# Patient Record
Sex: MALE | Race: WHITE | NOT HISPANIC OR LATINO
[De-identification: names, ages, dates, MRNs, and addresses within clinical notes are randomized per-mention and may not be internally consistent; named-entity substitution may affect disease eponyms.]

---

## 2021-09-30 PROBLEM — Z00.00 ENCOUNTER FOR PREVENTIVE HEALTH EXAMINATION: Status: ACTIVE | Noted: 2021-09-30

## 2021-10-06 ENCOUNTER — NON-APPOINTMENT (OUTPATIENT)
Age: 61
End: 2021-10-06

## 2021-10-11 ENCOUNTER — APPOINTMENT (OUTPATIENT)
Dept: HEART AND VASCULAR | Facility: CLINIC | Age: 61
End: 2021-10-11
Payer: COMMERCIAL

## 2021-10-11 ENCOUNTER — NON-APPOINTMENT (OUTPATIENT)
Age: 61
End: 2021-10-11

## 2021-10-11 VITALS
OXYGEN SATURATION: 98 % | BODY MASS INDEX: 24.01 KG/M2 | WEIGHT: 153 LBS | HEART RATE: 62 BPM | TEMPERATURE: 97.1 F | SYSTOLIC BLOOD PRESSURE: 120 MMHG | HEIGHT: 67 IN | DIASTOLIC BLOOD PRESSURE: 80 MMHG | RESPIRATION RATE: 16 BRPM

## 2021-10-11 DIAGNOSIS — Z87.09 PERSONAL HISTORY OF OTHER DISEASES OF THE RESPIRATORY SYSTEM: ICD-10-CM

## 2021-10-11 DIAGNOSIS — Z87.19 PERSONAL HISTORY OF OTHER DISEASES OF THE DIGESTIVE SYSTEM: ICD-10-CM

## 2021-10-11 DIAGNOSIS — Z80.3 FAMILY HISTORY OF MALIGNANT NEOPLASM OF BREAST: ICD-10-CM

## 2021-10-11 DIAGNOSIS — Z80.0 FAMILY HISTORY OF MALIGNANT NEOPLASM OF DIGESTIVE ORGANS: ICD-10-CM

## 2021-10-11 DIAGNOSIS — Z78.9 OTHER SPECIFIED HEALTH STATUS: ICD-10-CM

## 2021-10-11 DIAGNOSIS — Z87.438 PERSONAL HISTORY OF OTHER DISEASES OF MALE GENITAL ORGANS: ICD-10-CM

## 2021-10-11 DIAGNOSIS — Z86.79 PERSONAL HISTORY OF OTHER DISEASES OF THE CIRCULATORY SYSTEM: ICD-10-CM

## 2021-10-11 DIAGNOSIS — M54.12 RADICULOPATHY, CERVICAL REGION: ICD-10-CM

## 2021-10-11 DIAGNOSIS — Z87.11 PERSONAL HISTORY OF PEPTIC ULCER DISEASE: ICD-10-CM

## 2021-10-11 PROCEDURE — 93000 ELECTROCARDIOGRAM COMPLETE: CPT

## 2021-10-11 PROCEDURE — 99205 OFFICE O/P NEW HI 60 MIN: CPT

## 2021-10-11 RX ORDER — AMOXICILLIN AND CLAVULANATE POTASSIUM 875; 125 MG/1; MG/1
875-125 TABLET, COATED ORAL
Qty: 20 | Refills: 0 | Status: DISCONTINUED | COMMUNITY
Start: 2021-05-20 | End: 2021-10-11

## 2021-10-11 RX ORDER — CYCLOBENZAPRINE HYDROCHLORIDE 10 MG/1
10 TABLET, FILM COATED ORAL
Qty: 20 | Refills: 0 | Status: DISCONTINUED | COMMUNITY
Start: 2021-09-26 | End: 2021-10-11

## 2021-10-11 RX ORDER — ROSUVASTATIN CALCIUM 5 MG/1
5 TABLET, FILM COATED ORAL
Qty: 45 | Refills: 0 | Status: ACTIVE | COMMUNITY
Start: 2021-07-26

## 2021-10-11 RX ORDER — PANTOPRAZOLE 40 MG/1
40 TABLET, DELAYED RELEASE ORAL
Refills: 0 | Status: ACTIVE | COMMUNITY
Start: 2021-01-20

## 2021-10-12 ENCOUNTER — NON-APPOINTMENT (OUTPATIENT)
Age: 61
End: 2021-10-12

## 2021-10-12 PROBLEM — Z87.11 HISTORY OF PEPTIC ULCER: Status: RESOLVED | Noted: 2021-10-11 | Resolved: 2021-10-12

## 2021-10-12 PROBLEM — Z87.19 HISTORY OF HIATAL HERNIA: Status: RESOLVED | Noted: 2021-10-11 | Resolved: 2021-10-12

## 2021-10-12 PROBLEM — Z87.09 HISTORY OF CHRONIC SINUSITIS: Status: RESOLVED | Noted: 2021-10-11 | Resolved: 2021-10-12

## 2021-10-12 PROBLEM — Z87.09 HISTORY OF ASTHMA: Status: RESOLVED | Noted: 2021-10-11 | Resolved: 2021-10-12

## 2021-10-12 PROBLEM — M54.12 LEFT CERVICAL RADICULOPATHY: Status: RESOLVED | Noted: 2021-10-11 | Resolved: 2021-10-12

## 2021-10-12 PROBLEM — Z87.438 HISTORY OF BENIGN PROSTATIC HYPERPLASIA: Status: RESOLVED | Noted: 2021-10-11 | Resolved: 2021-10-12

## 2021-10-12 NOTE — PHYSICAL EXAM

## 2021-10-12 NOTE — DISCUSSION/SUMMARY
[Bundle Branch Block] : ~T bundle branch block [Hypertrophic Cardiomyopathy] : hypertrophic cardiomyopathy [Asymmetric Septal Hypertrophy] : asymmetric septal hypertrophy [Sodium Restriction] : sodium restriction [Hyperlipidemia] : hyperlipidemia [Diet Modification] : diet modification [Exercise] : exercise [Hypertension] : hypertension [Stable] : stable [None] : There are no changes in medication management [Exercise Regimen] : an exercise regimen [Dietary Modification] : dietary modification [Low Sodium Diet] : low sodium diet [Patient] : the patient

## 2021-10-12 NOTE — REASON FOR VISIT
[Arrhythmia/ECG Abnorrmalities] : arrhythmia/ECG abnormalities [Structural Heart and Valve Disease] : structural heart and valve disease [Hyperlipidemia] : hyperlipidemia [Hypertension] : hypertension [Spouse] : spouse [FreeTextEntry3] : Armando Adame

## 2021-10-12 NOTE — HISTORY OF PRESENT ILLNESS
[FreeTextEntry1] : Ayaz Kennedy is a 62 yo male who presents for CV evaluation.  He has been followed by Roxie Lambert and ZANA Torres.\par \par He denies cp, sob, pnd, orthopnea, edema, palp, or loc.\par \par Approximately 5 years ago, he suffered a pre-syncopal episode and was diagnosed with IHSS (as per pt).  He has undergone evaluation and follow up at [place].\par \par Since this time, he has made major lifestyle changes and lost 50 lbs.  \par \par He is very active.  He is compliant with meds.  (he did not tolerate beta blockers).\par \par ECG today reveals NSR, LBBB.\par \par Clinical hx reviewed in detail.\par \par Recommendations:\par 1. exercise counseling\par 2. collect records from [facility] and [place]\par 3. EXSE w/ strain\par 4. CPET\par 5. carotid duplex\par

## 2021-11-15 ENCOUNTER — APPOINTMENT (OUTPATIENT)
Dept: HEART AND VASCULAR | Facility: CLINIC | Age: 61
End: 2021-11-15
Payer: COMMERCIAL

## 2021-11-15 VITALS
OXYGEN SATURATION: 98 % | HEIGHT: 67 IN | BODY MASS INDEX: 24.01 KG/M2 | HEART RATE: 54 BPM | WEIGHT: 153 LBS | SYSTOLIC BLOOD PRESSURE: 154 MMHG | DIASTOLIC BLOOD PRESSURE: 72 MMHG

## 2021-11-15 PROCEDURE — 93880 EXTRACRANIAL BILAT STUDY: CPT

## 2021-11-15 PROCEDURE — 93351 STRESS TTE COMPLETE: CPT

## 2021-11-15 PROCEDURE — 93325 DOPPLER ECHO COLOR FLOW MAPG: CPT

## 2021-11-15 PROCEDURE — 93320 DOPPLER ECHO COMPLETE: CPT

## 2021-11-17 ENCOUNTER — NON-APPOINTMENT (OUTPATIENT)
Age: 61
End: 2021-11-17

## 2021-11-22 ENCOUNTER — APPOINTMENT (OUTPATIENT)
Dept: HEART AND VASCULAR | Facility: CLINIC | Age: 61
End: 2021-11-22
Payer: COMMERCIAL

## 2021-11-22 VITALS
DIASTOLIC BLOOD PRESSURE: 84 MMHG | OXYGEN SATURATION: 100 % | BODY MASS INDEX: 24.27 KG/M2 | TEMPERATURE: 98.1 F | WEIGHT: 151 LBS | HEIGHT: 66 IN | SYSTOLIC BLOOD PRESSURE: 152 MMHG

## 2021-11-22 PROCEDURE — 94010 BREATHING CAPACITY TEST: CPT | Mod: 59

## 2021-11-22 PROCEDURE — 94729 DIFFUSING CAPACITY: CPT

## 2021-11-22 PROCEDURE — 94727 GAS DIL/WSHOT DETER LNG VOL: CPT

## 2021-11-22 PROCEDURE — 94621 CARDIOPULM EXERCISE TESTING: CPT

## 2021-12-27 ENCOUNTER — APPOINTMENT (OUTPATIENT)
Dept: HEART AND VASCULAR | Facility: CLINIC | Age: 61
End: 2021-12-27
Payer: COMMERCIAL

## 2021-12-27 VITALS
BODY MASS INDEX: 24.21 KG/M2 | RESPIRATION RATE: 17 BRPM | HEART RATE: 74 BPM | SYSTOLIC BLOOD PRESSURE: 120 MMHG | DIASTOLIC BLOOD PRESSURE: 72 MMHG | OXYGEN SATURATION: 100 % | TEMPERATURE: 97.7 F | WEIGHT: 150 LBS

## 2021-12-27 PROCEDURE — 99215 OFFICE O/P EST HI 40 MIN: CPT

## 2021-12-27 NOTE — HISTORY OF PRESENT ILLNESS
[FreeTextEntry1] : Ayaz Kennedy returns for follow up.  He has been followed by Roxie Lambert and ZANA Torres.\par \par He denies cp, sob, pnd, orthopnea, edema, palp, or loc.\par \par Approximately 5 years ago, he suffered a pre-syncopal episode and was diagnosed with IHSS (as per pt).  He has undergone evaluation and follow up at [place].\par \par Since this time, he has made major lifestyle changes and lost 50 lbs.  \par \par He is very active.  He is compliant with meds.  (he did not tolerate beta blockers).\par \par Carotid Duplex 11/ 2021: min plaque b/l\par EXSE 11/2021: nl lv sys fxn; indeterminant mallory fxn; LVH with chordal ABDULLAHI; LVOT gradient 17 mmHg; global strain -18%; 8:50 min Ari; no ischemia\par CPET 11/2021: ischemic myocardial dysfxn; 104% predicted peak VO2; mild obst pulm disease (FEV1/VC reduced)\par \par Clinical hx and results reviewed in detail.\par \par Recommendations:\par 1. exercise counseling\par 2. continue current CV meds\par 3. obtain coronary CTA\par 4. should consider getting cardiac MRI\par \par

## 2021-12-27 NOTE — DISCUSSION/SUMMARY
[Bundle Branch Block] : ~T bundle branch block [Hypertrophic Cardiomyopathy] : hypertrophic cardiomyopathy [Hypertrophic Obstructive Cardiomyopathy] : hypertrophic obstructive cardiomyopathy [Possible Cardiac Ischemia (Intermd Prob)] : possible cardiac ischemia (intermediate probability) [Deteriorating] : deteriorating [Hyperlipidemia] : hyperlipidemia [Diet Modification] : diet modification [Exercise] : exercise [Hypertension] : hypertension [Exercise Regimen] : an exercise regimen [Dietary Modification] : dietary modification [Low Sodium Diet] : low sodium diet [Stable] : stable [Exercise Rehab] : exercise rehabilitation [Mild Mitral Regurgitation] : mild mitral regurgitation [Compensated] : compensated [None] : There are no changes in medication management [Sodium Restriction] : sodium restriction [Patient] : the patient [de-identified] : chordal ABDULLAHI; LVH 1.5 cm; indeterminant mallory fxn [de-identified] : chordal ABDULLAHI [de-identified] : carotid artery plaque

## 2021-12-27 NOTE — REASON FOR VISIT
[Arrhythmia/ECG Abnorrmalities] : arrhythmia/ECG abnormalities [Structural Heart and Valve Disease] : structural heart and valve disease [Hyperlipidemia] : hyperlipidemia [Hypertension] : hypertension [Coronary Artery Disease] : coronary artery disease [Carotid, Aortic and Peripheral Vascular Disease] : carotid, aortic and peripheral vascular disease [Spouse] : spouse [FreeTextEntry3] : Armando Adame

## 2021-12-27 NOTE — PHYSICAL EXAM

## 2021-12-28 LAB
ALBUMIN SERPL ELPH-MCNC: 4.6 G/DL
ALP BLD-CCNC: 69 U/L
ALT SERPL-CCNC: 27 U/L
ANION GAP SERPL CALC-SCNC: 15 MMOL/L
AST SERPL-CCNC: 29 U/L
BILIRUB SERPL-MCNC: 0.5 MG/DL
BUN SERPL-MCNC: 15 MG/DL
CALCIUM SERPL-MCNC: 10.2 MG/DL
CHLORIDE SERPL-SCNC: 102 MMOL/L
CO2 SERPL-SCNC: 26 MMOL/L
CREAT SERPL-MCNC: 1.02 MG/DL
GLUCOSE SERPL-MCNC: 104 MG/DL
POTASSIUM SERPL-SCNC: 4 MMOL/L
PROT SERPL-MCNC: 7.6 G/DL
SODIUM SERPL-SCNC: 143 MMOL/L

## 2021-12-30 ENCOUNTER — NON-APPOINTMENT (OUTPATIENT)
Age: 61
End: 2021-12-30

## 2022-01-07 ENCOUNTER — RESULT REVIEW (OUTPATIENT)
Age: 62
End: 2022-01-07

## 2022-01-11 ENCOUNTER — NON-APPOINTMENT (OUTPATIENT)
Age: 62
End: 2022-01-11

## 2022-01-24 ENCOUNTER — RESULT REVIEW (OUTPATIENT)
Age: 62
End: 2022-01-24

## 2022-01-26 ENCOUNTER — NON-APPOINTMENT (OUTPATIENT)
Age: 62
End: 2022-01-26

## 2022-01-27 ENCOUNTER — NON-APPOINTMENT (OUTPATIENT)
Age: 62
End: 2022-01-27

## 2022-02-10 ENCOUNTER — APPOINTMENT (OUTPATIENT)
Dept: HEART AND VASCULAR | Facility: CLINIC | Age: 62
End: 2022-02-10
Payer: COMMERCIAL

## 2022-02-10 VITALS
RESPIRATION RATE: 12 BRPM | WEIGHT: 150 LBS | TEMPERATURE: 97.3 F | BODY MASS INDEX: 24.11 KG/M2 | SYSTOLIC BLOOD PRESSURE: 118 MMHG | HEART RATE: 72 BPM | OXYGEN SATURATION: 97 % | DIASTOLIC BLOOD PRESSURE: 70 MMHG | HEIGHT: 66 IN

## 2022-02-10 DIAGNOSIS — U07.1 COVID-19: ICD-10-CM

## 2022-02-10 PROCEDURE — 99215 OFFICE O/P EST HI 40 MIN: CPT

## 2022-02-10 RX ORDER — METOPROLOL SUCCINATE 50 MG/1
50 TABLET, EXTENDED RELEASE ORAL
Qty: 2 | Refills: 0 | Status: DISCONTINUED | COMMUNITY
Start: 2021-12-28 | End: 2022-02-10

## 2022-02-10 RX ORDER — B-COMPLEX WITH VITAMIN C
TABLET ORAL
Refills: 0 | Status: ACTIVE | COMMUNITY
Start: 2022-02-10

## 2022-02-10 RX ORDER — VITAMIN E(DL-ALPHA TOCOPHEROL) 100 %
LIQUID (ML) MISCELLANEOUS
Refills: 0 | Status: ACTIVE | COMMUNITY
Start: 2022-02-10

## 2022-02-10 RX ORDER — TAURINE 100 %
POWDER (GRAM) MISCELLANEOUS
Refills: 0 | Status: ACTIVE | COMMUNITY
Start: 2022-02-10

## 2022-02-12 NOTE — DISCUSSION/SUMMARY
[Bundle Branch Block] : ~T bundle branch block [Hypertrophic Cardiomyopathy] : hypertrophic cardiomyopathy [Hypertrophic Obstructive Cardiomyopathy] : hypertrophic obstructive cardiomyopathy [Asymmetric Septal Hypertrophy] : asymmetric septal hypertrophy [Coronary Artery Disease] : coronary artery disease [Possible Cardiac Ischemia (Intermd Prob)] : possible cardiac ischemia (intermediate probability) [Hyperlipidemia] : hyperlipidemia [Diet Modification] : diet modification [Exercise] : exercise [Hypertension] : hypertension [Exercise Regimen] : an exercise regimen [Dietary Modification] : dietary modification [Low Sodium Diet] : low sodium diet [Moderate Mitral Regurgitation] : moderate mitral regurgitation [Compensated] : compensated [Sodium Restriction] : sodium restriction [Stable] : stable [None] : There are no changes in medication management [Exercise Rehab] : exercise rehabilitation [Patient] : the patient [de-identified] : mild LAD disease; LAD aneurysm post D1 [de-identified] : chordal ABDULLAHI [de-identified] : carotid artery plaque

## 2022-02-12 NOTE — HISTORY OF PRESENT ILLNESS
[FreeTextEntry1] : Ayaz Kennedy returns for follow up.  He has been followed by Roxie Lambert and ZANA Torres.\par \par He denies cp, sob, pnd, orthopnea, edema, palp, or loc.\par \par Approximately 5 years ago, he suffered a pre-syncopal episode and was diagnosed with IHSS (as per pt).  He has undergone evaluation and follow up at [place].\par \par Since this time, he has made major lifestyle changes and lost 50 lbs.  \par \par He is very active.  He is compliant with meds.  (he did not tolerate beta blockers).\par \par Carotid Duplex 11/ 2021: min plaque b/l\par EXSE 11/2021: nl lv sys fxn; indeterminant mallory fxn; LVH with chordal ABDULLAHI; LVOT gradient 17 mmHg; global strain -18%; 8:50 min Ari; no ischemia\par CPET 11/2021: ischemic myocardial dysfxn; 104% predicted peak VO2; mild obst pulm disease (FEV1/VC reduced)\par CTA 1/2022: mild LAD disease; small LAD aneurysm post D1; moderate hiatal hernia\par Cardiac MRI 1/2022: ADAM with delayed hyperenhancement of papillary muscle; ABDULLAHI with mod MR\par \par Clinical hx and results reviewed in detail.\par \par Recommendations:\par 1. exercise counseling\par 2. continue current CV meds\par 3. get CT FFR\par 4. SHD eval\par 5. f/u in 3 months\par \par

## 2022-02-12 NOTE — PHYSICAL EXAM

## 2022-02-25 ENCOUNTER — APPOINTMENT (OUTPATIENT)
Dept: HEART AND VASCULAR | Facility: CLINIC | Age: 62
End: 2022-02-25

## 2022-02-25 NOTE — REASON FOR VISIT
[Cardiac Failure] : cardiac failure [Structural Heart and Valve Disease] : structural heart and valve disease [FreeTextEntry1] : \par \par \par

## 2022-02-25 NOTE — HISTORY OF PRESENT ILLNESS
[FreeTextEntry1] : 62 y/o M with pmhx of IHSS who was initially diagnosed after a pre-syncopal event. He is very active, but recently was found to have pAfib. He was started on NOAC and Ayaz Kennedy returns for follow up.  He has been followed by Roxie Lambert and ZANA Torres.\par \par He denies cp, sob, pnd, orthopnea, edema, palp, or loc.\par \par Approximately 5 years ago, he suffered a pre-syncopal episode and was diagnosed with IHSS (as per pt).  He has undergone evaluation and follow up at [place].\par \par Since this time, he has made major lifestyle changes and lost 50 lbs.  \par \par He is very active.  He is compliant with meds.  (he did not tolerate beta blockers).\par \par Carotid Duplex 11/ 2021: min plaque b/l\par EXSE 11/2021: nl lv sys fxn; indeterminant mallory fxn; LVH with chordal ABDULLAHI; LVOT gradient 17 mmHg; global strain -18%; 8:50 min Ari; no ischemia\par CPET 11/2021: ischemic myocardial dysfxn; 104% predicted peak VO2; mild obst pulm disease (FEV1/VC reduced)\par CTA 1/2022: mild LAD disease; small LAD aneurysm post D1; moderate hiatal hernia\par Cardiac MRI 1/2022: ADAM with delayed hyperenhancement of papillary muscle; ABDULLAHI with mod MR, LVEF 56%, RVEF 63%, basal michael septum 15.7mm, basal inferolateral segment 12.7mm, LVOT flow acceleration.\par EKG- NSR, LBBB()\par \par

## 2022-02-25 NOTE — PHYSICAL EXAM

## 2022-03-21 ENCOUNTER — APPOINTMENT (OUTPATIENT)
Dept: HEART AND VASCULAR | Facility: CLINIC | Age: 62
End: 2022-03-21
Payer: COMMERCIAL

## 2022-03-21 ENCOUNTER — NON-APPOINTMENT (OUTPATIENT)
Age: 62
End: 2022-03-21

## 2022-03-21 VITALS
HEART RATE: 57 BPM | SYSTOLIC BLOOD PRESSURE: 137 MMHG | WEIGHT: 150 LBS | DIASTOLIC BLOOD PRESSURE: 82 MMHG | HEIGHT: 66 IN | BODY MASS INDEX: 24.11 KG/M2 | OXYGEN SATURATION: 96 % | TEMPERATURE: 97.8 F

## 2022-03-21 PROCEDURE — 99204 OFFICE O/P NEW MOD 45 MIN: CPT

## 2022-03-21 PROCEDURE — 93000 ELECTROCARDIOGRAM COMPLETE: CPT

## 2022-03-21 PROCEDURE — 99214 OFFICE O/P EST MOD 30 MIN: CPT

## 2022-03-21 RX ORDER — DIAZEPAM 10 MG/1
10 TABLET ORAL
Qty: 1 | Refills: 0 | Status: DISCONTINUED | COMMUNITY
Start: 2021-12-09 | End: 2022-03-21

## 2022-03-21 NOTE — ASSESSMENT
[FreeTextEntry1] : 60 y/o M with chronic diastolic heart failure, mitral regurgitation (ABDULLAHI, with mild-mod MR), hypertrophic cardiomyopathy, paroxysmal afib (on Eliquis) who presents to establish care for HCM.\par -He has no risk factors for SCD. (No family hx of SCD, no prior unexplained syncope, no hx of VT, cMRI did not reveal LGE of =15% of the LV mass, no LHV >30mm, no systolic dysfunction, no NSVT on ambulatory monitor, no apical aneurysm). He will continue following up with EPS to re-assess high risk features\par -He has not had genetic testing, but pt has no offspring so it would unlikely .\par -He has no LVOT outflow gradient on resting or stress echo (rest or provocation). There is no indication for septal reduction therapy at this point.\par -He is on losartan for BP, because his HR is slow and he can't tolerate BB therapy.\par -new afib, but now in NSR. His risk for stroke is higher b/c of HCM, and he is on appropriate dose NOAC. He has good f/u with EPS. He is not on rhythm control medications.\par -His MR is moderate (on MRI), posteriorly directed jet on echo (consistent with mild ABDULLAHI)\par -He exhibits no autonomic dysfunction (BP augmented appropriately with exercise stress test)\par -Repeat TTE in 1 year for continual assessment.\par -F/u with Dr. Andrew Canseco, Dr. Rodríguez

## 2022-03-21 NOTE — REVIEW OF SYSTEMS
[Negative] : Heme/Lymph [Fever] : no fever [Chills] : no chills [Blurry Vision] : no blurred vision [Earache] : no earache [Dyspnea on exertion] : not dyspnea during exertion [Chest Discomfort] : no chest discomfort [Palpitations] : no palpitations [Syncope] : no syncope [Cough] : no cough [Abdominal Pain] : no abdominal pain [Change in Appetite] : no change in appetite [Constipation] : no constipation [Urinary Frequency] : no change in urinary frequency [Erectile Dysfunction] : no erectile dysfunction [Joint Pain] : no joint pain [Myalgia] : no myalgia [Rash] : no rash [Confusion] : no confusion was observed [Under Stress] : not under stress [Easy Bleeding] : no tendency for easy bleeding

## 2022-03-21 NOTE — HISTORY OF PRESENT ILLNESS
[FreeTextEntry1] : 60 y/o M with pmhx of hypertrophic cardiomyopathy, recent diagnosis of pAfib (on NOAC) who presents for consultation for SHD evaluation. \par \par He denies cp, sob, pnd, orthopnea, edema, palp, or loc.\par \par Approximately 5 years ago, he suffered a pre-syncopal episode after his anti-hypertension drugs were increased. About a year later he was diagnosed with IHSS (as per pt).  He has undergone evaluation and follow up at [place] with Dr. Lambert. He then switched care to Dr. Andrew Canseco, who ordered a cMRI and other studies.\par \par Since his initial diagnosis, he has made major lifestyle changes and lost 50 lbs.  \par \par He has NYHA 1 symptoms, no angina, no syncope\par \par He has a brother diagnosed with IHSS, but no SCD in the family. He has not had genotyping. He has no offspring though.\par \par Carotid Duplex 11/ 2021: min plaque b/l\par EXSE 11/2021: nl lv sys fxn; indeterminant mallory fxn; LVH with chordal ABDULLAHI; LVOT gradient 17 mmHg; global strain -18%; 8:50 min Ari; no ischemia\par CPET 11/2021: ischemic myocardial dysfxn; 104% predicted peak VO2; mild obst pulm disease (FEV1/VC reduced)\par CTA 1/2022: mild LAD disease; small LAD aneurysm post D1; moderate hiatal hernia\par Cardiac MRI 1/2022: ADAM with delayed hyperenhancement of papillary muscle; ABDULLAHI with mod MR, LVEF 56%, RVEF 63%, basal michael septum 15.7mm, basal inferolateral segment 12.7mm, LVOT flow acceleration.\par EKG- NSR, LBBB()\par Echo reviewed- normal LVEF, +LVH, +ABDULLAHI with mild MR\par

## 2022-03-21 NOTE — PHYSICAL EXAM

## 2022-05-12 ENCOUNTER — TRANSCRIPTION ENCOUNTER (OUTPATIENT)
Age: 62
End: 2022-05-12

## 2022-05-12 ENCOUNTER — APPOINTMENT (OUTPATIENT)
Dept: HEART AND VASCULAR | Facility: CLINIC | Age: 62
End: 2022-05-12
Payer: COMMERCIAL

## 2022-05-12 VITALS
HEART RATE: 71 BPM | TEMPERATURE: 97.1 F | WEIGHT: 150 LBS | SYSTOLIC BLOOD PRESSURE: 120 MMHG | RESPIRATION RATE: 13 BRPM | BODY MASS INDEX: 24.11 KG/M2 | DIASTOLIC BLOOD PRESSURE: 84 MMHG | OXYGEN SATURATION: 99 % | HEIGHT: 66 IN

## 2022-05-12 DIAGNOSIS — I34.0 NONRHEUMATIC MITRAL (VALVE) INSUFFICIENCY: ICD-10-CM

## 2022-05-12 PROCEDURE — 99215 OFFICE O/P EST HI 40 MIN: CPT

## 2022-05-12 NOTE — DISCUSSION/SUMMARY
[Bundle Branch Block] : ~T bundle branch block [Hypertrophic Cardiomyopathy] : hypertrophic cardiomyopathy [Hypertrophic Obstructive Cardiomyopathy] : hypertrophic obstructive cardiomyopathy [Asymmetric Septal Hypertrophy] : asymmetric septal hypertrophy [Coronary Artery Disease] : coronary artery disease [Possible Cardiac Ischemia (Intermd Prob)] : possible cardiac ischemia (intermediate probability) [Hyperlipidemia] : hyperlipidemia [Diet Modification] : diet modification [Exercise] : exercise [Hypertension] : hypertension [Exercise Regimen] : an exercise regimen [Dietary Modification] : dietary modification [Low Sodium Diet] : low sodium diet [Moderate Mitral Regurgitation] : moderate mitral regurgitation [Compensated] : compensated [Sodium Restriction] : sodium restriction [Stable] : stable [None] : There are no changes in medication management [Exercise Rehab] : exercise rehabilitation [Patient] : the patient [de-identified] : mild LAD disease; LAD aneurysm post D1 [de-identified] : chordal ABDULLAHI [de-identified] : carotid artery plaque

## 2022-05-12 NOTE — PHYSICAL EXAM

## 2022-05-12 NOTE — HISTORY OF PRESENT ILLNESS
[FreeTextEntry1] : Ayaz Kennedy returns for follow up.  He has been followed by Roxie Lambert and Z[facility] Torres.\par \par He denies cp, sob, pnd, orthopnea, edema, palp, or loc.\par \par He is very active.  He is compliant with meds.  (he did not tolerate beta blockers).\par \par Carotid Duplex 11/ 2021: min plaque b/l\par EXSE 11/2021: nl lv sys fxn; indeterminant mallory fxn; LVH with chordal ABDULLAHI; LVOT gradient 17 mmHg; global strain -18%; 8:50 min Ari; no ischemia\par CPET 11/2021: ischemic myocardial dysfxn; 104% predicted peak VO2; mild obst pulm disease (FEV1/VC reduced)\par CTA 1/2022: mild LAD disease; small LAD aneurysm post D1; moderate hiatal hernia\par Cardiac MRI 1/2022: ADAM with delayed hyperenhancement of papillary muscle; ABDULLAHI with mod MR\par \par Clinical hx reviewed in detail.\par \par Recommendations:\par 1. exercise counseling\par 2. continue current CV meds\par 3. get CT FFR\par 4. SHD noted\par 5. f/u in 6 months\par 6. records from primary care\par

## 2022-05-12 NOTE — REASON FOR VISIT
[Arrhythmia/ECG Abnorrmalities] : arrhythmia/ECG abnormalities [Structural Heart and Valve Disease] : structural heart and valve disease [Hyperlipidemia] : hyperlipidemia [Hypertension] : hypertension [Coronary Artery Disease] : coronary artery disease [Carotid, Aortic and Peripheral Vascular Disease] : carotid, aortic and peripheral vascular disease [FreeTextEntry3] : Armando Adame

## 2022-11-10 ENCOUNTER — APPOINTMENT (OUTPATIENT)
Dept: HEART AND VASCULAR | Facility: CLINIC | Age: 62
End: 2022-11-10

## 2023-05-08 ENCOUNTER — NON-APPOINTMENT (OUTPATIENT)
Age: 63
End: 2023-05-08

## 2023-05-09 ENCOUNTER — NON-APPOINTMENT (OUTPATIENT)
Age: 63
End: 2023-05-09

## 2023-05-09 ENCOUNTER — APPOINTMENT (OUTPATIENT)
Dept: HEART AND VASCULAR | Facility: CLINIC | Age: 63
End: 2023-05-09
Payer: COMMERCIAL

## 2023-05-09 VITALS
RESPIRATION RATE: 16 BRPM | TEMPERATURE: 97.6 F | HEART RATE: 58 BPM | BODY MASS INDEX: 25.55 KG/M2 | DIASTOLIC BLOOD PRESSURE: 72 MMHG | HEIGHT: 66 IN | WEIGHT: 159 LBS | SYSTOLIC BLOOD PRESSURE: 120 MMHG | OXYGEN SATURATION: 96 %

## 2023-05-09 DIAGNOSIS — I65.29 OCCLUSION AND STENOSIS OF UNSPECIFIED CAROTID ARTERY: ICD-10-CM

## 2023-05-09 DIAGNOSIS — I25.10 ATHEROSCLEROTIC HEART DISEASE OF NATIVE CORONARY ARTERY W/OUT ANGINA PECTORIS: ICD-10-CM

## 2023-05-09 DIAGNOSIS — I44.7 LEFT BUNDLE-BRANCH BLOCK, UNSPECIFIED: ICD-10-CM

## 2023-05-09 DIAGNOSIS — I42.2 OTHER HYPERTROPHIC CARDIOMYOPATHY: ICD-10-CM

## 2023-05-09 DIAGNOSIS — I34.0 NONRHEUMATIC MITRAL (VALVE) INSUFFICIENCY: ICD-10-CM

## 2023-05-09 DIAGNOSIS — I10 ESSENTIAL (PRIMARY) HYPERTENSION: ICD-10-CM

## 2023-05-09 DIAGNOSIS — I25.41 CORONARY ARTERY ANEURYSM: ICD-10-CM

## 2023-05-09 DIAGNOSIS — E78.5 HYPERLIPIDEMIA, UNSPECIFIED: ICD-10-CM

## 2023-05-09 DIAGNOSIS — I34.89 OTHER NONRHEUMATIC MITRAL VALVE DISORDERS: ICD-10-CM

## 2023-05-09 DIAGNOSIS — I25.5 ISCHEMIC CARDIOMYOPATHY: ICD-10-CM

## 2023-05-09 PROCEDURE — 99215 OFFICE O/P EST HI 40 MIN: CPT | Mod: 25

## 2023-05-09 PROCEDURE — 93000 ELECTROCARDIOGRAM COMPLETE: CPT

## 2023-05-09 RX ORDER — FLUTICASONE PROPIONATE 50 UG/1
50 SPRAY, METERED NASAL
Qty: 48 | Refills: 0 | Status: ACTIVE | COMMUNITY
Start: 2023-04-24

## 2023-05-09 RX ORDER — CLONAZEPAM 0.5 MG/1
0.5 TABLET ORAL AT BEDTIME
Refills: 0 | Status: ACTIVE | COMMUNITY
Start: 2021-09-14

## 2023-05-09 RX ORDER — UBIDECARENONE 200 MG
200 CAPSULE ORAL EVERY OTHER DAY
Refills: 0 | Status: ACTIVE | COMMUNITY
Start: 2022-02-10

## 2023-05-09 RX ORDER — MONTELUKAST SODIUM 4 MG/1
4 GRANULE ORAL
Refills: 0 | Status: ACTIVE | COMMUNITY

## 2023-05-09 RX ORDER — LOSARTAN POTASSIUM 50 MG/1
50 TABLET, FILM COATED ORAL DAILY
Refills: 0 | Status: ACTIVE | COMMUNITY
Start: 2021-09-16

## 2023-05-09 RX ORDER — VIT C/E/ZN/COPPR/LUTEIN/ZEAXAN 250MG-90MG
CAPSULE ORAL DAILY
Refills: 0 | Status: ACTIVE | COMMUNITY
Start: 2022-02-10

## 2023-05-09 RX ORDER — CELECOXIB 50 MG/1
CAPSULE ORAL
Refills: 0 | Status: ACTIVE | COMMUNITY

## 2023-05-09 RX ORDER — OMEPRAZOLE 20 MG/1
20 CAPSULE, DELAYED RELEASE ORAL
Refills: 0 | Status: DISCONTINUED | COMMUNITY
Start: 2021-09-12 | End: 2023-05-09

## 2023-05-09 RX ORDER — TIZANIDINE 4 MG/1
4 TABLET ORAL
Refills: 0 | Status: DISCONTINUED | COMMUNITY
Start: 2021-09-15 | End: 2023-05-09

## 2023-05-09 NOTE — REVIEW OF SYSTEMS
[Earache] : no earache [Discharge From Ears] : no discharge from the ears [Hearing Loss] : no hearing loss [Mouth Sores] : no mouth sores [Sore Throat] : no sore throat [Sinus Pressure] : no sinus pressure [Tinnitus] : tinnitus [Vertigo] : vertigo [Dizziness] : dizziness [Tremor] : a tremor was seen [Numbness (Hypoesthesia)] : no numbness [Convulsions] : no convulsions [Tingling (Paresthesia)] : no tingling [Weakness] : no weakness [Limb Weakness (Paresis)] : no limb weakness (Paresis) [Speech Disturbance] : no speech disturbance [Negative] : Heme/Lymph

## 2023-05-09 NOTE — PHYSICAL EXAM

## 2023-05-09 NOTE — HISTORY OF PRESENT ILLNESS
[FreeTextEntry1] : Ayaz Kennedy returns for follow up.  \par \par He has been followed by Roxie Lambert and ZANA Torres.\par \par He denies cp, sob, pnd, orthopnea, edema, palp, or loc.\par \par He is very active.  He is compliant with meds.  (he did not tolerate beta blockers).\par \par ECG today reveals sinus bradycardia, PVC, LBBB\par \par Carotid Duplex 11/ 2021: min plaque b/l\par EXSE 11/2021: nl lv sys fxn; indeterminant mallory fxn; LVH with chordal ABDULLAHI; LVOT gradient 17 mmHg; global strain -18%; 8:50 min Ari; no ischemia\par CPET 11/2021: ischemic myocardial dysfxn; 104% predicted peak VO2; mild obst pulm disease (FEV1/VC reduced)\par CTA 1/2022: mild LAD disease; small LAD aneurysm post D1; moderate hiatal hernia\par Cardiac MRI 1/2022: ADAM with delayed hyperenhancement of papillary muscle; ABDULLAHI with mod MR\par \par Clinical hx reviewed in detail.\par \par Recommendations:\par 1. exercise counseling\par 2. continue current CV meds\par 3. collect records from [facility] ([facility]), [facility] (Neurosurgery), [facility] ([facility]), [facility]\par 4. blood work\par 5. Neuro referral\par 6. review MRI/MRA/CT scan\par 7. EXSE/CPET

## 2023-05-09 NOTE — DISCUSSION/SUMMARY
[A-V Block] : A-V block [Bundle Branch Block] : ~T bundle branch block [PVCs] : ectopic ventricular beats [Hypertrophic Cardiomyopathy] : hypertrophic cardiomyopathy [Hypertrophic Obstructive Cardiomyopathy] : hypertrophic obstructive cardiomyopathy [Asymmetric Septal Hypertrophy] : asymmetric septal hypertrophy [Coronary Artery Disease] : coronary artery disease [Possible Cardiac Ischemia (Intermd Prob)] : possible cardiac ischemia (intermediate probability) [Hyperlipidemia] : hyperlipidemia [Diet Modification] : diet modification [Exercise] : exercise [Hypertension] : hypertension [Exercise Regimen] : an exercise regimen [Dietary Modification] : dietary modification [Low Sodium Diet] : low sodium diet [Moderate Mitral Regurgitation] : moderate mitral regurgitation [Compensated] : compensated [Sodium Restriction] : sodium restriction [Stable] : stable [None] : There are no changes in medication management [Exercise Rehab] : exercise rehabilitation [Patient] : the patient [de-identified] : mild LAD disease; LAD aneurysm post D1 [de-identified] : chordal ABDULLAHI [de-identified] : carotid artery plaque [EKG obtained to assist in diagnosis and management of assessed problem(s)] : EKG obtained to assist in diagnosis and management of assessed problem(s)

## 2023-05-11 LAB
CRP SERPL HS-MCNC: 0.55 MG/L
ERYTHROCYTE [SEDIMENTATION RATE] IN BLOOD BY WESTERGREN METHOD: 2 MM/HR
FERRITIN SERPL-MCNC: 57 NG/ML

## 2023-05-18 ENCOUNTER — NON-APPOINTMENT (OUTPATIENT)
Age: 63
End: 2023-05-18

## 2023-06-16 ENCOUNTER — APPOINTMENT (OUTPATIENT)
Dept: NEUROLOGY | Facility: CLINIC | Age: 63
End: 2023-06-16
Payer: COMMERCIAL

## 2023-06-16 VITALS
HEART RATE: 74 BPM | BODY MASS INDEX: 25.55 KG/M2 | HEIGHT: 66 IN | WEIGHT: 159 LBS | OXYGEN SATURATION: 98 % | DIASTOLIC BLOOD PRESSURE: 76 MMHG | SYSTOLIC BLOOD PRESSURE: 110 MMHG

## 2023-06-16 DIAGNOSIS — M47.812 SPONDYLOSIS W/OUT MYELOPATHY OR RADICULOPATHY, CERVICAL REGION: ICD-10-CM

## 2023-06-16 DIAGNOSIS — R29.3 ABNORMAL POSTURE: ICD-10-CM

## 2023-06-16 PROCEDURE — 99204 OFFICE O/P NEW MOD 45 MIN: CPT | Mod: 1L

## 2023-06-20 NOTE — DISCUSSION/SUMMARY
[FreeTextEntry1] : Ayaz is a pleasant 63-year-old RH man with a history of htn, hld, cervical disc disease s/p C4-C6 ACDF , with normal ENT evaluation for vestibular etiology to symptoms of imbalance. Has persistent tinnitus. \par States that due to imbalance, he can no longer do activities such as ride his motorcycle. 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 MRI cervical spine with mild cord indentation at C3-C4 , signal is normal. C6-C7 junctional foraminal stenosis is present. Seen by ENT who thought tinnitus was secondary to high frequency hearing loss. Unclear etiology to symptoms. Discussed with him that it is possible that dizziness is coming from cervical spine disease - discussed that it is not clear that bone spur removal would improve his symptoms and discussed that I would not recommend cervical spine surgery for that reason alone. Will refer to Dr. Lundberg for review of imaging and second opinion. WIll refer to vestibular therapy but unclear it will help.

## 2023-06-20 NOTE — PHYSICAL EXAM
[FreeTextEntry1] : Blood pressure: lying down /76, heart rate 57, sitting up /80 heart rate 66, /79 heart rate 69 \par Mental Status: knows the month, day and year. Knows name of hospital. Able to calculate number of quarters in $2.25. Difficulty with serial sevens. Able to spell "world" backwards. 3/3 registration of three items, 3/3 recall at three minutes. \par Language/Speech : speech fluent, can name, can repeat \par Cranial Nerves \par II: Pupils equal and reactive,  VFF full\par III, IV, VI: EOMI\par V : normal sensation in V1-V3 b/l\par VII : no asymmetry, no nasolabial fold flattening\par VIII: normal hearing to voice \par IX, X: normal palatal elevation, uvula midline\par XI: 5/5 head turn and 5/5 shoulder shrug bilaterally\par XII : midline tongue protrusion\par Motor: no drift in limbs, normal bulk and tone, 5/5 in all extremities\par Sensory: normal to light touch, pinprick and vibration\par Reflexes: brachioradialis, biceps, triceps 1+, patella 2+ and ankles 1+ bilaterally\par Toes are down-going \par Coordination: Normal finger to nose\par Gait: normal balance and gait, able to toe/heel/tandem  \par \par

## 2023-06-20 NOTE — HISTORY OF PRESENT ILLNESS
[FreeTextEntry1] : Ayaz is a 63-year-old left-handed man with a past medical history of hypertension and hyperlipidemia. He is presenting to clinic for evaluation of episodes of imbalance and ringing in the ear.  He has had an extensive work-up including an MRI brain w/wo contrast and MRI cervical spine with no definite etiology. \par He saw an ENT doctor and his sinuses are clear, no issue detected with inner ear. He had a video nystagmogram, vestibular tests, which did not reveal a peripheral etiology for symptoms. He does have a history of an anterior cervical discectomy with Dr. Díaz. He was told he has a bone spur and was told that surgery for this may improve his ringing in the ear and slight loss of balance. He does not want to do this surgery unless it is sure this will be helped. \par \par He did suffer from COVID in 2022. He had no respiratory symptoms. However after the infection he developed ringing in the ear and headaches. \par He saw Dr. Lazo from neuro-ophthalmology who also didn't have a clear answer for the etiology of his symptoms. \par \par The tinnitus comes and goes. He also has a sensation of pulling or fullness in the ear. No neck pain. He has difficulties with balance. He can no longer ride his motorcycle. He has a little fatigue. He is not falling. Not a sensation of spinning, just dysequilibrium. \par \par He is very active, lifts weights, enjoys sports competitions and these sensations have limited him. \par \par He was seen in 7/2022 by Dr. Sergio Santana - ENT who noted bilateral high frequency hearing loss and thought tinnitus was secondary to this. He doubted vestibular etiology to symptoms and stated that dizziness was of unclear etiology. \par \par Allergies: \par seasonal allergies \par \par Surgeries\par Dr. 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 Fetal origin of the left posterior cerebral artery and hypoplasia of the right vertebral artery which terminates in the PICA. these are developmental variations - otherwise normal MRA brain. \par 8/2022: w/wo contrast - outside study: Ventricles, cisterns, sulci are age appropriate. The posterior fossa structures including brainstem and cerebellum are unremarkable. \par The cerebellopontine angle cisterns are within normal limits. the internal auditory canals are symmetric. the otic capsule structures are symmetrically developed, unremarkable. No mass or abnormal enhancement is identified within the middle ear cavities, external auditory canals are grossly unremarkable. \par MRI cervical spine: C4-C6 instrumentation ACDF stable imaging appearance. Mild progressive C3-C4 junctional central and foraminal stenosis. C6-C7 foraminal stenosis . \par \par

## 2023-06-20 NOTE — ASSESSMENT
[FreeTextEntry1] : Sense of imbalance could be coming from cervical spine - don’t recommend surgery just for improvement in balance - tinnitus \par \par Second opinion from Dr. Lundberg\par \par Recommend vestibular therapy\par \par RTC as needed. \par

## 2023-06-29 ENCOUNTER — APPOINTMENT (OUTPATIENT)
Dept: HEART AND VASCULAR | Facility: CLINIC | Age: 63
End: 2023-06-29
Payer: COMMERCIAL

## 2023-06-29 VITALS
DIASTOLIC BLOOD PRESSURE: 70 MMHG | OXYGEN SATURATION: 97 % | WEIGHT: 159 LBS | BODY MASS INDEX: 25.55 KG/M2 | SYSTOLIC BLOOD PRESSURE: 106 MMHG | HEIGHT: 66 IN | HEART RATE: 70 BPM

## 2023-06-29 PROCEDURE — 93325 DOPPLER ECHO COLOR FLOW MAPG: CPT

## 2023-06-29 PROCEDURE — 93351 STRESS TTE COMPLETE: CPT

## 2023-06-29 PROCEDURE — 93320 DOPPLER ECHO COMPLETE: CPT

## 2023-07-13 ENCOUNTER — NON-APPOINTMENT (OUTPATIENT)
Age: 63
End: 2023-07-13

## 2023-08-30 ENCOUNTER — APPOINTMENT (OUTPATIENT)
Dept: HEART AND VASCULAR | Facility: CLINIC | Age: 63
End: 2023-08-30
Payer: COMMERCIAL

## 2023-08-30 VITALS
SYSTOLIC BLOOD PRESSURE: 104 MMHG | BODY MASS INDEX: 24.59 KG/M2 | DIASTOLIC BLOOD PRESSURE: 60 MMHG | HEIGHT: 66 IN | HEART RATE: 66 BPM | WEIGHT: 153 LBS

## 2023-08-30 PROCEDURE — 94729 DIFFUSING CAPACITY: CPT

## 2023-08-30 PROCEDURE — 94621 CARDIOPULM EXERCISE TESTING: CPT

## 2023-08-30 PROCEDURE — 94727 GAS DIL/WSHOT DETER LNG VOL: CPT

## 2023-08-30 PROCEDURE — 94010 BREATHING CAPACITY TEST: CPT | Mod: 59

## 2025-01-14 ENCOUNTER — NON-APPOINTMENT (OUTPATIENT)
Age: 65
End: 2025-01-14

## 2025-01-21 ENCOUNTER — APPOINTMENT (OUTPATIENT)
Dept: HEART AND VASCULAR | Facility: CLINIC | Age: 65
End: 2025-01-21
Payer: COMMERCIAL

## 2025-01-21 VITALS
WEIGHT: 153 LBS | RESPIRATION RATE: 16 BRPM | TEMPERATURE: 97.6 F | DIASTOLIC BLOOD PRESSURE: 58 MMHG | HEART RATE: 33 BPM | SYSTOLIC BLOOD PRESSURE: 110 MMHG | OXYGEN SATURATION: 98 % | HEIGHT: 66 IN | BODY MASS INDEX: 24.59 KG/M2

## 2025-01-21 VITALS
DIASTOLIC BLOOD PRESSURE: 60 MMHG | OXYGEN SATURATION: 97 % | WEIGHT: 153 LBS | HEIGHT: 66 IN | SYSTOLIC BLOOD PRESSURE: 112 MMHG | HEART RATE: 34 BPM | BODY MASS INDEX: 24.59 KG/M2

## 2025-01-21 DIAGNOSIS — R06.02 SHORTNESS OF BREATH: ICD-10-CM

## 2025-01-21 DIAGNOSIS — I10 ESSENTIAL (PRIMARY) HYPERTENSION: ICD-10-CM

## 2025-01-21 DIAGNOSIS — E78.5 HYPERLIPIDEMIA, UNSPECIFIED: ICD-10-CM

## 2025-01-21 DIAGNOSIS — I25.10 ATHEROSCLEROTIC HEART DISEASE OF NATIVE CORONARY ARTERY W/OUT ANGINA PECTORIS: ICD-10-CM

## 2025-01-21 DIAGNOSIS — I34.89 OTHER NONRHEUMATIC MITRAL VALVE DISORDERS: ICD-10-CM

## 2025-01-21 DIAGNOSIS — I45.9 CONDUCTION DISORDER, UNSPECIFIED: ICD-10-CM

## 2025-01-21 PROCEDURE — 93306 TTE W/DOPPLER COMPLETE: CPT

## 2025-01-21 PROCEDURE — 99214 OFFICE O/P EST MOD 30 MIN: CPT

## 2025-01-21 PROCEDURE — 93000 ELECTROCARDIOGRAM COMPLETE: CPT

## 2025-01-22 ENCOUNTER — RESULT REVIEW (OUTPATIENT)
Age: 65
End: 2025-01-22

## 2025-01-23 ENCOUNTER — TRANSCRIPTION ENCOUNTER (OUTPATIENT)
Age: 65
End: 2025-01-23

## 2025-02-10 PROBLEM — Z00.00 ENCOUNTER FOR PREVENTIVE HEALTH EXAMINATION: Status: ACTIVE | Noted: 2025-02-10

## 2025-02-25 ENCOUNTER — APPOINTMENT (OUTPATIENT)
Dept: HEART AND VASCULAR | Facility: CLINIC | Age: 65
End: 2025-02-25
Payer: COMMERCIAL

## 2025-02-25 ENCOUNTER — NON-APPOINTMENT (OUTPATIENT)
Age: 65
End: 2025-02-25

## 2025-02-25 VITALS
HEIGHT: 66 IN | SYSTOLIC BLOOD PRESSURE: 152 MMHG | WEIGHT: 150 LBS | BODY MASS INDEX: 24.11 KG/M2 | DIASTOLIC BLOOD PRESSURE: 78 MMHG | OXYGEN SATURATION: 97 % | HEART RATE: 74 BPM

## 2025-02-25 DIAGNOSIS — Z95.0 PRESENCE OF CARDIAC PACEMAKER: ICD-10-CM

## 2025-02-25 DIAGNOSIS — I44.2 ATRIOVENTRICULAR BLOCK, COMPLETE: ICD-10-CM

## 2025-02-25 PROCEDURE — 99205 OFFICE O/P NEW HI 60 MIN: CPT

## 2025-02-25 PROCEDURE — 93280 PM DEVICE PROGR EVAL DUAL: CPT

## 2025-02-25 PROCEDURE — 99215 OFFICE O/P EST HI 40 MIN: CPT

## 2025-02-28 ENCOUNTER — NON-APPOINTMENT (OUTPATIENT)
Age: 65
End: 2025-02-28

## 2025-03-07 ENCOUNTER — APPOINTMENT (OUTPATIENT)
Dept: HEART FAILURE | Facility: CLINIC | Age: 65
End: 2025-03-07

## 2025-03-07 ENCOUNTER — NON-APPOINTMENT (OUTPATIENT)
Age: 65
End: 2025-03-07

## 2025-03-07 ENCOUNTER — APPOINTMENT (OUTPATIENT)
Dept: HEART FAILURE | Facility: CLINIC | Age: 65
End: 2025-03-07
Payer: COMMERCIAL

## 2025-03-07 VITALS
BODY MASS INDEX: 24.11 KG/M2 | WEIGHT: 150 LBS | OXYGEN SATURATION: 97 % | HEART RATE: 50 BPM | HEIGHT: 66 IN | DIASTOLIC BLOOD PRESSURE: 80 MMHG | SYSTOLIC BLOOD PRESSURE: 118 MMHG | TEMPERATURE: 98.7 F

## 2025-03-07 DIAGNOSIS — I34.0 NONRHEUMATIC MITRAL (VALVE) INSUFFICIENCY: ICD-10-CM

## 2025-03-07 DIAGNOSIS — I34.89 OTHER NONRHEUMATIC MITRAL VALVE DISORDERS: ICD-10-CM

## 2025-03-07 DIAGNOSIS — I10 ESSENTIAL (PRIMARY) HYPERTENSION: ICD-10-CM

## 2025-03-07 DIAGNOSIS — E78.5 HYPERLIPIDEMIA, UNSPECIFIED: ICD-10-CM

## 2025-03-07 DIAGNOSIS — I42.2 OTHER HYPERTROPHIC CARDIOMYOPATHY: ICD-10-CM

## 2025-03-07 DIAGNOSIS — I44.1 ATRIOVENTRICULAR BLOCK, SECOND DEGREE: ICD-10-CM

## 2025-03-07 PROCEDURE — 99204 OFFICE O/P NEW MOD 45 MIN: CPT | Mod: 25

## 2025-03-07 PROCEDURE — 93000 ELECTROCARDIOGRAM COMPLETE: CPT

## 2025-03-07 PROCEDURE — 99214 OFFICE O/P EST MOD 30 MIN: CPT | Mod: 25

## 2025-03-07 RX ORDER — MAVACAMTEN 5 MG/1
5 CAPSULE, GELATIN COATED ORAL DAILY
Qty: 30 | Refills: 0 | Status: ACTIVE | COMMUNITY
Start: 2025-03-07 | End: 1900-01-01

## 2025-03-08 LAB
ALBUMIN SERPL ELPH-MCNC: 4.8 G/DL
ALP BLD-CCNC: 64 U/L
ALT SERPL-CCNC: 27 U/L
ANION GAP SERPL CALC-SCNC: 11 MMOL/L
AST SERPL-CCNC: 27 U/L
BASOPHILS # BLD AUTO: 0.07 K/UL
BASOPHILS NFR BLD AUTO: 1.2 %
BILIRUB SERPL-MCNC: 0.6 MG/DL
BUN SERPL-MCNC: 15 MG/DL
CALCIUM SERPL-MCNC: 9.8 MG/DL
CHLORIDE SERPL-SCNC: 101 MMOL/L
CO2 SERPL-SCNC: 27 MMOL/L
CREAT SERPL-MCNC: 1.18 MG/DL
EGFRCR SERPLBLD CKD-EPI 2021: 70 ML/MIN/1.73M2
EOSINOPHIL # BLD AUTO: 0.23 K/UL
EOSINOPHIL NFR BLD AUTO: 3.9 %
GLUCOSE SERPL-MCNC: 102 MG/DL
HCT VFR BLD CALC: 46.5 %
HGB BLD-MCNC: 15.9 G/DL
IMM GRANULOCYTES NFR BLD AUTO: 0.3 %
LYMPHOCYTES # BLD AUTO: 1.18 K/UL
LYMPHOCYTES NFR BLD AUTO: 19.8 %
MAN DIFF?: NORMAL
MCHC RBC-ENTMCNC: 32 PG
MCHC RBC-ENTMCNC: 34.2 G/DL
MCV RBC AUTO: 93.6 FL
MONOCYTES # BLD AUTO: 0.62 K/UL
MONOCYTES NFR BLD AUTO: 10.4 %
NEUTROPHILS # BLD AUTO: 3.85 K/UL
NEUTROPHILS NFR BLD AUTO: 64.4 %
NT-PROBNP SERPL-MCNC: 678 PG/ML
PLATELET # BLD AUTO: 230 K/UL
POTASSIUM SERPL-SCNC: 4.5 MMOL/L
PROT SERPL-MCNC: 7.3 G/DL
RBC # BLD: 4.97 M/UL
RBC # FLD: 12.2 %
SODIUM SERPL-SCNC: 139 MMOL/L
TROPONIN-T, HIGH SENSITIVITY: 18 NG/L
WBC # FLD AUTO: 5.97 K/UL

## 2025-03-18 ENCOUNTER — NON-APPOINTMENT (OUTPATIENT)
Age: 65
End: 2025-03-18

## 2025-03-18 ENCOUNTER — APPOINTMENT (OUTPATIENT)
Dept: HEART AND VASCULAR | Facility: CLINIC | Age: 65
End: 2025-03-18

## 2025-03-18 VITALS
HEART RATE: 70 BPM | DIASTOLIC BLOOD PRESSURE: 78 MMHG | BODY MASS INDEX: 22.82 KG/M2 | WEIGHT: 142 LBS | SYSTOLIC BLOOD PRESSURE: 98 MMHG | OXYGEN SATURATION: 98 % | HEIGHT: 66 IN

## 2025-03-18 PROCEDURE — 99215 OFFICE O/P EST HI 40 MIN: CPT

## 2025-03-18 PROCEDURE — 93280 PM DEVICE PROGR EVAL DUAL: CPT

## 2025-03-18 RX ORDER — APIXABAN 5 MG/1
5 TABLET, FILM COATED ORAL
Qty: 180 | Refills: 3 | Status: ACTIVE | COMMUNITY
Start: 2025-03-18 | End: 1900-01-01

## 2025-03-20 ENCOUNTER — NON-APPOINTMENT (OUTPATIENT)
Age: 65
End: 2025-03-20

## 2025-03-20 ENCOUNTER — APPOINTMENT (OUTPATIENT)
Dept: HEART AND VASCULAR | Facility: CLINIC | Age: 65
End: 2025-03-20
Payer: COMMERCIAL

## 2025-03-20 VITALS
WEIGHT: 150 LBS | RESPIRATION RATE: 16 BRPM | HEART RATE: 68 BPM | HEIGHT: 66 IN | OXYGEN SATURATION: 97 % | BODY MASS INDEX: 24.11 KG/M2 | SYSTOLIC BLOOD PRESSURE: 112 MMHG | DIASTOLIC BLOOD PRESSURE: 70 MMHG

## 2025-03-20 DIAGNOSIS — I25.10 ATHEROSCLEROTIC HEART DISEASE OF NATIVE CORONARY ARTERY W/OUT ANGINA PECTORIS: ICD-10-CM

## 2025-03-20 DIAGNOSIS — I42.2 OTHER HYPERTROPHIC CARDIOMYOPATHY: ICD-10-CM

## 2025-03-20 DIAGNOSIS — E78.5 HYPERLIPIDEMIA, UNSPECIFIED: ICD-10-CM

## 2025-03-20 PROCEDURE — 99215 OFFICE O/P EST HI 40 MIN: CPT

## 2025-03-25 ENCOUNTER — APPOINTMENT (OUTPATIENT)
Dept: HEART AND VASCULAR | Facility: CLINIC | Age: 65
End: 2025-03-25
Payer: COMMERCIAL

## 2025-03-25 ENCOUNTER — APPOINTMENT (OUTPATIENT)
Dept: HEART AND VASCULAR | Facility: CLINIC | Age: 65
End: 2025-03-25

## 2025-03-25 VITALS
HEIGHT: 66 IN | WEIGHT: 150 LBS | SYSTOLIC BLOOD PRESSURE: 112 MMHG | HEART RATE: 58 BPM | DIASTOLIC BLOOD PRESSURE: 68 MMHG | OXYGEN SATURATION: 98 % | BODY MASS INDEX: 24.11 KG/M2

## 2025-03-25 DIAGNOSIS — I48.0 PAROXYSMAL ATRIAL FIBRILLATION: ICD-10-CM

## 2025-03-25 PROCEDURE — 99214 OFFICE O/P EST MOD 30 MIN: CPT

## 2025-03-25 PROCEDURE — 93280 PM DEVICE PROGR EVAL DUAL: CPT

## 2025-03-27 ENCOUNTER — NON-APPOINTMENT (OUTPATIENT)
Age: 65
End: 2025-03-27

## 2025-04-11 ENCOUNTER — NON-APPOINTMENT (OUTPATIENT)
Age: 65
End: 2025-04-11

## 2025-04-24 ENCOUNTER — INPATIENT (INPATIENT)
Facility: HOSPITAL | Age: 65
LOS: 6 days | Discharge: HOME | End: 2025-05-01
Attending: STUDENT IN AN ORGANIZED HEALTH CARE EDUCATION/TRAINING PROGRAM | Admitting: STUDENT IN AN ORGANIZED HEALTH CARE EDUCATION/TRAINING PROGRAM
Payer: COMMERCIAL

## 2025-04-24 ENCOUNTER — RESULT REVIEW (OUTPATIENT)
Age: 65
End: 2025-04-24

## 2025-04-24 VITALS
OXYGEN SATURATION: 95 % | DIASTOLIC BLOOD PRESSURE: 61 MMHG | SYSTOLIC BLOOD PRESSURE: 137 MMHG | WEIGHT: 164.69 LBS | TEMPERATURE: 98 F | HEIGHT: 66 IN | RESPIRATION RATE: 16 BRPM | HEART RATE: 50 BPM

## 2025-04-24 DIAGNOSIS — R78.81 BACTEREMIA: ICD-10-CM

## 2025-04-24 DIAGNOSIS — I48.91 UNSPECIFIED ATRIAL FIBRILLATION: ICD-10-CM

## 2025-04-24 DIAGNOSIS — Z86.79 PERSONAL HISTORY OF OTHER DISEASES OF THE CIRCULATORY SYSTEM: ICD-10-CM

## 2025-04-24 DIAGNOSIS — I44.2 ATRIOVENTRICULAR BLOCK, COMPLETE: ICD-10-CM

## 2025-04-24 LAB
A1C WITH ESTIMATED AVERAGE GLUCOSE RESULT: 5.5 % — SIGNIFICANT CHANGE UP (ref 4–5.6)
ALBUMIN SERPL ELPH-MCNC: 2.2 G/DL — LOW (ref 3.3–5)
ALP SERPL-CCNC: 130 U/L — HIGH (ref 40–120)
ALT FLD-CCNC: 37 U/L — SIGNIFICANT CHANGE UP (ref 10–45)
ANION GAP SERPL CALC-SCNC: 9 MMOL/L — SIGNIFICANT CHANGE UP (ref 5–17)
APPEARANCE UR: CLEAR — SIGNIFICANT CHANGE UP
APTT BLD: 30.1 SEC — SIGNIFICANT CHANGE UP (ref 26.1–36.8)
APTT BLD: 41.9 SEC — HIGH (ref 26.1–36.8)
APTT BLD: 45.4 SEC — HIGH (ref 26.1–36.8)
AST SERPL-CCNC: 58 U/L — HIGH (ref 10–40)
BASOPHILS # BLD AUTO: 0 K/UL — SIGNIFICANT CHANGE UP (ref 0–0.2)
BASOPHILS NFR BLD AUTO: 0 % — SIGNIFICANT CHANGE UP (ref 0–2)
BILIRUB SERPL-MCNC: 0.4 MG/DL — SIGNIFICANT CHANGE UP (ref 0.2–1.2)
BILIRUB UR-MCNC: NEGATIVE — SIGNIFICANT CHANGE UP
BLD GP AB SCN SERPL QL: NEGATIVE — SIGNIFICANT CHANGE UP
BUN SERPL-MCNC: 36 MG/DL — HIGH (ref 7–23)
CALCIUM SERPL-MCNC: 7.7 MG/DL — LOW (ref 8.4–10.5)
CHLORIDE SERPL-SCNC: 106 MMOL/L — SIGNIFICANT CHANGE UP (ref 96–108)
CHOLEST SERPL-MCNC: 97 MG/DL — SIGNIFICANT CHANGE UP
CO2 SERPL-SCNC: 24 MMOL/L — SIGNIFICANT CHANGE UP (ref 22–31)
COLOR SPEC: YELLOW — SIGNIFICANT CHANGE UP
CREAT SERPL-MCNC: 1.4 MG/DL — HIGH (ref 0.5–1.3)
DIFF PNL FLD: ABNORMAL
EGFR: 57 ML/MIN/1.73M2 — LOW
EGFR: 57 ML/MIN/1.73M2 — LOW
EOSINOPHIL # BLD AUTO: 0.21 K/UL — SIGNIFICANT CHANGE UP (ref 0–0.5)
EOSINOPHIL NFR BLD AUTO: 0.9 % — SIGNIFICANT CHANGE UP (ref 0–6)
ESTIMATED AVERAGE GLUCOSE: 111 MG/DL — SIGNIFICANT CHANGE UP (ref 68–114)
GLUCOSE SERPL-MCNC: 161 MG/DL — HIGH (ref 70–99)
GLUCOSE UR QL: NEGATIVE MG/DL — SIGNIFICANT CHANGE UP
HCT VFR BLD CALC: 28.8 % — LOW (ref 39–50)
HCT VFR BLD CALC: 30.2 % — LOW (ref 39–50)
HDLC SERPL-MCNC: 15 MG/DL — LOW
HGB BLD-MCNC: 10.1 G/DL — LOW (ref 13–17)
HGB BLD-MCNC: 10.6 G/DL — LOW (ref 13–17)
INR BLD: 1.93 — HIGH (ref 0.85–1.16)
KETONES UR-MCNC: NEGATIVE MG/DL — SIGNIFICANT CHANGE UP
LDLC SERPL-MCNC: 56 MG/DL — SIGNIFICANT CHANGE UP
LEUKOCYTE ESTERASE UR-ACNC: ABNORMAL
LIPID PNL WITH DIRECT LDL SERPL: 56 MG/DL — SIGNIFICANT CHANGE UP
LYMPHOCYTES # BLD AUTO: 0.4 K/UL — LOW (ref 1–3.3)
LYMPHOCYTES # BLD AUTO: 1.7 % — LOW (ref 13–44)
MAGNESIUM SERPL-MCNC: 2.2 MG/DL — SIGNIFICANT CHANGE UP (ref 1.6–2.6)
MCHC RBC-ENTMCNC: 31.6 PG — SIGNIFICANT CHANGE UP (ref 27–34)
MCHC RBC-ENTMCNC: 31.8 PG — SIGNIFICANT CHANGE UP (ref 27–34)
MCHC RBC-ENTMCNC: 35.1 G/DL — SIGNIFICANT CHANGE UP (ref 32–36)
MCHC RBC-ENTMCNC: 35.1 G/DL — SIGNIFICANT CHANGE UP (ref 32–36)
MCV RBC AUTO: 90 FL — SIGNIFICANT CHANGE UP (ref 80–100)
MCV RBC AUTO: 90.7 FL — SIGNIFICANT CHANGE UP (ref 80–100)
MONOCYTES # BLD AUTO: 0.61 K/UL — SIGNIFICANT CHANGE UP (ref 0–0.9)
MONOCYTES NFR BLD AUTO: 2.6 % — SIGNIFICANT CHANGE UP (ref 2–14)
NEUTROPHILS # BLD AUTO: 22.25 K/UL — HIGH (ref 1.8–7.4)
NEUTROPHILS NFR BLD AUTO: 93.9 % — HIGH (ref 43–77)
NITRITE UR-MCNC: NEGATIVE — SIGNIFICANT CHANGE UP
NONHDLC SERPL-MCNC: 82 MG/DL — SIGNIFICANT CHANGE UP
NRBC BLD AUTO-RTO: 0 /100 WBCS — SIGNIFICANT CHANGE UP (ref 0–0)
PH UR: 6 — SIGNIFICANT CHANGE UP (ref 5–8)
PLATELET # BLD AUTO: 270 K/UL — SIGNIFICANT CHANGE UP (ref 150–400)
PLATELET # BLD AUTO: 291 K/UL — SIGNIFICANT CHANGE UP (ref 150–400)
POTASSIUM SERPL-MCNC: 3.4 MMOL/L — LOW (ref 3.5–5.3)
POTASSIUM SERPL-SCNC: 3.4 MMOL/L — LOW (ref 3.5–5.3)
PROT SERPL-MCNC: 5.3 G/DL — LOW (ref 6–8.3)
PROT UR-MCNC: 30 MG/DL
PROTHROM AB SERPL-ACNC: 22 SEC — HIGH (ref 9.9–13.4)
RBC # BLD: 3.2 M/UL — LOW (ref 4.2–5.8)
RBC # BLD: 3.33 M/UL — LOW (ref 4.2–5.8)
RBC # FLD: 14.4 % — SIGNIFICANT CHANGE UP (ref 10.3–14.5)
RBC # FLD: 14.5 % — SIGNIFICANT CHANGE UP (ref 10.3–14.5)
RH IG SCN BLD-IMP: POSITIVE — SIGNIFICANT CHANGE UP
SODIUM SERPL-SCNC: 139 MMOL/L — SIGNIFICANT CHANGE UP (ref 135–145)
SP GR SPEC: 1.02 — SIGNIFICANT CHANGE UP (ref 1–1.03)
T4 AB SER-ACNC: 4.13 UG/DL — LOW (ref 4.5–11.7)
TRIGL SERPL-MCNC: 150 MG/DL — HIGH
TSH SERPL-MCNC: 2.7 UIU/ML — SIGNIFICANT CHANGE UP (ref 0.27–4.2)
UROBILINOGEN FLD QL: 0.2 MG/DL — SIGNIFICANT CHANGE UP (ref 0.2–1)
WBC # BLD: 21.18 K/UL — HIGH (ref 3.8–10.5)
WBC # BLD: 23.47 K/UL — HIGH (ref 3.8–10.5)
WBC # FLD AUTO: 21.18 K/UL — HIGH (ref 3.8–10.5)
WBC # FLD AUTO: 23.47 K/UL — HIGH (ref 3.8–10.5)

## 2025-04-24 PROCEDURE — 93010 ELECTROCARDIOGRAM REPORT: CPT

## 2025-04-24 PROCEDURE — 93306 TTE W/DOPPLER COMPLETE: CPT | Mod: 26

## 2025-04-24 PROCEDURE — 99223 1ST HOSP IP/OBS HIGH 75: CPT

## 2025-04-24 PROCEDURE — 99255 IP/OBS CONSLTJ NEW/EST HI 80: CPT

## 2025-04-24 PROCEDURE — 99254 IP/OBS CNSLTJ NEW/EST MOD 60: CPT

## 2025-04-24 RX ORDER — SENNA 187 MG
2 TABLET ORAL AT BEDTIME
Refills: 0 | Status: DISCONTINUED | OUTPATIENT
Start: 2025-04-24 | End: 2025-05-01

## 2025-04-24 RX ORDER — LOSARTAN POTASSIUM 100 MG/1
50 TABLET, FILM COATED ORAL DAILY
Refills: 0 | Status: DISCONTINUED | OUTPATIENT
Start: 2025-04-24 | End: 2025-04-27

## 2025-04-24 RX ORDER — OXYCODONE HYDROCHLORIDE 30 MG/1
10 TABLET ORAL ONCE
Refills: 0 | Status: DISCONTINUED | OUTPATIENT
Start: 2025-04-24 | End: 2025-04-24

## 2025-04-24 RX ORDER — CEFAZOLIN SODIUM IN 0.9 % NACL 3 G/100 ML
2000 INTRAVENOUS SOLUTION, PIGGYBACK (ML) INTRAVENOUS EVERY 8 HOURS
Refills: 0 | Status: DISCONTINUED | OUTPATIENT
Start: 2025-04-24 | End: 2025-04-24

## 2025-04-24 RX ORDER — CEFAZOLIN SODIUM IN 0.9 % NACL 3 G/100 ML
2 INTRAVENOUS SOLUTION, PIGGYBACK (ML) INTRAVENOUS
Refills: 0 | DISCHARGE

## 2025-04-24 RX ORDER — NAFCILLIN 2 G/100ML
INJECTION, SOLUTION INTRAVENOUS
Refills: 0 | Status: DISCONTINUED | OUTPATIENT
Start: 2025-04-24 | End: 2025-05-01

## 2025-04-24 RX ORDER — POLYETHYLENE GLYCOL 3350 17 G/17G
17 POWDER, FOR SOLUTION ORAL
Refills: 0 | Status: DISCONTINUED | OUTPATIENT
Start: 2025-04-24 | End: 2025-05-01

## 2025-04-24 RX ORDER — CEFAZOLIN SODIUM IN 0.9 % NACL 3 G/100 ML
2000 INTRAVENOUS SOLUTION, PIGGYBACK (ML) INTRAVENOUS ONCE
Refills: 0 | Status: COMPLETED | OUTPATIENT
Start: 2025-04-24 | End: 2025-04-24

## 2025-04-24 RX ORDER — AZELASTINE HYDROCHLORIDE 137 UG/1
1 SPRAY, METERED NASAL
Refills: 0 | DISCHARGE

## 2025-04-24 RX ORDER — TAURINE 100 %
1 POWDER (GRAM) MISCELLANEOUS
Refills: 0 | DISCHARGE

## 2025-04-24 RX ORDER — ACETAMINOPHEN 500 MG/5ML
650 LIQUID (ML) ORAL EVERY 6 HOURS
Refills: 0 | Status: DISCONTINUED | OUTPATIENT
Start: 2025-04-24 | End: 2025-05-01

## 2025-04-24 RX ORDER — UBIDECARENONE 100 MG
1 CAPSULE ORAL
Refills: 0 | DISCHARGE

## 2025-04-24 RX ORDER — HEPARIN SODIUM 1000 [USP'U]/ML
INJECTION INTRAVENOUS; SUBCUTANEOUS
Qty: 25000 | Refills: 0 | Status: DISCONTINUED | OUTPATIENT
Start: 2025-04-24 | End: 2025-04-25

## 2025-04-24 RX ORDER — ACETAMINOPHEN 500 MG/5ML
1000 LIQUID (ML) ORAL ONCE
Refills: 0 | Status: COMPLETED | OUTPATIENT
Start: 2025-04-24 | End: 2025-04-24

## 2025-04-24 RX ORDER — ALPRAZOLAM 0.5 MG
0.25 TABLET, EXTENDED RELEASE 24 HR ORAL EVERY 8 HOURS
Refills: 0 | Status: DISCONTINUED | OUTPATIENT
Start: 2025-04-24 | End: 2025-05-01

## 2025-04-24 RX ORDER — NAFCILLIN 2 G/100ML
2 INJECTION, SOLUTION INTRAVENOUS EVERY 4 HOURS
Refills: 0 | Status: DISCONTINUED | OUTPATIENT
Start: 2025-04-24 | End: 2025-05-01

## 2025-04-24 RX ORDER — HEPARIN SODIUM 1000 [USP'U]/ML
3000 INJECTION INTRAVENOUS; SUBCUTANEOUS EVERY 6 HOURS
Refills: 0 | Status: DISCONTINUED | OUTPATIENT
Start: 2025-04-24 | End: 2025-04-25

## 2025-04-24 RX ORDER — HEPARIN SODIUM 1000 [USP'U]/ML
6000 INJECTION INTRAVENOUS; SUBCUTANEOUS EVERY 6 HOURS
Refills: 0 | Status: DISCONTINUED | OUTPATIENT
Start: 2025-04-24 | End: 2025-04-25

## 2025-04-24 RX ORDER — ROSUVASTATIN CALCIUM 20 MG/1
1 TABLET, FILM COATED ORAL
Refills: 0 | DISCHARGE

## 2025-04-24 RX ORDER — CEFAZOLIN SODIUM IN 0.9 % NACL 3 G/100 ML
INTRAVENOUS SOLUTION, PIGGYBACK (ML) INTRAVENOUS
Refills: 0 | Status: DISCONTINUED | OUTPATIENT
Start: 2025-04-24 | End: 2025-04-24

## 2025-04-24 RX ORDER — NAFCILLIN 2 G/100ML
2 INJECTION, SOLUTION INTRAVENOUS ONCE
Refills: 0 | Status: COMPLETED | OUTPATIENT
Start: 2025-04-24 | End: 2025-04-24

## 2025-04-24 RX ORDER — OMEGA-3-ACID ETHYL ESTERS CAPSULES 1 G/1
1 CAPSULE, LIQUID FILLED ORAL
Refills: 0 | DISCHARGE

## 2025-04-24 RX ADMIN — HEPARIN SODIUM 1700 UNIT(S)/HR: 1000 INJECTION INTRAVENOUS; SUBCUTANEOUS at 19:54

## 2025-04-24 RX ADMIN — HEPARIN SODIUM 1500 UNIT(S)/HR: 1000 INJECTION INTRAVENOUS; SUBCUTANEOUS at 13:29

## 2025-04-24 RX ADMIN — HEPARIN SODIUM 1700 UNIT(S)/HR: 1000 INJECTION INTRAVENOUS; SUBCUTANEOUS at 20:19

## 2025-04-24 RX ADMIN — NAFCILLIN 200 GRAM(S): 2 INJECTION, SOLUTION INTRAVENOUS at 21:13

## 2025-04-24 RX ADMIN — Medication 20 MILLIEQUIVALENT(S): at 10:16

## 2025-04-24 RX ADMIN — NAFCILLIN 200 GRAM(S): 2 INJECTION, SOLUTION INTRAVENOUS at 16:47

## 2025-04-24 RX ADMIN — Medication 650 MILLIGRAM(S): at 21:16

## 2025-04-24 RX ADMIN — Medication 400 MILLIGRAM(S): at 06:55

## 2025-04-24 RX ADMIN — Medication 2 TABLET(S): at 21:19

## 2025-04-24 RX ADMIN — Medication 650 MILLIGRAM(S): at 14:57

## 2025-04-24 RX ADMIN — Medication 40 MILLIEQUIVALENT(S): at 05:27

## 2025-04-24 RX ADMIN — HEPARIN SODIUM 1300 UNIT(S)/HR: 1000 INJECTION INTRAVENOUS; SUBCUTANEOUS at 06:35

## 2025-04-24 RX ADMIN — Medication 650 MILLIGRAM(S): at 15:57

## 2025-04-24 RX ADMIN — Medication 40 MILLIGRAM(S): at 06:38

## 2025-04-24 RX ADMIN — NAFCILLIN 200 GRAM(S): 2 INJECTION, SOLUTION INTRAVENOUS at 12:38

## 2025-04-24 RX ADMIN — LOSARTAN POTASSIUM 50 MILLIGRAM(S): 100 TABLET, FILM COATED ORAL at 06:38

## 2025-04-24 RX ADMIN — HEPARIN SODIUM 3000 UNIT(S): 1000 INJECTION INTRAVENOUS; SUBCUTANEOUS at 20:04

## 2025-04-24 RX ADMIN — Medication 650 MILLIGRAM(S): at 20:03

## 2025-04-24 RX ADMIN — Medication 100 MILLIGRAM(S): at 10:00

## 2025-04-24 RX ADMIN — Medication 1000 MILLIGRAM(S): at 07:40

## 2025-04-24 NOTE — PATIENT PROFILE ADULT - PUBLIC BENEFITS
CC:   Chief Complaint   Patient presents with   â¢ Animal Bite     bit by dog today. 3 small puncture wounds to top of left hand. NO active bleeding at this time. SUBJECTIVE:    Azar Wilkinson is a 31year old female who presents to [facility] with concerns of a dog bite to the left hand which occured several hours ago. She admits pain, denies swelling, denies redness, denies discharge from the wound(s), denies fever, denies chills, denies nausea, denies vomiting, and denies headache. Aggravating factors: pressure  Alleviating factors: None     The remainder of the ROS is negative. There is no problem list on file for this patient. Social Hx: non-smoking household  ALLERGIES:  Penicillin g    OBJECTIVE:    Vitals:    07/06/19 1619   BP: 130/72   Pulse: 104   Resp: 20   Temp: 100.4 Â°F (38 Â°C)   TempSrc: Tympanic      General appearance: Alert, well hydrated, in no apparent distress  Skin: 2 puncture wounds and small laceration over the dorsum of the hand. No surrounding redness. Normal hand motion. Normal hand motion. ASSESSMENT/PLAN:    BITE/PUNCTURE WOUND (left hand) - augmentin as directed. F/u immediately if any signs of infection. May use tylenol or motrin as directed for pain. Return immediately if any worsening of the appearance of the bite site or appears to be infected. Diagnosis and prognosis, treatment and side-effects were explained and all questions were answered satisfactorily and there were no further questions upon discharge.     Olivia Wu, DO
PT states she is diabetic and doesn't want wound to get infected. Wound cleaned and irrigated with soapy water.
no

## 2025-04-24 NOTE — PROGRESS NOTE ADULT - PROBLEM SELECTOR PLAN 1
- Presented [facility] w/ fever/chills/fatigue, found to be septic w/ BCx/UCx (+) for MSSA.   - Per verbal report (documentation not transferred with patient), TTE was concerning for aortic valve vegetation, but DONNY was negative for vegetation.   - 2wks prior to presentation pt had root canal.   - Concern that PPM could be source of infection.   - CONT: Cefazolin 2g IV q8hrs.   - PLAN: EP consult in AM (known to Dr. Rivero). ID consult in AM. BCx/UCx and TTE/DONNY were requested to be faxed (not sent with patient during transfer). - Presented [facility] 4/17 w/ fever/chills/fatigue, found to be septic w/ BCx/UCx (+) for MSSA.   - Per verbal report (documentation not transferred with patient), TTE was concerning for aortic valve vegetation, but DONNY was negative for vegetation.      - images obtained, will upload to our system  - 2wks prior to presentation pt had root canal.   - Concern that PPM could be source of infection.   - Patient denies penicillin allergy  PLAN:   - EP consulted, f/u recs (known to Dr. Rivero)  - ID consulted, appreciate recs --> will stop cefazolin and START nafcillin 2g q4h  - F[email or web address]  - F/u TTE - Presented [facility] 4/17 w/ fever/chills/fatigue, found to be septic w/ BCx/UCx (+) for MSSA.   - Per verbal report (documentation not transferred with patient), TTE was concerning for aortic valve vegetation, but DONNY was negative for vegetation.      - images obtained, will upload to our system  - 2wks prior to presentation pt had root canal.   - Concern that PPM could be source of infection.   - Patient denies penicillin allergy  PLAN:   - EP consulted (known to Dr. Rivero), plan for explantation + Micra placement on 4/25       -- NPO at midnight, active T&S  - ID consulted, appreciate recs --> will stop cefazolin and START nafcillin 2g q4h  - F/u records from [facility]  - TTE 4/24: LVEF 60%, no regional wall motion abnormalities, severely dilated LA, mild to mod MR, fibrocalcific aortic valve, turbulent flow in LVOT - Presented [facility] 4/17 w/ fever/chills/fatigue, found to be septic w/ BCx/UCx (+) for MSSA.   - Per verbal report (documentation not transferred with patient), TTE was concerning for aortic valve vegetation, but DONNY was negative for vegetation.      - images obtained, will upload to our system  - 2wks prior to presentation pt had root canal.   - Concern that PPM could be source of infection.   - Patient denies penicillin allergy  PLAN:   - EP consulted (known to Dr. Rivero), plan for explantation + Micra placement on 4/25       -- NPO at midnight, active T&S  - ID consulted, appreciate recs --> will stop cefazolin and START nafcillin 2g q4h       -- daily blood cx for now       -- gallium scan ordered (hx of titanium screws in C-Spine)  - F/u records from [facility]  - TTE 4/24: LVEF 60%, no regional wall motion abnormalities, severely dilated LA, mild to mod MR, fibrocalcific aortic valve, turbulent flow in LVOT

## 2025-04-24 NOTE — PATIENT PROFILE ADULT - NSPROIMPLANTSMEDDEV_GEN_A_NUR
CT calcium score 8/10/2021 noted. Order for cardiac risk assessment.    LDL 7/23/2021    Component      Latest Ref Rng & Units 7/23/2021   Cholesterol (External)      100 - 199 mg/dL 315 (A)   Triglycerides (External)      0 - 149 mg/dL 72   HDL Cholesterol (External)      >39 - na mg/dL 116   LDL Cholesterol Calculated (External)      0 - 99 mg/dL 189 (A)   LDL Cholesterol (External)      na - na mg/dL na   Non HDL Cholesterol (External)      na - na mg/dL na     CT score:  Left main coronary artery: 0  Left anterior descending coronary artery: 0  Circumflex coronary artery: 0   Right coronary artery: Unable to evaluate.  TOTAL CALCIUM SCORE: 0    Radiology view:  No abnormally enlarged mediastinal lymph nodes. Hypodensity in the right hepatic lobe noted on the final image measuring up to 2.9 cm, though unable to distinguish mass versus cyst. No acute osseous  abnormality. Nodule noted in the right lower lobe measuring up to 8 cm. Additional nodule in the right middle lung along the minor fissure measuring up to 5 mm. Recommend follow-up CT examination in 3-6  Months.    Will message Dr. Richmond to review       left ACW PPM

## 2025-04-24 NOTE — CONSULT NOTE ADULT - SUBJECTIVE AND OBJECTIVE BOX
Electrophysiology Consult Note:     CHIEF COMPLAINT:  Patient is a 64y old  Male who presents with a chief complaint of MSSA bacteremia (24 Apr 2025 12:58)        HISTORY OF PRESENT ILLNESS:   63 y/o active M with h/o HTN, HLD, HOCM with CHB, s/p dual chamber PPM ([employer]) in 1/2025 at [facility], pAFib noted on device in 3/16/25 (started Eliquis, last dose 4/23 PM), and recent dental work few weeks ago (root canal, crown) p/w fever, fatigue, body aches for 10 days. Went to [facility] and found to have MSSA bacteremia. Was started on IV cefazolin. Reportedly had TTE c/f aortic valve vegetation, negative DONNY, CT AP without acute findings, urine culture positive for MSSA (likely 2/2 hematogenous spread) and persistent bacteremia. Patient notes he had a root canal a couple weeks ago- denies dental pain. Reports some pain in neck that started today. Denies other back or joint pains. Transferred to [facility] for device explant evaluation.         PAST MEDICAL & SURGICAL HISTORY:  as above   PPM  Cervical spinal surgery     FAMILY HISTORY:  n/a    SOCIAL HISTORY:  -lives in [place], [place] with wife   -works as    -denies tobacco or recreational drug use   -social etoh       Allergies  No Known Allergies      MEDICATIONS  (STANDING):  heparin  Infusion.  Unit(s)/Hr (13 mL/Hr) IV Continuous <Continuous>  losartan 50 milliGRAM(s) Oral daily  nafcillin  IVPB      nafcillin  IVPB 2 Gram(s) IV Intermittent every 4 hours  pantoprazole    Tablet 40 milliGRAM(s) Oral before breakfast  senna 2 Tablet(s) Oral at bedtime    MEDICATIONS  (PRN):  ALPRAZolam 0.25 milliGRAM(s) Oral every 8 hours PRN anxiety  heparin   Injectable 6000 Unit(s) IV Push every 6 hours PRN For aPTT less than 40  heparin   Injectable 3000 Unit(s) IV Push every 6 hours PRN For aPTT between 40 - 57  polyethylene glycol 3350 17 Gram(s) Oral two times a day PRN Constipation        REVIEW OF SYSTEMS:  CONSTITUTIONAL: No fever, weight loss, or fatigue  EYES: No eye pain, visual disturbances, or discharge  ENMT:  No difficulty hearing, tinnitus, vertigo; No sinus or throat pain  NECK: No pain or stiffness  RESPIRATORY: No cough, wheezing, chills or hemoptysis; No Shortness of Breath  CARDIOVASCULAR: No chest pain, palpitations, dizziness, or leg swelling  GASTROINTESTINAL: No abdominal or epigastric pain. No nausea, vomiting, or hematemesis; No diarrhea or constipation. No melena or hematochezia.  GENITOURINARY: No dysuria, frequency, hematuria, or incontinence  NEUROLOGICAL: No headaches, memory loss, loss of strength, numbness, or tremors  SKIN: No itching, burning, rashes, or lesions   LYMPH Nodes: No enlarged glands  ENDOCRINE: No heat or cold intolerance; No hair loss  MUSCULOSKELETAL: No joint pain or swelling; No muscle, back, or extremity pain  PSYCHIATRIC: No depression, anxiety, mood swings, or difficulty sleeping  HEME/LYMPH: No easy bruising, or bleeding gums  ALLERY AND IMMUNOLOGIC: No hives or eczema	      PHYSICAL EXAM:  Vital Signs Last 24 Hrs  T(C): 36.9 (24 Apr 2025 12:42), Max: 39 (24 Apr 2025 06:26)  T(F): 98.5 (24 Apr 2025 12:42), Max: 102.2 (24 Apr 2025 06:26)  HR: 50 (24 Apr 2025 12:42) (50 - 64)  BP: 156/71 (24 Apr 2025 12:42) (135/61 - 156/71)  BP(mean): 102 (24 Apr 2025 12:42) (88 - 102)  RR: 19 (24 Apr 2025 12:42) (16 - 20)  SpO2: 97% (24 Apr 2025 12:42) (94% - 97%)    Parameters below as of 24 Apr 2025 12:42  Patient On (Oxygen Delivery Method): room air        Constitutional: NAD	  HEENT:   Normal oral mucosa, PERRL, EOMI	  CVS: Normal S1 / S2, RRR, No murmurs. Left upper chest wall PPM site without signs of pocket infection.   Pulm: CTA. No wheeze or rale  GI:  + BS, soft, NT / ND   Ext: upper ext edema.   Neurologic: A&O x 3, Non-focal  Psych: Pleasant, has good insight  Skin: No rash or lesion       	  LABS:	                         10.6   21.18 )-----------( 291      ( 24 Apr 2025 12:11 )             30.2     04-24    139  |  106  |  36[H]  ----------------------------<  161[H]  3.4[L]   |  24  |  1.40[H]    Ca    7.7[L]      24 Apr 2025 02:50  Mg     2.2     04-24    TPro  5.3[L]  /  Alb  2.2[L]  /  TBili  0.4  /  DBili  x   /  AST  58[H]  /  ALT  37  /  AlkPhos  130[H]  04-24    EKG: NSR with .   Telemetry: AS-.     Echo 3/7/25:   1. Left ventricular cavity is normal in size. Left ventricular systolic function is normal with an ejection fraction of 68 % by Pineda's method of disks.  2. Mild left ventricular hypertrophy.  3. The left ventricular diastolic function is indeterminate.  4. Normal right ventricular cavity size, with normal wall thickness, and normal right ventricular systolic function.  5. Left atrium is severely dilated.  6. Chordal systolic anterior motion is present. The left ventricular outflow tract resting gradient is 31 mmHg.  7. Estimated pulmonary artery systolic pressure is 40 mmHg.  8. No pericardial effusion seen.   Electrophysiology Consult Note:     CHIEF COMPLAINT:  Patient is a 64y old  Male who presents with a chief complaint of MSSA bacteremia (24 Apr 2025 12:58)        HISTORY OF PRESENT ILLNESS:   63 y/o active M with h/o HTN, HLD, HOCM with CHB, s/p dual chamber PPM (Biotronik) in 1/2025 at [facility], pAFib noted on device in 3/16/25 (started Eliquis, last dose 4/23 PM), and recent dental work few weeks ago (root canal, crown) p/w fever, fatigue, body aches for 10 days. Went to [facility] and found to have MSSA bacteremia. Was started on IV cefazolin. Reportedly had TTE c/f aortic valve vegetation, negative DONNY, CT AP without acute findings, urine culture positive for MSSA (likely 2/2 hematogenous spread) and persistent bacteremia. Patient notes he had a root canal a couple weeks ago- denies dental pain. Reports some pain in neck that started today. Denies other back or joint pains. Transferred to [facility] for device explant evaluation.         PAST MEDICAL & SURGICAL HISTORY:  as above   PPM  Cervical spinal surgery     FAMILY HISTORY:  n/a    SOCIAL HISTORY:  -lives in [place], [place] with wife   -works as    -denies tobacco or recreational drug use   -social etoh       Allergies  No Known Allergies      MEDICATIONS  (STANDING):  heparin  Infusion.  Unit(s)/Hr (13 mL/Hr) IV Continuous <Continuous>  losartan 50 milliGRAM(s) Oral daily  nafcillin  IVPB      nafcillin  IVPB 2 Gram(s) IV Intermittent every 4 hours  pantoprazole    Tablet 40 milliGRAM(s) Oral before breakfast  senna 2 Tablet(s) Oral at bedtime    MEDICATIONS  (PRN):  ALPRAZolam 0.25 milliGRAM(s) Oral every 8 hours PRN anxiety  heparin   Injectable 6000 Unit(s) IV Push every 6 hours PRN For aPTT less than 40  heparin   Injectable 3000 Unit(s) IV Push every 6 hours PRN For aPTT between 40 - 57  polyethylene glycol 3350 17 Gram(s) Oral two times a day PRN Constipation        REVIEW OF SYSTEMS:  CONSTITUTIONAL: No fever, weight loss, or fatigue  EYES: No eye pain, visual disturbances, or discharge  ENMT:  No difficulty hearing, tinnitus, vertigo; No sinus or throat pain  NECK: No pain or stiffness  RESPIRATORY: No cough, wheezing, chills or hemoptysis; No Shortness of Breath  CARDIOVASCULAR: No chest pain, palpitations, dizziness, or leg swelling  GASTROINTESTINAL: No abdominal or epigastric pain. No nausea, vomiting, or hematemesis; No diarrhea or constipation. No melena or hematochezia.  GENITOURINARY: No dysuria, frequency, hematuria, or incontinence  NEUROLOGICAL: No headaches, memory loss, loss of strength, numbness, or tremors  SKIN: No itching, burning, rashes, or lesions   LYMPH Nodes: No enlarged glands  ENDOCRINE: No heat or cold intolerance; No hair loss  MUSCULOSKELETAL: No joint pain or swelling; No muscle, back, or extremity pain  PSYCHIATRIC: No depression, anxiety, mood swings, or difficulty sleeping  HEME/LYMPH: No easy bruising, or bleeding gums  ALLERY AND IMMUNOLOGIC: No hives or eczema	      PHYSICAL EXAM:  Vital Signs Last 24 Hrs  T(C): 36.9 (24 Apr 2025 12:42), Max: 39 (24 Apr 2025 06:26)  T(F): 98.5 (24 Apr 2025 12:42), Max: 102.2 (24 Apr 2025 06:26)  HR: 50 (24 Apr 2025 12:42) (50 - 64)  BP: 156/71 (24 Apr 2025 12:42) (135/61 - 156/71)  BP(mean): 102 (24 Apr 2025 12:42) (88 - 102)  RR: 19 (24 Apr 2025 12:42) (16 - 20)  SpO2: 97% (24 Apr 2025 12:42) (94% - 97%)    Parameters below as of 24 Apr 2025 12:42  Patient On (Oxygen Delivery Method): room air        Constitutional: NAD	  HEENT:   Normal oral mucosa, PERRL, EOMI	  CVS: Normal S1 / S2, RRR, No murmurs. Left upper chest wall PPM site without signs of pocket infection.   Pulm: CTA. No wheeze or rale  GI:  + BS, soft, NT / ND   Ext: upper ext edema.   Neurologic: A&O x 3, Non-focal  Psych: Pleasant, has good insight  Skin: No rash or lesion       	  LABS:	                         10.6   21.18 )-----------( 291      ( 24 Apr 2025 12:11 )             30.2     04-24    139  |  106  |  36[H]  ----------------------------<  161[H]  3.4[L]   |  24  |  1.40[H]    Ca    7.7[L]      24 Apr 2025 02:50  Mg     2.2     04-24    TPro  5.3[L]  /  Alb  2.2[L]  /  TBili  0.4  /  DBili  x   /  AST  58[H]  /  ALT  37  /  AlkPhos  130[H]  04-24    EKG: NSR with .   Telemetry: AS-.     Echo 3/7/25:   1. Left ventricular cavity is normal in size. Left ventricular systolic function is normal with an ejection fraction of 68 % by Pineda's method of disks.  2. Mild left ventricular hypertrophy.  3. The left ventricular diastolic function is indeterminate.  4. Normal right ventricular cavity size, with normal wall thickness, and normal right ventricular systolic function.  5. Left atrium is severely dilated.  6. Chordal systolic anterior motion is present. The left ventricular outflow tract resting gradient is 31 mmHg.  7. Estimated pulmonary artery systolic pressure is 40 mmHg.  8. No pericardial effusion seen.    PPM interrogation:   Biotronik implanted 1/22/25.    Pacing mode: DDD  bpm.   AP 14%   99%  RA lead: Sense 1.5 mv.  Threshold: 0.4V at 0.4 ms.  Imp: 488 ohms  RV lead: CHB (no escape > 30 bpm). Threshold: 1.1 V at 0.4 ms.  Impedance: 527 ohms   No events since last interrogated.   Prior event include: AFIB episode on 3/16/25 (duration 18 mins) with  during AFIB due to the nature of his CHB.

## 2025-04-24 NOTE — PROGRESS NOTE ADULT - ASSESSMENT
63yo M w/ PMHx of HTN, HLD, HCM (follows w/ Dr. Sapp), pAFib (on Eliquis, last dose 4/23 PM), hx of CHB (s/p PPM 1/2025) who initially presented to [facility] with fevers, body aches, fatigue. Pt found to be septic and w/ BCx/UCx (+) for MSSA. Of note, patient had root canal 2wks prior to presentation. Patient was started on Cefazolin. Per verbal report to RN (documentation was not transferred with the patient), TTE was concerning for aortic valve vegitation, but subsequent DONNY was negative. At this time concern that infection source is patient's PPM. On arrival to [facility], pt reports feeling "run down", but no CP, palpitations, dizziness, abdominal pain, N/V, fever/chills, orthopnea. Plan for EP consult in morning to discuss suspected PPM infection (patient known to EP service).    63yo M w/ PMHx of HTN, HLD, HCM (follows w/ Dr. Sapp), pAFib (on Eliquis, last dose 4/23 PM), hx of CHB (s/p PPM 1/2025) who initially presented to [facility] with fevers, body aches, fatigue. Pt found to be septic and w/ BCx/UCx (+) for MSSA. Of note, patient had root canal 2wks prior to presentation. Patient was started on Cefazolin. Per verbal report to RN (documentation was not transferred with the patient), TTE was concerning for aortic valve vegitation, but subsequent DONNY was negative. At this time concern that infection source is patient's PPM.

## 2025-04-24 NOTE — H&P ADULT - PROBLEM SELECTOR PLAN 2
- Pt has HCM, follows w/ Dr. Bib Sapp.   - TTE (3/7/25): EF 65%, LVH, LVOT (gradient 15mmHg at rest, 88mmHg w/ valsalva), ABDULLAHI of AMVL and chordal apparatus, mild LA/RA dilation, trace MR, aortic stenosis (MG 10, PG 20), small pericardial effusion w/o tamponade.

## 2025-04-24 NOTE — H&P ADULT - ASSESSMENT
65yo M w/ PMHx of HTN, HLD, HCM (follows w/ Dr. Sapp), pAFib (on Eliquis, last dose 4/23 PM), hx of CHB (s/p PPM 1/2025) who initially presented to [facility] with fevers, body aches, fatigue. Pt found to be septic and w/ BCx/UCx (+) for MSSA. Of note, patient had root canal 2wks prior to presentation. Patient was started on Cefazolin. Per verbal report to RN (documentation was not transferred with the patient), TTE was concerning for aortic valve vegitation, but subsequent DONNY was negative. At this time concern that infection source is patient's PPM. On arrival to [facility], pt reports feeling "run down", but no CP, palpitations, dizziness, abdominal pain, N/V, fever/chills, orthopnea. Plan for EP consult in morning to discuss suspected PPM infection (patient known to EP service).     ### BACTEREMIA  - Presented [facility] w/ fever/chills/fatigue, found to be septic w/ BCx/UCx (+) for MSSA.   - Per verbal report (documentation not transferred with patient), TTE was concerning for aortic valve vegetation    ### CKD  - Cr at transferring hospital was 1.34.   - f/u renal labs, continue to monitor.     ### AFIB  - Pt w/ Hx of AFib (was noted on PPM that was placed recently).   - CONT: Heparin gtt  - Holding Eliquis in anticiaption for possible procedure (last dose 4/23/25 PM).     ### HYPERTROPHIC CARDIOMYOPATHY  - Pt reports Hx of HCM, follows w/ Dr. Bib Sapp   - F/U TTE      DVT ppx: Heparin gtt  Dispo: EP consult in AM       63yo M w/ PMHx of HTN, HLD, HCM (follows w/ Dr. Sapp), pAFib (on Eliquis, last dose 4/23 PM), hx of CHB (s/p PPM 1/2025) who initially presented to [facility] with fevers, body aches, fatigue. Pt found to be septic and w/ BCx/UCx (+) for MSSA. Of note, patient had root canal 2wks prior to presentation. Patient was started on Cefazolin. Per verbal report to RN (documentation was not transferred with the patient), TTE was concerning for aortic valve vegitation, but subsequent DONNY was negative. At this time concern that infection source is patient's PPM. On arrival to [facility], pt reports feeling "run down", but no CP, palpitations, dizziness, abdominal pain, N/V, fever/chills, orthopnea. Plan for EP consult in morning to discuss suspected PPM infection (patient known to EP service).

## 2025-04-24 NOTE — PROGRESS NOTE ADULT - SUBJECTIVE AND OBJECTIVE BOX
OVERNIGHT EVENTS: No acute events    SUBJECTIVE: Patient seen and examined.     ROS: otherwise negative      T(C): 36.8 (25 @ 07:40), Max: 39 (25 @ 06:26)  HR: 64 (25 @ 06:26) (50 - 64)  BP: 156/66 (25 @ 06:26) (137/61 - 156/66)  RR: 20 (25 @ 06:26) (16 - 20)  SpO2: 97% (25 @ 06:26) (95% - 97%)  Wt(kg): --Vital Signs Last 24 Hrs  T(C): 36.8 (2025 07:40), Max: 39 (2025 06:26)  T(F): 98.2 (2025 07:40), Max: 102.2 (2025 06:26)  HR: 64 (2025 06:26) (50 - 64)  BP: 156/66 (2025 06:26) (137/61 - 156/66)  BP(mean): 95 (2025 06:26) (88 - 95)  RR: 20 (2025 06:26) (16 - 20)  SpO2: 97% (2025 06:26) (95% - 97%)    Parameters below as of 2025 06:26  Patient On (Oxygen Delivery Method): room air        PHYSICAL EXAM:  Constitutional: resting comfortably in bed; NAD  Head: NC/AT  Eyes: PERRL, EOMI, anicteric sclera  ENT: no nasal discharge; MMM  Neck: supple; no JVD or thyromegaly  Respiratory: CTA B/L; no W/R/R, no retractions  Cardiac: +S1/S2; RRR; no M/R/G  Gastrointestinal: soft, NT/ND; no rebound or guarding; +BSx4  Back: spine midline, no bony tenderness or step-offs; no CVAT B/L  Extremities: WWP, no clubbing or cyanosis; no peripheral edema. Capillary refill <2 sec  Musculoskeletal: NROM x4; no joint swelling, tenderness or erythema  Vascular: 2+ radial, DP/PT pulses B/L  Dermatologic: skin warm, dry and intact; no rashes, wounds, or scars  Lymphatic: no submandibular or cervical LAD  Neurologic: AAOx3; CNII-XII grossly intact; no focal deficits  Psychiatric: affect and characteristics of appearance, verbalizations, behaviors are appropriate    LABS:                        10.1   23.47 )-----------( 270      ( 2025 02:50 )             28.8     04-24    139  |  106  |  36[H]  ----------------------------<  161[H]  3.4[L]   |  24  |  1.40[H]    Ca    7.7[L]      2025 02:50  Mg     2.2     04-24    TPro  5.3[L]  /  Alb  2.2[L]  /  TBili  0.4  /  DBili  x   /  AST  58[H]  /  ALT  37  /  AlkPhos  130[H]  04-24      PT/INR - ( 2025 02:50 )   PT: 22.0 sec;   INR: 1.93          PTT - ( 2025 02:50 )  PTT:30.1 sec  Urinalysis Basic - ( 2025 03:07 )    Color: Yellow / Appearance: Clear / S.017 / pH: x  Gluc: x / Ketone: Negative mg/dL  / Bili: Negative / Urobili: 0.2 mg/dL   Blood: x / Protein: 30 mg/dL / Nitrite: Negative   Leuk Esterase: Small / RBC: 380 /HPF / WBC 26 /HPF   Sq Epi: x / Non Sq Epi: 2 /HPF / Bacteria: Negative /HPF      CAPILLARY BLOOD GLUCOSE            Urinalysis Basic - ( 2025 03:07 )    Color: Yellow / Appearance: Clear / S.017 / pH: x  Gluc: x / Ketone: Negative mg/dL  / Bili: Negative / Urobili: 0.2 mg/dL   Blood: x / Protein: 30 mg/dL / Nitrite: Negative   Leuk Esterase: Small / RBC: 380 /HPF / WBC 26 /HPF   Sq Epi: x / Non Sq Epi: 2 /HPF / Bacteria: Negative /HPF        MEDICATIONS  (STANDING):  ceFAZolin   IVPB 2000 milliGRAM(s) IV Intermittent every 8 hours  ceFAZolin   IVPB      heparin  Infusion.  Unit(s)/Hr (13 mL/Hr) IV Continuous <Continuous>  losartan 50 milliGRAM(s) Oral daily  pantoprazole    Tablet 40 milliGRAM(s) Oral before breakfast  potassium chloride    Tablet ER 20 milliEquivalent(s) Oral once  senna 2 Tablet(s) Oral at bedtime    MEDICATIONS  (PRN):  ALPRAZolam 0.25 milliGRAM(s) Oral every 8 hours PRN anxiety  heparin   Injectable 6000 Unit(s) IV Push every 6 hours PRN For aPTT less than 40  heparin   Injectable 3000 Unit(s) IV Push every 6 hours PRN For aPTT between 40 - 57  polyethylene glycol 3350 17 Gram(s) Oral two times a day PRN Constipation      RADIOLOGY & ADDITIONAL TESTS: Reviewed   OVERNIGHT EVENTS: Febrile to 101.2F at 6am.     SUBJECTIVE: Patient seen and examined. Reports feeling overall fatigued. Denies chest pain, nausea/vomiting, abdominal pain. No recent falls/injuries, no recent travel. Did have dental procedure ~3 weeks ago.     ROS: otherwise negative      T(C): 36.8 (25 @ 07:40), Max: 39 (25 @ 06:26)  HR: 64 (25 @ 06:26) (50 - 64)  BP: 156/66 (25 @ 06:26) (137/61 - 156/66)  RR: 20 (25 @ 06:26) (16 - 20)  SpO2: 97% (25 @ 06:26) (95% - 97%)  Wt(kg): --Vital Signs Last 24 Hrs  T(C): 36.8 (2025 07:40), Max: 39 (2025 06:26)  T(F): 98.2 (2025 07:40), Max: 102.2 (2025 06:26)  HR: 64 (2025 06:26) (50 - 64)  BP: 156/66 (2025 06:26) (137/61 - 156/66)  BP(mean): 95 (2025 06:26) (88 - 95)  RR: 20 (2025 06:26) (16 - 20)  SpO2: 97% (2025 06:26) (95% - 97%)    Parameters below as of 2025 06:26  Patient On (Oxygen Delivery Method): room air        PHYSICAL EXAM:  Constitutional: resting comfortably in bed; NAD but generally appears fatigued  Head: NC/AT  Eyes: PERRL, EOMI, anicteric sclera  ENT: no nasal discharge; MMM  Neck: supple; no JVD or thyromegaly  Respiratory: CTA B/L; no W/R/R, no retractions  Cardiac: +S1/S2; RRR; no M/R/G  Gastrointestinal: soft, NT/ND; no rebound or guarding; +BSx4  Extremities: WWP, no clubbing or cyanosis; no peripheral edema. Capillary refill <2 sec  Musculoskeletal: NROM x4; no joint swelling, tenderness or erythema  Vascular: 2+ radial, DP/PT pulses B/L  Dermatologic: skin warm, dry and intact; no rashes, wounds, or scars  Neurologic: AAOx3; CNII-XII grossly intact; no focal deficits  Psychiatric: affect and characteristics of appearance, verbalizations, behaviors are appropriate    LABS:                        10.1   23.47 )-----------( 270      ( 2025 02:50 )             28.8         139  |  106  |  36[H]  ----------------------------<  161[H]  3.4[L]   |  24  |  1.40[H]    Ca    7.7[L]      2025 02:50  Mg     2.2     -    TPro  5.3[L]  /  Alb  2.2[L]  /  TBili  0.4  /  DBili  x   /  AST  58[H]  /  ALT  37  /  AlkPhos  130[H]  04-24      PT/INR - ( 2025 02:50 )   PT: 22.0 sec;   INR: 1.93          PTT - ( 2025 02:50 )  PTT:30.1 sec  Urinalysis Basic - ( 2025 03:07 )    Color: Yellow / Appearance: Clear / S.017 / pH: x  Gluc: x / Ketone: Negative mg/dL  / Bili: Negative / Urobili: 0.2 mg/dL   Blood: x / Protein: 30 mg/dL / Nitrite: Negative   Leuk Esterase: Small / RBC: 380 /HPF / WBC 26 /HPF   Sq Epi: x / Non Sq Epi: 2 /HPF / Bacteria: Negative /HPF      CAPILLARY BLOOD GLUCOSE            Urinalysis Basic - ( 2025 03:07 )    Color: Yellow / Appearance: Clear / S.017 / pH: x  Gluc: x / Ketone: Negative mg/dL  / Bili: Negative / Urobili: 0.2 mg/dL   Blood: x / Protein: 30 mg/dL / Nitrite: Negative   Leuk Esterase: Small / RBC: 380 /HPF / WBC 26 /HPF   Sq Epi: x / Non Sq Epi: 2 /HPF / Bacteria: Negative /HPF        MEDICATIONS  (STANDING):  ceFAZolin   IVPB 2000 milliGRAM(s) IV Intermittent every 8 hours  ceFAZolin   IVPB      heparin  Infusion.  Unit(s)/Hr (13 mL/Hr) IV Continuous <Continuous>  losartan 50 milliGRAM(s) Oral daily  pantoprazole    Tablet 40 milliGRAM(s) Oral before breakfast  potassium chloride    Tablet ER 20 milliEquivalent(s) Oral once  senna 2 Tablet(s) Oral at bedtime    MEDICATIONS  (PRN):  ALPRAZolam 0.25 milliGRAM(s) Oral every 8 hours PRN anxiety  heparin   Injectable 6000 Unit(s) IV Push every 6 hours PRN For aPTT less than 40  heparin   Injectable 3000 Unit(s) IV Push every 6 hours PRN For aPTT between 40 - 57  polyethylene glycol 3350 17 Gram(s) Oral two times a day PRN Constipation      RADIOLOGY & ADDITIONAL TESTS: Reviewed   OVERNIGHT EVENTS: Febrile to 101.2F at 6am.     SUBJECTIVE: Patient seen and examined. Reports feeling overall fatigued. Denies chest pain, nausea/vomiting, abdominal pain. No recent falls/injuries, no recent travel. Did have dental procedure ~3 weeks ago.     ROS: otherwise negative      T(C): 36.8 (25 @ 07:40), Max: 39 (-25 @ 06:26)  HR: 64 (25 @ 06:26) (50 - 64)  BP: 156/66 (25 @ 06:26) (137/61 - 156/66)  RR: 20 (25 @ 06:26) (16 - 20)  SpO2: 97% (25 @ 06:26) (95% - 97%)  Wt(kg): --Vital Signs Last 24 Hrs  T(C): 36.8 (2025 07:40), Max: 39 (2025 06:26)  T(F): 98.2 (2025 07:40), Max: 102.2 (2025 06:26)  HR: 64 (2025 06:26) (50 - 64)  BP: 156/66 (2025 06:26) (137/61 - 156/66)  BP(mean): 95 (2025 06:26) (88 - 95)  RR: 20 (2025 06:26) (16 - 20)  SpO2: 97% (2025 06:26) (95% - 97%)    Parameters below as of 2025 06:26  Patient On (Oxygen Delivery Method): room air        PHYSICAL EXAM:  Constitutional: resting comfortably in bed; NAD but generally appears fatigued  Head: NC/AT  Eyes: PERRL, EOMI, anicteric sclera  ENT: no nasal discharge; MMM  Neck: supple; no JVD or thyromegaly  Respiratory: CTA B/L; no W/R/R, no retractions  Cardiac: +S1/S2; RRR; systolic murmur  Gastrointestinal: soft, NT/ND; no rebound or guarding; +BSx4  Extremities: WWP, no clubbing or cyanosis; no peripheral edema. Capillary refill <2 sec  Musculoskeletal: NROM x4; no joint swelling, tenderness or erythema  Vascular: 2+ radial, DP/PT pulses B/L  Dermatologic: skin warm, dry and intact; no rashes, wounds, or scars  Neurologic: AAOx3; CNII-XII grossly intact; no focal deficits  Psychiatric: affect and characteristics of appearance, verbalizations, behaviors are appropriate    LABS:                        10.1   23.47 )-----------( 270      ( 2025 02:50 )             28.8         139  |  106  |  36[H]  ----------------------------<  161[H]  3.4[L]   |  24  |  1.40[H]    Ca    7.7[L]      2025 02:50  Mg     2.2     -    TPro  5.3[L]  /  Alb  2.2[L]  /  TBili  0.4  /  DBili  x   /  AST  58[H]  /  ALT  37  /  AlkPhos  130[H]  04-24      PT/INR - ( 2025 02:50 )   PT: 22.0 sec;   INR: 1.93          PTT - ( 2025 02:50 )  PTT:30.1 sec  Urinalysis Basic - ( 2025 03:07 )    Color: Yellow / Appearance: Clear / S.017 / pH: x  Gluc: x / Ketone: Negative mg/dL  / Bili: Negative / Urobili: 0.2 mg/dL   Blood: x / Protein: 30 mg/dL / Nitrite: Negative   Leuk Esterase: Small / RBC: 380 /HPF / WBC 26 /HPF   Sq Epi: x / Non Sq Epi: 2 /HPF / Bacteria: Negative /HPF      CAPILLARY BLOOD GLUCOSE            Urinalysis Basic - ( 2025 03:07 )    Color: Yellow / Appearance: Clear / S.017 / pH: x  Gluc: x / Ketone: Negative mg/dL  / Bili: Negative / Urobili: 0.2 mg/dL   Blood: x / Protein: 30 mg/dL / Nitrite: Negative   Leuk Esterase: Small / RBC: 380 /HPF / WBC 26 /HPF   Sq Epi: x / Non Sq Epi: 2 /HPF / Bacteria: Negative /HPF        MEDICATIONS  (STANDING):  ceFAZolin   IVPB 2000 milliGRAM(s) IV Intermittent every 8 hours  ceFAZolin   IVPB      heparin  Infusion.  Unit(s)/Hr (13 mL/Hr) IV Continuous <Continuous>  losartan 50 milliGRAM(s) Oral daily  pantoprazole    Tablet 40 milliGRAM(s) Oral before breakfast  potassium chloride    Tablet ER 20 milliEquivalent(s) Oral once  senna 2 Tablet(s) Oral at bedtime    MEDICATIONS  (PRN):  ALPRAZolam 0.25 milliGRAM(s) Oral every 8 hours PRN anxiety  heparin   Injectable 6000 Unit(s) IV Push every 6 hours PRN For aPTT less than 40  heparin   Injectable 3000 Unit(s) IV Push every 6 hours PRN For aPTT between 40 - 57  polyethylene glycol 3350 17 Gram(s) Oral two times a day PRN Constipation      RADIOLOGY & ADDITIONAL TESTS: Reviewed

## 2025-04-24 NOTE — H&P ADULT - HISTORY OF PRESENT ILLNESS
63yo M w/ PMHx of HTN, HLD, HCM (follows w/ Dr. Sapp), pAFib (on Eliquis, last dose 4/23 PM), hx of CHB (s/p PPM 1/2025) who initially presented to [facility] with fevers, body aches, fatigue. Pt found to be septic and w/ BCx/UCx (+) for MSSA. Of note, patient had root canal 2wks prior to presentation. Patient was started on Cefazolin. Per verbal report to RN (documentation was not transferred with the patient), TTE was concerning for aortic valve vegitation, but subsequent DONNY was negative. At this time concern that infection source is patient's PPM. On arrival to [facility], pt reports feeling "run down", but no CP, palpitations, dizziness, abdominal pain, N/V, fever/chills, orthopnea. Plan for EP consult in morning to discuss suspected PPM infection (patient known to EP service).

## 2025-04-24 NOTE — H&P ADULT - PROBLEM SELECTOR PLAN 1
- Presented [facility] w/ fever/chills/fatigue, found to be septic w/ BCx/UCx (+) for MSSA.   - Per verbal report (documentation not transferred with patient), TTE was concerning for aortic valve vegetation, but DONNY was negative for vegetation.   - 2wks prior to presentation pt had root canal.   - Concern that PPM could be source of infection.   - CONT: Cefazolin 2g IV q8hrs.   - PLAN: EP consult in AM (known to Dr. Rivero). ID consult in AM. BCx/UCx and TTE/DONNY were requested to be faxed (not sent with patient during transfer).

## 2025-04-24 NOTE — H&P ADULT - NS ATTEND AMEND GEN_ALL_CORE FT
Mr. Kennedy is a 64M with history of HCM, HTN, paroxysmal atrial fibrillation (Eliquis, last dose 4/23), recent CHB with PPM dual chamber in Jan 2025 presented initially to [facility] with fevers, body aches and admitted for sesis with MSSA bacteremia. Persistent fevers and did not clear cultures, concern for need for PPM device removal.     Exam:   NAD  JVP normal  Lungs CTA  2/6 systolic murmur  WWP, no edema      MSSA bacteremia- ID consulted, switch to Nafcillin from Cefazolin. Send cultures, ID recommending Device removal and Gallium scan. Repeat TTE  PPM- Need for device removal in setting of unable to clear cultures. EP consulted plan for extraction tomorrow, micra for temporary pacing while awaiting safety of replacement  HCM- Follows with HF Sekou, repeat TTE, avoid hypovolemia

## 2025-04-24 NOTE — CHART NOTE - NSCHARTNOTEFT_GEN_A_CORE
Records obtained from [facility]:    CXR 4/17/25  A permanent pacemaker is noted overlaying and obscuring the left upper chest abd axilla with intact atrial and ventricular wire leads.    The lungs are clear. There is no evidence for pleural effusion. There is no evidence for pneumothorax.     The cardiac size is magnified. The mediastinum and hilar soft tissues are unremarkable.     The bony thorax is unremarkable.     IMPRESSION:  No evidence of acute pulmonary disease.     CT Abdomen Pelvis IV contrast 4/17/25  LUNG BASES:  The visualized portions of the lung bases, heart and pericardium are grossly unremarkable. Nodules: No suspicious pulmonary nodules in the visualized portions of the lungs.     LIVER:   The liver is normal in size and contour. Ill-defined heterogenous mildly hypodense region in segment 6 of the liver, possibly representing focal fatty infiltration mass not entirely excluded. Nonemergent outpatient contrast-enhanced MRI recommended for further evaluation.    BILIARY SYSTEM:  There is no biliary ductal dilatation. Normal gallbladder.    SPLEEN/PANCREAS:  The spleen is grossly unremarkable. The pancreas is grossly unremarkable.     ADRENALS:   The adrenals are unremarkable.     KIDNEYS:  The kidneys are unremarkable.    VASCULATURE:  The abdominal aorta is normal in course and caliber.    BOWEL:  There is no evidence for small bowel obstruction. There is no evidence for free intraperitoneal air. The colon is normal in caliber.     LYMPH NODES/FLUID:  There is no suspicious lymphadenopathy. There is no abdominal ascites.     BODY WALL: No body wall mass or hernia identified.    BLADDER/GENITALS:  The urinary bladder is grossly unremarkable.    BONES:  The bones are grossly unremarkable.    IMPRESSION:  No acute findings in the abdomen or pelvis.    Ill-defined heterogenous mildly hypodense region in segment 6 of the liver, possibly representing focal fatty infiltration mass not entirely excluded. Nonemergent outpatient contrast-enhanced MRI recommended for further evaluation.    Transthoracic Echo Report 4/18/25  CONCLUSIONS:  No prior study available for comparison.  Normal left and right ventricular size and systolic function.  No regional wall motion abnormalities.  There is mildly increased septal notch thickening noted with evidence of mildly increased gradient across the left ventricular outflow tract.  Pacemaker/ICD/catheter wire noted in the right ventricular cavity.  The left atrium is moderately dilated.  A mass is present on the aortic valve, consistent with a vegetation.  There is no pericardial effusion.    Transesophageal Echo with Bubble Study Report 4/22/25  CONCLUSIONS:  Normal left and right ventricular size and systolic function with no obvious regional wall motion abnormalities.  Mild asymmetric basal septal hypertrophy is observed.  Systolic anterior motion of mitral leaflet.  Pacemaker/ICD/catheter wire noted in the right ventricular cavity.  The left atrium is moderately dilated.  No thrombus detected in the left atrial appendage.  No patent foramen ovale demonstrated by agitated saline injection.  No mobile vegetations seen on the valves or leads.  There is no pericardial effusion.    Blood cultures (collected 4/17/2025 13:38, 4/20/2025 6:39)  Staphylococcus aureus from aerobic bottle    **Susceptibility results**  Clindamycin -- resistant  Daptomycin -- CHUYITA 0.25  Doxycycline -- CHUYITA <= 0.5 -- Susceptible  Erythromycin -- CHUYITA >=8 -- Resistant  Linezolid -- CHUYITA 2 -- Susceptible  Oxacillin -- CHUYITA 0.5 -- Susceptible  Rifampicin -- CHUYITA <=0.5 -- Susceptible  Tetracycline -- CHUYITA <=1 -- Susceptible  Trimethoprim/Sulfa -- <=10 -- Susceptible  Vancomycin -- CHUYITA <=0.5 -- Susceptible    Urine cultures (collected 4/17/25 16:27)  20,000 cfu/nl Staphylococcus aureus    **Susceptibility results**  Clindamycin -- resistant  Daptomycin -- CHUYITA 0.25  Doxycycline -- CHUYITA <= 0.5 -- Susceptible  Erythromycin -- CHUYITA >=8 -- Resistant  Linezolid -- CHUYITA 2 -- Susceptible  Nitrofurantoin -- CHUYITA <=16 -- Susceptible  Oxacillin -- CHUYITA 0.5 -- Susceptible  Rifampicin -- CHUYITA <=0.5 -- Susceptible  Tetracycline -- CHUYITA <=1 -- Susceptible  Trimethoprim/Sulfa -- <=10 -- Susceptible  Vancomycin -- CHUYITA 1 -- Susceptible    Copy of complete records placed in patient's chart. Records obtained from [facility]:    CXR 4/17/25  A permanent pacemaker is noted overlaying and obscuring the left upper chest and axilla with intact atrial and ventricular wire leads.    The lungs are clear. There is no evidence for pleural effusion. There is no evidence for pneumothorax.     The cardiac size is magnified. The mediastinum and hilar soft tissues are unremarkable.     The bony thorax is unremarkable.     IMPRESSION:  No evidence of acute pulmonary disease.     CT Abdomen Pelvis IV contrast 4/17/25  LUNG BASES:  The visualized portions of the lung bases, heart and pericardium are grossly unremarkable. Nodules: No suspicious pulmonary nodules in the visualized portions of the lungs.     LIVER:   The liver is normal in size and contour. Ill-defined heterogenous mildly hypodense region in segment 6 of the liver, possibly representing focal fatty infiltration mass not entirely excluded. Nonemergent outpatient contrast-enhanced MRI recommended for further evaluation.    BILIARY SYSTEM:  There is no biliary ductal dilatation. Normal gallbladder.    SPLEEN/PANCREAS:  The spleen is grossly unremarkable. The pancreas is grossly unremarkable.     ADRENALS:   The adrenals are unremarkable.     KIDNEYS:  The kidneys are unremarkable.    VASCULATURE:  The abdominal aorta is normal in course and caliber.    BOWEL:  There is no evidence for small bowel obstruction. There is no evidence for free intraperitoneal air. The colon is normal in caliber.     LYMPH NODES/FLUID:  There is no suspicious lymphadenopathy. There is no abdominal ascites.     BODY WALL: No body wall mass or hernia identified.    BLADDER/GENITALS:  The urinary bladder is grossly unremarkable.    BONES:  The bones are grossly unremarkable.    IMPRESSION:  No acute findings in the abdomen or pelvis.    Ill-defined heterogenous mildly hypodense region in segment 6 of the liver, possibly representing focal fatty infiltration mass not entirely excluded. Nonemergent outpatient contrast-enhanced MRI recommended for further evaluation.    Transthoracic Echo Report 4/18/25  CONCLUSIONS:  No prior study available for comparison.  Normal left and right ventricular size and systolic function.  No regional wall motion abnormalities.  There is mildly increased septal notch thickening noted with evidence of mildly increased gradient across the left ventricular outflow tract.  Pacemaker/ICD/catheter wire noted in the right ventricular cavity.  The left atrium is moderately dilated.  A mass is present on the aortic valve, consistent with a vegetation.  There is no pericardial effusion.    Transesophageal Echo with Bubble Study Report 4/22/25  CONCLUSIONS:  Normal left and right ventricular size and systolic function with no obvious regional wall motion abnormalities.  Mild asymmetric basal septal hypertrophy is observed.  Systolic anterior motion of mitral leaflet.  Pacemaker/ICD/catheter wire noted in the right ventricular cavity.  The left atrium is moderately dilated.  No thrombus detected in the left atrial appendage.  No patent foramen ovale demonstrated by agitated saline injection.  No mobile vegetations seen on the valves or leads.  There is no pericardial effusion.    Blood cultures (collected 4/17/2025 13:38, 4/20/2025 6:39)  Staphylococcus aureus from aerobic bottle    **Susceptibility results**  Clindamycin -- resistant  Daptomycin -- CHUYITA 0.25  Doxycycline -- CHUYITA <= 0.5 -- Susceptible  Erythromycin -- CHUYITA >=8 -- Resistant  Linezolid -- CHUYITA 2 -- Susceptible  Oxacillin -- CHUYITA 0.5 -- Susceptible  Rifampicin -- CHUYITA <=0.5 -- Susceptible  Tetracycline -- CHUYITA <=1 -- Susceptible  Trimethoprim/Sulfa -- <=10 -- Susceptible  Vancomycin -- CHUYITA <=0.5 -- Susceptible    Urine cultures (collected 4/17/25 16:27)  20,000 cfu/nl Staphylococcus aureus    **Susceptibility results**  Clindamycin -- resistant  Daptomycin -- CHUYITA 0.25  Doxycycline -- CHUYITA <= 0.5 -- Susceptible  Erythromycin -- CHUYITA >=8 -- Resistant  Linezolid -- CHUYITA 2 -- Susceptible  Nitrofurantoin -- CHUYITA <=16 -- Susceptible  Oxacillin -- CHUYITA 0.5 -- Susceptible  Rifampicin -- CHUYITA <=0.5 -- Susceptible  Tetracycline -- CHUYITA <=1 -- Susceptible  Trimethoprim/Sulfa -- <=10 -- Susceptible  Vancomycin -- CHUYITA 1 -- Susceptible    Copy of complete records placed in patient's chart.

## 2025-04-24 NOTE — PATIENT PROFILE ADULT - FALL HARM RISK - UNIVERSAL INTERVENTIONS
Bed in lowest position, wheels locked, appropriate side rails in place/Call bell, personal items and telephone in reach/Instruct patient to call for assistance before getting out of bed or chair/Non-slip footwear when patient is out of bed/[place] to call system/Physically safe environment - no spills, clutter or unnecessary equipment/Purposeful Proactive Rounding/Room/bathroom lighting operational, light cord in reach

## 2025-04-24 NOTE — H&P ADULT - PROBLEM SELECTOR PLAN 5
- Pt w/ Hx of AFib (was noted on PPM that was placed recently).   - CONT: Heparin gtt  - Holding Eliquis in anticipation for possible procedure (last dose 4/23/25 PM).       DVT ppx: Heparin gtt  Dispo: EP consult in AM

## 2025-04-24 NOTE — PATIENT PROFILE ADULT - DO YOU FEEL UNSAFE AT HOME, WORK, OR SCHOOL?
"ED Provider Note    Scribed for Davi Contreras M.D. by Estrella Melendez. 3/21/2020, 7:02 PM.    Primary care provider: Cayla Yang M.D.  Means of arrival: [place]  History obtained from: RN  History limited by: None    CHIEF COMPLAINT  Chief Complaint   Patient presents with   • Unresponsive     PT JUST DISCHARGED. AN EMPLOYEE FOUND PT UNRESONSIVE WITH SNORING RESPIRATIONS. PT IS NOW AWAKE AND REPORTS SHE GOT TIRED ON THE WAY TO THE CAR AND SHE SAT DOWN. DENIES PAIN.        HPI  Estefany Black is a 70 y.o. female who presents to the Emergency Department via ambulance. Per RN, the patient was recently discharged from the ED for vaginal bleeding and was found unresponsive with snoring respiration by an employee just a few minutes ago. At bedside, the patient reports she was walking outside of the hospital and developed acute lightheadedness and \"got tired\" while getting into the car prior to syncopal episode. Denies any musculoskeletal pain. No alleviating or exacerbating factors identified. She is was taking Warfarin for history of pulmonary embolism, however her PCP discontinued the Warfarin due to the recent vaginal bleed. No cough or fever.       REVIEW OF SYSTEMS  Pertinent positives include syncope and lightheadedness. Pertinent negatives include musculoskeletal pain, cough or fever. All other systems negative.    PAST MEDICAL HISTORY   has a past medical history of (HFpEF) heart failure with preserved ejection fraction (HCC) (2/12/2019), Breath shortness (02/11/2019), Chickenpox, Chronic pain, Congestive heart failure (CHF) (Formerly [facility]), Dental disorder, Egyptian measles, Left ventricular diastolic dysfunction, NYHA class 3 (2/12/2019), Mumps, Osteoporosis, Pulmonary embolism (HCC), Sickle cell anemia (HCC), and Sickle cell anemia (HCC).    SURGICAL HISTORY   has a past surgical history that includes cholecystectomy (1981); gyn surgery (1983); femur orif (6/29/2014); tubal ligation; other; and " other.    SOCIAL HISTORY  Social History     Tobacco Use   • Smoking status: Never Smoker   • Smokeless tobacco: Never Used   Substance Use Topics   • Alcohol use: Yes     Frequency: Monthly or less   • Drug use: No      Social History     Substance and Sexual Activity   Drug Use No       FAMILY HISTORY  Family History   Problem Relation Age of Onset   • Diabetes Mother    • Stroke Mother    • Cancer Father         esophageal       CURRENT MEDICATIONS  Home Medications     Reviewed by Alison Hernandez (Pharmacy Tech) on 03/21/20 at 2114  Med List Status: Complete   Medication Last Dose Status   ascorbic acid (VITAMIN C) 500 MG tablet 3/20/2020 Active   Cholecalciferol (VITAMIN D3) 5000 UNITS Cap 3/20/2020 Active   ferrous sulfate 325 (65 FE) MG tablet 3/20/2020 Active   folic acid (FOLVITE) 1 MG Tab 3/20/2020 Active   hydroxyurea (HYDREA) 500 MG Cap 3/20/2020 Active   warfarin (COUMADIN) 5 MG Tab 3/18/2020 Active                ALLERGIES  No Known Allergies    PHYSICAL EXAM  VITAL SIGNS: /62   Pulse 91   Temp 36.4 °C (97.5 °F) (Oral)   Resp 18   SpO2 92%     Constitutional: Well developed, Well nourished, Mild distress.   HENT: Normocephalic, Atraumatic, Oropharynx moist.   Eyes: 2 mm and reactive, Conjunctiva normal, No discharge.   Cardiovascular: Tachycardic heart rate, Normal rhythm, No murmurs, equal pulses.   Pulmonary: Normal breath sounds, No respiratory distress, No wheezing, No rales, No rhonchi.  Chest: No chest wall tenderness or deformity.   Abdomen:Soft, No tenderness, No masses, no rebound, no guarding.   Musculoskeletal: No major deformities noted, No tenderness.   Skin: Warm, Dry, No erythema, No rash.   Neurologic: Extremely somnolent with minimal response to painful stimulus but moving both sides    LABS  Results for orders placed or performed during the hospital encounter of 03/21/20   CBC WITH DIFFERENTIAL   Result Value Ref Range    WBC 12.4 (H) 4.8 - 10.8 K/uL    RBC 2.79 (L)  4.20 - 5.40 M/uL    Hemoglobin 10.9 (L) 12.0 - 16.0 g/dL    Hematocrit 31.3 (L) 37.0 - 47.0 %    .2 (H) 81.4 - 97.8 fL    MCH 39.1 (H) 27.0 - 33.0 pg    MCHC 34.8 33.6 - 35.0 g/dL    RDW 79.7 (H) 35.9 - 50.0 fL    Platelet Count 205 164 - 446 K/uL    MPV 11.7 9.0 - 12.9 fL    Neutrophils-Polys 71.40 44.00 - 72.00 %    Lymphocytes 20.30 (L) 22.00 - 41.00 %    Monocytes 7.60 0.00 - 13.40 %    Eosinophils 0.00 0.00 - 6.90 %    Basophils 0.30 0.00 - 1.80 %    Immature Granulocytes 0.40 0.00 - 0.90 %    Nucleated RBC 0.90 /100 WBC    Neutrophils (Absolute) 8.86 (H) 2.00 - 7.15 K/uL    Lymphs (Absolute) 2.52 1.00 - 4.80 K/uL    Monos (Absolute) 0.95 (H) 0.00 - 0.85 K/uL    Eos (Absolute) 0.00 0.00 - 0.51 K/uL    Baso (Absolute) 0.04 0.00 - 0.12 K/uL    Immature Granulocytes (abs) 0.05 0.00 - 0.11 K/uL    NRBC (Absolute) 0.11 K/uL   COD - Adult (Type and Screen)   Result Value Ref Range    ABO Grouping Only O     Rh Grouping Only POS     Antibody Screen-Cod NEG    Basic Metabolic Panel (BMP)   Result Value Ref Range    Sodium 142 135 - 145 mmol/L    Potassium 3.8 3.6 - 5.5 mmol/L    Chloride 110 96 - 112 mmol/L    Co2 17 (L) 20 - 33 mmol/L    Glucose 113 (H) 65 - 99 mg/dL    Bun 6 (L) 8 - 22 mg/dL    Creatinine 0.80 0.50 - 1.40 mg/dL    Calcium 9.3 8.5 - 10.5 mg/dL    Anion Gap 15.0 7.0 - 16.0   Troponin STAT   Result Value Ref Range    Troponin T 110 (H) 6 - 19 ng/L   DIAGNOSTIC ALCOHOL   Result Value Ref Range    Diagnostic Alcohol <10.1 0.0 - 10.1 mg/dL   ESTIMATED GFR   Result Value Ref Range    GFR If African American >60 >60 mL/min/1.73 m 2    GFR If Non African American >60 >60 mL/min/1.73 m 2   EKG   Result Value Ref Range    Report       [facility] Emergency Dept.    Test Date:  2020  Pt Name:    EFRA HEAD                  Department: ER  MRN:        [id number]                      Room:       [facility]  Gender:     Female                       Technician: [id number]  :        1949                    Requested By:ANTONIO PORTER  Order #:    [id number]                    Reading MD: ANTONIO PORTER MD    Measurements  Intervals                                Axis  Rate:       95                           P:          59  ND:         212                          QRS:        68  QRSD:       114                          T:          135  QT:         380  QTc:        478    Interpretive Statements  SINUS RHYTHM  BORDERLINE AV CONDUCTION DELAY  PROBABLE LEFT ATRIAL ABNORMALITY  INCOMPLETE RIGHT BUNDLE BRANCH BLOCK  Compared to ECG 10/08/2019 13:33:03  Left ventricular hypertrophy no longer present  Electronically Signed On 3- 19:13:16 PDT by ANTONIO PORTER MD     EKG (NOW)   Result Value Ref Range    Report       [facility] Emergency Dept.    Test Date:  2020  Pt Name:    EFRA LYONS                  Department: ER  MRN:        [id number]                      Room:       [facility]  Gender:     Female                       Technician: [id number]  :        1949                   Requested By:ANTONIO PORTER  Order #:    [id number]                    Reading MD: ANTONIO PORTER MD    Measurements  Intervals                                Axis  Rate:       87                           P:          39  ND:         236                          QRS:        45  QRSD:       124                          T:          -12  QT:         392  QTc:        472    Interpretive Statements  SINUS RHYTHM  FIRST DEGREE AV BLOCK  LEFT ATRIAL ABNORMALITY  RIGHT BUNDLE BRANCH BLOCK  Compared to ECG 2020 19:03:23  First degree AV block now present  Right bundle-branch block now present  Incomplete right bundle-branch block no longer present  Electronically Signed On 3- 20:22:2 3 PDT by ANTONIO PORTER MD        All labs reviewed by me.    EKG  12 Lead EKG interpreted by me as shown above.    RADIOLOGY  CT-CTA CHEST PULMONARY ARTERY W/ RECONS   Final  "Result      No central or segmental pulmonary embolus is identified.      Prominent cardiomegaly.      Atherosclerotic plaque.      Bibasilar opacities may represent atelectasis or pneumonitis. Mild lingula and right middle lobe atelectasis is seen.      Prominence of the main pulmonary artery can be seen in pulmonary arterial hypertension.      Thyroid nodules for which further evaluation with thyroid ultrasound is recommended.      Healing fracture of the left eighth rib.                  CT-HEAD W/O   Final Result      No acute intracranial abnormality is identified.      There are periventricular and subcortical white matter changes present.  This finding is nonspecific and could be from previous small vessel ischemia, demyelination, or gliosis.      Atrophy      Multiple calvarial lytic lesions can be seen in multiple myeloma or metastatic disease.        The radiologist's interpretation of all radiological studies have been reviewed by me.    COURSE & MEDICAL DECISION MAKING  Pertinent Labs & Imaging studies reviewed. (See chart for details)    I verified that the patient was wearing a mask and I was wearing appropriate PPE every time I entered the room. The patient's mask was on the patient at all times during my encounter except for a brief view of the oropharynx.     Obtained and reviewed past medical records from today which indicated CBC shows low INR and the patient has history of pulmonary embolism.      6:56 PM - Patient seen and examined at bedside. Per RN, the patient was discharged from the ED today for vaginal bleeding and was found unresponsive by an employee just a few minutes ago. At bedside, the patient reports she \"got tired\" and developed acute lightheadedness just prior to syncopal episode. The patient will receive IV fluids for syncope. Ordered EKG CT head, CT CTA chest pulmonary artery with recons, CBC with diff, Troponin STAT, BMP, COD Adult, Diagnostic alcohol, and Urine Drug screen to " evaluate her symptoms. The differential diagnoses include but are not limited to: Drug overdose, MI, intercranial hemorrhage, vasovagal episode    7:47 PM - Review of lab results show troponin is elevated at 110. The patient was not given aspirin due to recent vaginal bleeding and hemoglobin had dropped to 2 g compared to previous labs.    8:22 PM Paged Hospitalist.    8:27 PM  I discussed the patient's case and the above findings with Dr. Paul (Hospitalist) who will evaluate the patient for hospitalization.       8:52 PM I discussed the patient's case and the above findings with Dr. Waite (Cardiology) who agrees to consult the patient.      There was a improved response to IV fluids after patient reevaluation.    HYDRATION: Based on the patient's presentation of Other syncope the patient was given IV fluids. IV Hydration was used because oral hydration was not adequate alone. Upon recheck following hydration, the patient was mildly improved.       Medical Decision Making: Patient presents with being found down.  She had just recently been discharged from the hospital after being evaluated for a vaginal bleed.  She is found down unresponsive just outside the hospital.  Patient was brought in she has minimally responsive.  She did have a palpable pulse.  Pulse ox was 90%.  Patient did not seem to have unilateral weakness.  Patient became responsive on her own.  She did not require Narcan.  At this point time it is unclear if the patient had arrhythmia or myocardial infarction.  She does have an elevation in her troponin.  EKG did not show signs of ST elevation MI.  CT of the head was done to rule out intracranial hemorrhage which was negative.  Patient does have what looks like lytic lesions.  Patient is being evaluated for possible uterine cancer.  Patient did drop her hemoglobin 2 g it is possible that this acute drop caused her to have her syncope.  At this point time I think should benefit from admission to the  hospital with careful cardiac evaluation.  Given the fact the patient was having significant vaginal bleeding earlier in the day I did not feel that she was a candidate for aspirin or heparin as this was likely make her bleeding worse.    DISPOSITION:  Patient will be hospitalized by Dr. Paul in guarded condition.       FINAL IMPRESSION  1. Syncope, unspecified syncope type    2. Elevated troponin    3. Altered mental status, unspecified altered mental status type          I, Estrella Melendez (Scribe), am scribing for, and in the presence of, Davi Contreras M.D.    Electronically signed by: Estrella Melendez (Scribe), 3/21/2020    IDavi M.D. personally performed the services described in this documentation, as scribed by Estrella Melendez in my presence, and it is both accurate and complete. C    The note accurately reflects work and decisions made by me.  Davi Contreras M.D.  3/21/2020  10:13 PM     no

## 2025-04-24 NOTE — CONSULT NOTE ADULT - SUBJECTIVE AND OBJECTIVE BOX
INFECTIOUS DISEASES INITIAL CONSULT NOTE    HPI:    63yo M w/  HTN, HLD, HCM (follows w/ Dr. Sapp), pAFib (on Eliquis, last dose  , cervical neck surgery (?fusion) 30 years ago, hx of CHB (s/p PPM 2025) who initially presented to [facility] with fevers, body aches, fatigue found to have MSSA BSI transferred to [facility] for further management. ID consulted for MSSA BSI.  Patient states that he initially presented to his PCP about a week ago for 4 days of fevers, fatigue, weakness. His PCP referred him to ED so he presented to [facility] where he was found to have MSSA bacteremia. Was started on IV cefazolin. Reportedly had TTE c/f aortic valve vegetation, negative DONNY, CT AP without acute findings, urine culture positive for MSSA (likely 2/2 hematogenous spread) and persistent bacteremia. Patient notes he had a root canal a couple weeks ago- denies dental pain. Reports some pain in neck that started today. Denies other back or joint pains. On arrival to [facility], febrile 102 with leukocytosis 23. Labs notable for Cr 1.40. Blood culture x 1 sent and in lab. Switched to nafcillin 2g IV Q4 per our preliminary recs. Patient states he is only feeling slightly better since being hospitalized - still very weak and fatigued.       PAST MEDICAL & SURGICAL HISTORY:        Review of Systems:   Constitutional, eyes, ENT, cardiovascular, respiratory, gastrointestinal, genitourinary, integumentary, neurological, psychiatric and heme/lymph are otherwise negative other than noted above       ANTIBIOTICS:  MEDICATIONS  (STANDING):  heparin  Infusion.  Unit(s)/Hr (13 mL/Hr) IV Continuous <Continuous>  losartan 50 milliGRAM(s) Oral daily  nafcillin  IVPB      nafcillin  IVPB 2 Gram(s) IV Intermittent every 4 hours  pantoprazole    Tablet 40 milliGRAM(s) Oral before breakfast  senna 2 Tablet(s) Oral at bedtime    MEDICATIONS  (PRN):  ALPRAZolam 0.25 milliGRAM(s) Oral every 8 hours PRN anxiety  heparin   Injectable 6000 Unit(s) IV Push every 6 hours PRN For aPTT less than 40  heparin   Injectable 3000 Unit(s) IV Push every 6 hours PRN For aPTT between 40 - 57  polyethylene glycol 3350 17 Gram(s) Oral two times a day PRN Constipation      Allergies    No Known Allergies    Intolerances        SOCIAL HISTORY:  -lives in [place], [place] with wife   -works as    -denies tobacco or recreational drug use   -social etoh     FAMILY HISTORY:   no FH leading to current infection    Vital Signs Last 24 Hrs  T(C): 36.9 (2025 12:42), Max: 39 (2025 06:26)  T(F): 98.5 (2025 12:42), Max: 102.2 (2025 06:26)  HR: 50 (2025 12:42) (50 - 64)  BP: 156/71 (2025 12:42) (135/61 - 156/71)  BP(mean): 102 (:42) (88 - 102)  RR: 19 (:42) (16 - 20)  SpO2: 97% (2025 12:42) (94% - 97%)    Parameters below as of 2025 12:42  Patient On (Oxygen Delivery Method): room air        25 @ 07:25 @ 07:00  --------------------------------------------------------  IN: 59 mL / OUT: 290 mL / NET: -231 mL    25 @ 07:01  25 @ 12:59  --------------------------------------------------------  IN: 0 mL / OUT: 300 mL / NET: -300 mL        PHYSICAL EXAM:  Constitutional: alert, NAD  Eyes: the sclera and conjunctiva were normal. PERRLA, no photophobia   ENT: the ears and nose were normal in appearance.   Neck: the appearance of the neck was normal and the neck was supple. pain with extension/flexion, lateral rotation to the left, no spinal tenderness of neck   Pulmonary: no respiratory distress and lungs were clear to auscultation bilaterally.   Heart: heart rate was normal and rhythm regular, normal S1 and S2. +systolic murmur (states has hx)  Vascular: +1 edema of b/l UE and LE (new since admission)  Abdomen: normal bowel sounds, soft, non-tender  Derm: no stigmata of endocarditis   Neurological: no focal deficits.   Psychiatric: the affect was normal  MSK: spinal tenderness of lumbar spine. No tenderness to palpation of shoulders, elbows, wrists, hips, knees, ankles       LABS:                        10.1   23.47 )-----------( 270      ( 2025 02:50 )             28.8     04-24    139  |  106  |  36[H]  ----------------------------<  161[H]  3.4[L]   |  24  |  1.40[H]    Ca    7.7[L]      2025 02:50  Mg     2.2     04-24    TPro  5.3[L]  /  Alb  2.2[L]  /  TBili  0.4  /  DBili  x   /  AST  58[H]  /  ALT  37  /  AlkPhos  130[H]  04-24    PT/INR - ( 2025 02:50 )   PT: 22.0 sec;   INR: 1.93          PTT - ( 2025 02:50 )  PTT:30.1 sec  Urinalysis Basic - ( 2025 03:07 )    Color: Yellow / Appearance: Clear / S.017 / pH: x  Gluc: x / Ketone: Negative mg/dL  / Bili: Negative / Urobili: 0.2 mg/dL   Blood: x / Protein: 30 mg/dL / Nitrite: Negative   Leuk Esterase: Small / RBC: 380 /HPF / WBC 26 /HPF   Sq Epi: x / Non Sq Epi: 2 /HPF / Bacteria: Negative /HPF        MICROBIOLOGY:    Urinalysis with Rflx Culture (collected 25 @ 03:07)        RADIOLOGY & ADDITIONAL STUDIES:

## 2025-04-24 NOTE — PROGRESS NOTE ADULT - PROBLEM SELECTOR PLAN 5
- Pt w/ Hx of AFib (was noted on PPM that was placed recently).   - CONT: Heparin gtt  - Holding Eliquis in anticipation for possible procedure (last dose 4/23/25 PM).       DVT ppx: Heparin gtt  Dispo: EP consult in AM - Pt w/ Hx of AFib (was noted on PPM that was placed recently).   - CONT: Heparin gtt  - Holding Eliquis in anticipation for possible procedure (last dose 4/23/25 PM).       DVT ppx: Heparin gtt  Dispo: pending blood cx clearance, source control

## 2025-04-24 NOTE — PROGRESS NOTE ADULT - PROBLEM SELECTOR PLAN 3
- Cr at transferring hospital was 1.34.   - f/u renal labs, continue to monitor. - Cr at transferring hospital was 1.34, 1.40 this morning.  - f/u renal labs, continue to monitor.

## 2025-04-24 NOTE — CONSULT NOTE ADULT - NS ATTEND AMEND GEN_ALL_CORE FT
Agree with above. Patient with MSSA bacteremia that is persistent. Has been out OSH for about a week.  DONNY reportedly negative.   However given presence of PPM and persistent bacteremia one should presume that the PPM is infected.  Agree with plans for device removal.  Can stop Cefazolin and start Nafcillin 2 grams IV q4hrs (preferred in high inoculum infections).  Check blood cultures daily.  Check NM gallium scan to evaluate for source of bacteremia and to evaluate if cervical spine hardware was infected with MSSA.  ID team 2 will follow

## 2025-04-24 NOTE — CONSULT NOTE ADULT - ASSESSMENT
63yo M w/  HTN, HLD, HCM (follows w/ Dr. Sapp), pAFib (on Eliquis, last dose 4/23 , cervical neck surgery (?fusion) 30 years ago, hx of CHB (s/p PPM 1/2025) who initially presented to [facility] with fevers, body aches, fatigue found to have MSSA BSI and suspected PPM infection transferred to [facility] for further management and removal of PPM. Patient febrile on arrival with leukocytosis 23. Complains of neck pain (has hardware) and has ttp of lumbar spine that is new - c/f seeding of infection. Per cardiology patient will have PPM removed tomorrow. Currently on nafcillin.     Suggest:  -f/u blood culture 4/24   -obtain daily blood cultures for now   -obtain OSH records (specifically micro data and imaging)   -f/u TTE   -f/u PPM removal. Please send cultures   -obtain gallium scan   -continue nafcillin 2g IV Q4h      Team 2 will follow you.    Case d/w primary team.  Final recommendation pending attending note.    Yue Noe, Infectious Diseases PA  Please reach out for any questions 9 am-5pm.   For evenings and weekends, please call the ID physician on call.

## 2025-04-25 LAB
ANION GAP SERPL CALC-SCNC: 11 MMOL/L — SIGNIFICANT CHANGE UP (ref 5–17)
APTT BLD: 68.6 SEC — HIGH (ref 26.1–36.8)
APTT BLD: 71.2 SEC — HIGH (ref 26.1–36.8)
APTT BLD: 79.6 SEC — HIGH (ref 26.1–36.8)
BASOPHILS # BLD AUTO: 0.05 K/UL — SIGNIFICANT CHANGE UP (ref 0–0.2)
BASOPHILS NFR BLD AUTO: 0.2 % — SIGNIFICANT CHANGE UP (ref 0–2)
BLD GP AB SCN SERPL QL: NEGATIVE — SIGNIFICANT CHANGE UP
BUN SERPL-MCNC: 30 MG/DL — HIGH (ref 7–23)
CALCIUM SERPL-MCNC: 7.5 MG/DL — LOW (ref 8.4–10.5)
CHLORIDE SERPL-SCNC: 101 MMOL/L — SIGNIFICANT CHANGE UP (ref 96–108)
CO2 SERPL-SCNC: 25 MMOL/L — SIGNIFICANT CHANGE UP (ref 22–31)
CREAT SERPL-MCNC: 1.54 MG/DL — HIGH (ref 0.5–1.3)
EGFR: 51 ML/MIN/1.73M2 — LOW
EGFR: 51 ML/MIN/1.73M2 — LOW
EOSINOPHIL # BLD AUTO: 0.15 K/UL — SIGNIFICANT CHANGE UP (ref 0–0.5)
EOSINOPHIL NFR BLD AUTO: 0.7 % — SIGNIFICANT CHANGE UP (ref 0–6)
GLUCOSE SERPL-MCNC: 160 MG/DL — HIGH (ref 70–99)
HCT VFR BLD CALC: 28 % — LOW (ref 39–50)
HGB BLD-MCNC: 9.8 G/DL — LOW (ref 13–17)
IMM GRANULOCYTES NFR BLD AUTO: 1.8 % — HIGH (ref 0–0.9)
INR BLD: 1.48 — HIGH (ref 0.85–1.16)
LYMPHOCYTES # BLD AUTO: 0.88 K/UL — LOW (ref 1–3.3)
LYMPHOCYTES # BLD AUTO: 4.2 % — LOW (ref 13–44)
MAGNESIUM SERPL-MCNC: 2.1 MG/DL — SIGNIFICANT CHANGE UP (ref 1.6–2.6)
MCHC RBC-ENTMCNC: 32 PG — SIGNIFICANT CHANGE UP (ref 27–34)
MCHC RBC-ENTMCNC: 35 G/DL — SIGNIFICANT CHANGE UP (ref 32–36)
MCV RBC AUTO: 91.5 FL — SIGNIFICANT CHANGE UP (ref 80–100)
MONOCYTES # BLD AUTO: 1.05 K/UL — HIGH (ref 0–0.9)
MONOCYTES NFR BLD AUTO: 5 % — SIGNIFICANT CHANGE UP (ref 2–14)
NEUTROPHILS # BLD AUTO: 18.63 K/UL — HIGH (ref 1.8–7.4)
NEUTROPHILS NFR BLD AUTO: 88.1 % — HIGH (ref 43–77)
NRBC BLD AUTO-RTO: 0 /100 WBCS — SIGNIFICANT CHANGE UP (ref 0–0)
PHOSPHATE SERPL-MCNC: 2.4 MG/DL — LOW (ref 2.5–4.5)
PLATELET # BLD AUTO: 326 K/UL — SIGNIFICANT CHANGE UP (ref 150–400)
POTASSIUM SERPL-MCNC: 4.2 MMOL/L — SIGNIFICANT CHANGE UP (ref 3.5–5.3)
POTASSIUM SERPL-SCNC: 4.2 MMOL/L — SIGNIFICANT CHANGE UP (ref 3.5–5.3)
PROTHROM AB SERPL-ACNC: 17.2 SEC — HIGH (ref 9.9–13.4)
RBC # BLD: 3.06 M/UL — LOW (ref 4.2–5.8)
RBC # FLD: 14.4 % — SIGNIFICANT CHANGE UP (ref 10.3–14.5)
RH IG SCN BLD-IMP: POSITIVE — SIGNIFICANT CHANGE UP
SODIUM SERPL-SCNC: 137 MMOL/L — SIGNIFICANT CHANGE UP (ref 135–145)
WBC # BLD: 21.13 K/UL — HIGH (ref 3.8–10.5)
WBC # FLD AUTO: 21.13 K/UL — HIGH (ref 3.8–10.5)

## 2025-04-25 PROCEDURE — 99232 SBSQ HOSP IP/OBS MODERATE 35: CPT | Mod: 57

## 2025-04-25 PROCEDURE — 33274 TCAT INSJ/RPL PERM LDLS PM: CPT | Mod: Q0

## 2025-04-25 PROCEDURE — 99232 SBSQ HOSP IP/OBS MODERATE 35: CPT

## 2025-04-25 PROCEDURE — 33235 REMOVAL PACEMAKER ELECTRODE: CPT

## 2025-04-25 PROCEDURE — 99233 SBSQ HOSP IP/OBS HIGH 50: CPT

## 2025-04-25 PROCEDURE — 33233 REMOVAL OF PM GENERATOR: CPT

## 2025-04-25 RX ORDER — OXYCODONE HYDROCHLORIDE 30 MG/1
5 TABLET ORAL ONCE
Refills: 0 | Status: DISCONTINUED | OUTPATIENT
Start: 2025-04-25 | End: 2025-04-25

## 2025-04-25 RX ORDER — ACETAMINOPHEN 500 MG/5ML
1000 LIQUID (ML) ORAL ONCE
Refills: 0 | Status: COMPLETED | OUTPATIENT
Start: 2025-04-25 | End: 2025-04-25

## 2025-04-25 RX ADMIN — OXYCODONE HYDROCHLORIDE 5 MILLIGRAM(S): 30 TABLET ORAL at 21:00

## 2025-04-25 RX ADMIN — Medication 2 TABLET(S): at 21:01

## 2025-04-25 RX ADMIN — Medication 1000 MILLIGRAM(S): at 11:43

## 2025-04-25 RX ADMIN — POLYETHYLENE GLYCOL 3350 17 GRAM(S): 17 POWDER, FOR SOLUTION ORAL at 00:05

## 2025-04-25 RX ADMIN — Medication 100 MILLILITER(S): at 10:54

## 2025-04-25 RX ADMIN — Medication 650 MILLIGRAM(S): at 06:31

## 2025-04-25 RX ADMIN — Medication 400 MILLIGRAM(S): at 11:28

## 2025-04-25 RX ADMIN — HEPARIN SODIUM 1700 UNIT(S)/HR: 1000 INJECTION INTRAVENOUS; SUBCUTANEOUS at 08:07

## 2025-04-25 RX ADMIN — HEPARIN SODIUM 1700 UNIT(S)/HR: 1000 INJECTION INTRAVENOUS; SUBCUTANEOUS at 08:39

## 2025-04-25 RX ADMIN — NAFCILLIN 200 GRAM(S): 2 INJECTION, SOLUTION INTRAVENOUS at 01:05

## 2025-04-25 RX ADMIN — NAFCILLIN 200 GRAM(S): 2 INJECTION, SOLUTION INTRAVENOUS at 23:35

## 2025-04-25 RX ADMIN — OXYCODONE HYDROCHLORIDE 10 MILLIGRAM(S): 30 TABLET ORAL at 01:05

## 2025-04-25 RX ADMIN — HEPARIN SODIUM 1700 UNIT(S)/HR: 1000 INJECTION INTRAVENOUS; SUBCUTANEOUS at 00:35

## 2025-04-25 RX ADMIN — OXYCODONE HYDROCHLORIDE 5 MILLIGRAM(S): 30 TABLET ORAL at 06:31

## 2025-04-25 RX ADMIN — NAFCILLIN 200 GRAM(S): 2 INJECTION, SOLUTION INTRAVENOUS at 19:13

## 2025-04-25 RX ADMIN — LOSARTAN POTASSIUM 50 MILLIGRAM(S): 100 TABLET, FILM COATED ORAL at 06:32

## 2025-04-25 RX ADMIN — Medication 650 MILLIGRAM(S): at 07:30

## 2025-04-25 RX ADMIN — NAFCILLIN 200 GRAM(S): 2 INJECTION, SOLUTION INTRAVENOUS at 05:14

## 2025-04-25 RX ADMIN — OXYCODONE HYDROCHLORIDE 10 MILLIGRAM(S): 30 TABLET ORAL at 00:05

## 2025-04-25 RX ADMIN — Medication 40 MILLIGRAM(S): at 06:32

## 2025-04-25 RX ADMIN — OXYCODONE HYDROCHLORIDE 5 MILLIGRAM(S): 30 TABLET ORAL at 07:30

## 2025-04-25 RX ADMIN — OXYCODONE HYDROCHLORIDE 5 MILLIGRAM(S): 30 TABLET ORAL at 22:00

## 2025-04-25 RX ADMIN — HEPARIN SODIUM 1700 UNIT(S)/HR: 1000 INJECTION INTRAVENOUS; SUBCUTANEOUS at 02:24

## 2025-04-25 RX ADMIN — NAFCILLIN 200 GRAM(S): 2 INJECTION, SOLUTION INTRAVENOUS at 09:12

## 2025-04-25 RX ADMIN — NAFCILLIN 200 GRAM(S): 2 INJECTION, SOLUTION INTRAVENOUS at 13:42

## 2025-04-25 NOTE — PROGRESS NOTE ADULT - ASSESSMENT
63 y/o active M with h/o HTN, HLD, HOCM with CHB, s/p dual chamber PPM (Biotronik) in 1/2025 at [facility], pAFib noted on device in 3/16/25 (on Eliquis at home), and recent dental work few weeks ago (root canal, crown) p/w fever, fatigue, body aches for 10 days, found to have MSSA bacteremia. DONNY at OSH was negative for vegetation on valve or leads.  Urine culture positive for MSSA (likely 2/2 hematogenous spread) and persistent bacteremia.    - NPO for dual chamber ppm explant and a leadless ppm implant today.    - CXR bedside tomorrow.   - Will come out of procedure with a right groin suture (right fem vein access for the micra).  Suture to be removed tomorrow AM by EP.   - No heparin gtt tonight.  Will assess groin wound tomorrow and if stable, will decide on timing of restarting A/C.  So far, only episode of AFIB was noted on 3/16/25 that lasted for 18 mins.  Nothing since.

## 2025-04-25 NOTE — PROGRESS NOTE ADULT - ASSESSMENT
63yo M w/  HTN, HLD, HCM (follows w/ Dr. Sapp), pAFib (on Eliquis, last dose 4/23 , cervical neck surgery (?fusion) 30 years ago, hx of CHB (s/p PPM 1/2025) who initially presented to [facility] with fevers, body aches, fatigue found to have MSSA BSI and suspected PPM infection transferred to [facility] for further management and removal of PPM. Patient febrile on arrival with leukocytosis 23. Complains of neck pain (has hardware) and has ttp of lumbar spine that is new - c/f seeding of infection. Scheduled for PPM extraction today.     Suggest:  -f/u blood culture 4/24 -- in lab  -obtain daily blood cultures for now   -f/u OR PPM extraction. Please send cultures   -f/u gallium scan   -continue nafcillin 2g IV Q4h      Team 2 will follow you. Dr. Mullins will cover the weekend. Dr. Mustafa will resume care on 4/28  Case d/w primary team.  Final recommendation pending attending note.    Yue Noe, Infectious Diseases PA  Please reach out for any questions 9 am-5pm.   For evenings and weekends, please call the ID physician on call.

## 2025-04-25 NOTE — PROVIDER CONTACT NOTE (OTHER) - SITUATION
Alarm events reviewed on cardiac monitor- patient with HR as low as 39 bpm (only once) but mostly as low as 46 bpm
Cardiac monitor alarm events reviewed, patient with HR mostly at 49 bpm but lowest was 47 bpm. In AV / v paced rhythm

## 2025-04-25 NOTE — PROGRESS NOTE ADULT - SUBJECTIVE AND OBJECTIVE BOX
Brief note, full note to follow.  Micra AV implanted via right femoral vein using ultrasound access for femoral venous access.  Micra implanted in high septum.  Programmed VDD  ppm  Sheath removed and stitch placed within stopcock which i will remove in the am.    Following this, his dual chamber pacemaker was removed as well as both leads without difficulty.  The pocket was clean, without evidence of infection. Two swabs of the pocket were sent for Culture and sensitivity.  The leads were unscrewed and removed with manual traction. A Vicryl suture was placed over the access site.  The leads were also sent for culture and sensitivity,   The pocket was closed with 2-0, 3-0, and 4-0 vicryl sutures.    A limited echo was performed by me afterwards, showing no pericardial effusion and normal left ventricular function.    Both procedures tolerated without difficulty.

## 2025-04-25 NOTE — PROGRESS NOTE ADULT - ASSESSMENT
64M w/ PMHx of HTN, HLD, HCM (follows w/ Dr. Sapp), pAFib (on Eliquis, last dose 4/23 PM), hx of CHB (s/p PPM 1/2025) who initially presented to [facility] with fevers, body aches, fatigue. Pt found to be septic and w/ BCx/UCx (+) for MSSA. Of note, patient had root canal 2wks prior to presentation. Per verbal report to RN (documentation was not transferred with the patient), TTE was concerning for aortic valve vegetation but subsequent DONNY was negative. At this time concern that infection source is patient's PPM, now s/p PPM exaction and Micra placement.

## 2025-04-25 NOTE — PROGRESS NOTE ADULT - PROBLEM SELECTOR PLAN 1
Presented to [facility] 4/17 w/ fever/chills/fatigue, found to be septic w/ BCx/UCx (+) for MSSA  - Per verbal report (documentation not transferred with patient), TTE was concerning for aortic valve vegetation, but DONNY was negative for vegetation   - TTE 4/24: LVEF 60%, No RWMA, severely dilated LA, mild to mod MR, fibrocalcific aortic valve, turbulent flow in LVOT  - 2wks prior to presentation pt had root canal  - Concern that PPM could be source of infection  - now s/p PPM + Micra placement   - Nafcillin 2g Q4H, daily blood draw  - f/u gallium scan (hx of titanium screws in C-Spine)  - EP following (known to Dr. Rivero), appreciate recs   - ID following, appreciate recs c/w

## 2025-04-25 NOTE — PROGRESS NOTE ADULT - PROBLEM SELECTOR PLAN 5
Hx of AFib (was noted on PPM that was placed recently)  - s/p Heparin gtt  - per EP, No Heparin gtt or Eliquis tonight   - EP to remove groin suture s/p PPM and Micra placement in AM and give recs when to restart AC       F: none  E: Replete K < 4, Mg < 2  N: DASH/TLC  DVT ppx: EP will give recs AM when to restart AC  Dispo: pending clinical progression

## 2025-04-25 NOTE — PROGRESS NOTE ADULT - SUBJECTIVE AND OBJECTIVE BOX
Cardiology PA Adult Progress Note      SUBJECTIVE ASSESSMENT:  Pt seen and examined at bedside this AM. Pt reports generalized body pain and feeling "uneasy." Offers no other acute complaints & ROS otherwise negative.      MEDICATIONS:  losartan 50 milliGRAM(s) Oral daily  nafcillin  IVPB      nafcillin  IVPB 2 Gram(s) IV Intermittent every 4 hours  acetaminophen     Tablet .. 650 milliGRAM(s) Oral every 6 hours PRN  ALPRAZolam 0.25 milliGRAM(s) Oral every 8 hours PRN  pantoprazole    Tablet 40 milliGRAM(s) Oral before breakfast  polyethylene glycol 3350 17 Gram(s) Oral two times a day PRN  senna 2 Tablet(s) Oral at bedtime  sodium chloride 0.9%. 1000 milliLiter(s) IV Continuous <Continuous>      VITAL SIGNS:  T(C): 37.9 (04-25-25 @ 12:35), Max: 38 (04-24-25 @ 19:37)  HR: 59 (04-25-25 @ 11:14) (48 - 63)  BP: 160/67 (04-25-25 @ 11:14) (108/59 - 160/67)  RR: 18 (04-25-25 @ 11:14) (18 - 22)  SpO2: 94% (04-25-25 @ 11:14) (94% - 97%)  Wt(kg): --    I&O's Summary    24 Apr 2025 07:01  -  25 Apr 2025 07:00  --------------------------------------------------------  IN: 1059 mL / OUT: 1450 mL / NET: -391 mL    25 Apr 2025 07:01  -  25 Apr 2025 17:41  --------------------------------------------------------  IN: 502 mL / OUT: 0 mL / NET: 502 mL      Height (cm): 167.6 (04-25 @ 09:21)  Weight (kg): 74.7 (04-25 @ 09:21)  BMI (kg/m2): 26.6 (04-25 @ 09:21)  BSA (m2): 1.84 (04-25 @ 09:21)      PHYSICAL EXAM:  Appearance: NAD, pt lying in bed 	  HEENT:  PERRL, EOMI	  Cardiovascular: Normal S1 S2, No murmurs, No JVP   Respiratory: Lungs clear to auscultation/No Rales, Rhonchi, Wheezing	  Gastrointestinal:  + BS, Soft, Non-tender	  Extremities: No edema  Vascular: Peripheral pulses palpable   Neurologic: Non-focal  Psychiatry: A & O x 3, Mood & affect appropriate        LABS:	                         9.8    21.13 )-----------( 326      ( 25 Apr 2025 06:27 )             28.0     04-25    137  |  101  |  30[H]  ----------------------------<  160[H]  4.2   |  25  |  1.54[H]    Ca    7.5[L]      25 Apr 2025 06:27  Phos  2.4     04-25  Mg     2.1     04-25    TPro  5.3[L]  /  Alb  2.2[L]  /  TBili  0.4  /  DBili  x   /  AST  58[H]  /  ALT  37  /  AlkPhos  130[H]  04-24    proBNP:   Lipid Profile:   HgA1c:   TSH:   PT/INR - ( 25 Apr 2025 06:27 )   PT: 17.2 sec;   INR: 1.48          PTT - ( 25 Apr 2025 07:55 )  PTT:71.2 sec

## 2025-04-25 NOTE — PROGRESS NOTE ADULT - SUBJECTIVE AND OBJECTIVE BOX
EPS Progress Note    S: seen earlier today. NPO for procedure today. No acute issue overnight.   feels worn down    O: T(C): 37.9 (04-25-25 @ 12:35), Max: 38 (04-24-25 @ 19:37)  HR: 59 (04-25-25 @ 11:14) (48 - 63)  BP: 160/67 (04-25-25 @ 11:14) (108/59 - 160/67)  RR: 18 (04-25-25 @ 11:14) (18 - 22)  SpO2: 94% (04-25-25 @ 11:14) (94% - 97%)    TELE:  sinus with      PHYSICAL  Constitutional:  NAD        Pulm:  CTA B/L  Cardiac:   + s1/s2, RRR. PPM left upper chest wall   GI:  +BS , soft ND/NT  Vascular: No LE edema, pulse 2+  Neuro: AAO x 3. no focal deficit  Skin: Warm. No rash or lesion     LABS:                        9.8    21.13 )-----------( 326      ( 25 Apr 2025 06:27 )             28.0     04-25    137  |  101  |  30[H]  ----------------------------<  160[H]  4.2   |  25  |  1.54[H]    Ca    7.5[L]      25 Apr 2025 06:27  Phos  2.4     04-25  Mg     2.1     04-25    TPro  5.3[L]  /  Alb  2.2[L]  /  TBili  0.4  /  DBili  x   /  AST  58[H]  /  ALT  37  /  AlkPhos  130[H]  04-24    PT/INR - ( 25 Apr 2025 06:27 )   PT: 17.2 sec;   INR: 1.48          PTT - ( 25 Apr 2025 07:55 )  PTT:71.2 sec    MEDICATIONS:  acetaminophen     Tablet .. 650 milliGRAM(s) Oral every 6 hours PRN  ALPRAZolam 0.25 milliGRAM(s) Oral every 8 hours PRN  losartan 50 milliGRAM(s) Oral daily  nafcillin  IVPB      nafcillin  IVPB 2 Gram(s) IV Intermittent every 4 hours  pantoprazole    Tablet 40 milliGRAM(s) Oral before breakfast  polyethylene glycol 3350 17 Gram(s) Oral two times a day PRN  senna 2 Tablet(s) Oral at bedtime  sodium chloride 0.9%. 1000 milliLiter(s) IV Continuous <Continuous>        < from: TTE Echo Complete w/o Contrast w/ Doppler (04.24.25 @ 10:19) >   1. There is normal LV mass and concentric remodeling.   2. Left ventricular cavity is normal in size. Left ventricular systolic   function is normal with an ejection fraction of 60 % by Pineda's method   of disks.There are no regional wall motion abnormalities seen.   3. Normal left ventricular filling pressure.   4. Normal right ventricular cavity size, with normal wall thickness, and   normal right ventricular systolic function.   5. Left atrium is severelydilated.   6. Mild to moderate mitral regurgitation.   7. Fibrocalcific aortic valve sclerosis without stenosis.   8. Turbulent flow is seen in the LVOT. The transaortic gradients are   higher than expected for a fibrocalcific aortic valve. This could be due   to chordal systolic anterior motion of the mitral valve, seen on some   images in this study. However, a subaortic membrane cannot be   conclusively ruled out. Suggest DONNY to better visualize the LVOT, if   clinically indicated.   9. Estimated pulmonary artery systolic pressure is 22 mmHg.  10. No pericardial effusion seen.

## 2025-04-25 NOTE — PROGRESS NOTE ADULT - NS ATTEND AMEND GEN_ALL_CORE FT
Mr. Kennedy is a 64M with history of HCM, HTN, paroxysmal atrial fibrillation (Eliquis, last dose 4/23), recent CHB with PPM dual chamber in Jan 2025 presented initially to [facility] with fevers, body aches and admitted for sesis with MSSA bacteremia. Persistent fevers and did not clear cultures, concern for need for PPM device removal.     Exam:   NAD  JVP normal  Lungs CTA  2/6 systolic murmur  WWP, no edema      MSSA bacteremia- ID consulted,  Nafcillin, f/u  ID recommending Device removal and Gallium scan.  PPM- Need for device removal in setting of unable to clear cultures. EP consulted plan for extraction today with micra for temporary pacing while awaiting safety of replacement  HCM- Follows with HF Sekou, no reported gradients. Avoid hypovolemia. Mr. Kennedy is a 64M with history of HCM, HTN, paroxysmal atrial fibrillation (Eliquis, last dose 4/23), recent CHB with PPM dual chamber in Jan 2025 presented initially to [facility] with fevers, body aches and admitted for sepsis with MSSA bacteremia. Persistent fevers and did not clear cultures, concern for need for PPM device removal.     Exam:   NAD  JVP normal  Lungs CTA  2/6 systolic murmur  WWP, no edema      MSSA bacteremia- ID consulted,  Nafcillin, f/u  ID recommending Device removal and Gallium scan.  PPM- Need for device removal in setting of unable to clear cultures. EP consulted plan for extraction today with micra for temporary pacing while awaiting safety of replacement  HCM- Follows with HF Sekou, no reported gradients. Avoid hypovolemia.

## 2025-04-25 NOTE — PRE-ANESTHESIA EVALUATION ADULT - NSANTHPMHFT_GEN_ALL_CORE
65yo M w/ PMHx of HTN, HLD, HCM (follows w/ Dr. Sapp), pAFib (on Eliquis, last dose 4/23 PM), hx of CHB (s/p PPM 1/2025) who initially presented to [facility] with fevers, body aches, fatigue. Pt found to be septic and w/ BCx/UCx (+) for MSSA. Of note, patient had root canal 2wks prior to presentation. Patient was started on Cefazolin. Per verbal report to RN (documentation was not transferred with the patient), TTE was concerning for aortic valve vegitation, but subsequent DONNY was negative. At this time concern that infection source is patient's PPM. On arrival to [facility], pt reports feeling "run down", but no CP, palpitations, dizziness, abdominal pain, N/V, fever/chills, orthopnea. Plan for EP consult in morning to discuss suspected PPM infection (patient known to EP service)

## 2025-04-25 NOTE — PROGRESS NOTE ADULT - SUBJECTIVE AND OBJECTIVE BOX
INFECTIOUS DISEASES CONSULT FOLLOW-UP NOTE    INTERVAL HPI/OVERNIGHT EVENTS:    Patient seen and examined at bedside. DEANNE. Fever last night to 100.4, reports body aches       ROS:   Constitutional, eyes, ENT, cardiovascular, respiratory, gastrointestinal, genitourinary, integumentary, neurological, psychiatric and heme/lymph are otherwise negative other than noted above       ANTIBIOTICS/RELEVANT:    MEDICATIONS  (STANDING):  heparin  Infusion.  Unit(s)/Hr (13 mL/Hr) IV Continuous <Continuous>  losartan 50 milliGRAM(s) Oral daily  nafcillin  IVPB      nafcillin  IVPB 2 Gram(s) IV Intermittent every 4 hours  pantoprazole    Tablet 40 milliGRAM(s) Oral before breakfast  senna 2 Tablet(s) Oral at bedtime  sodium chloride 0.9%. 1000 milliLiter(s) (100 mL/Hr) IV Continuous <Continuous>    MEDICATIONS  (PRN):  acetaminophen     Tablet .. 650 milliGRAM(s) Oral every 6 hours PRN Temp greater or equal to 38C (100.4F), Mild Pain (1 - 3), Moderate Pain (4 - 6)  ALPRAZolam 0.25 milliGRAM(s) Oral every 8 hours PRN anxiety  heparin   Injectable 6000 Unit(s) IV Push every 6 hours PRN For aPTT less than 40  heparin   Injectable 3000 Unit(s) IV Push every 6 hours PRN For aPTT between 40 - 57  polyethylene glycol 3350 17 Gram(s) Oral two times a day PRN Constipation        Vital Signs Last 24 Hrs  T(C): 36.7 (25 Apr 2025 09:21), Max: 38 (24 Apr 2025 19:37)  T(F): 98.1 (25 Apr 2025 08:37), Max: 100.4 (24 Apr 2025 19:37)  HR: 63 (25 Apr 2025 09:21) (48 - 63)  BP: 113/71 (25 Apr 2025 09:21) (108/59 - 156/71)  BP(mean): 88 (25 Apr 2025 09:21) (77 - 102)  RR: 18 (25 Apr 2025 09:21) (18 - 22)  SpO2: 95% (25 Apr 2025 09:21) (95% - 97%)    Parameters below as of 25 Apr 2025 08:37  Patient On (Oxygen Delivery Method): room air        04-24-25 @ 07:01  -  04-25-25 @ 07:00  --------------------------------------------------------  IN: 1059 mL / OUT: 1450 mL / NET: -391 mL    04-25-25 @ 07:01  -  04-25-25 @ 10:43  --------------------------------------------------------  IN: 0 mL / OUT: 0 mL / NET: 0 mL      PHYSICAL EXAM:  Constitutional: alert, NAD  Eyes: the sclera and conjunctiva were normal.  ENT: the ears and nose were normal in appearance.   Neck: the appearance of the neck was normal and the neck was supple  Pulmonary: no respiratory distress and symmetric chest rise   Heart: heart rate was normal and rhythm regular, normal S1 and S2. +systolic murmur   Vascular: +1 edema of b/l UE and LE   Abdomen: normal bowel sounds, soft, mild RLQ tenderness   Neurological: no focal deficits.   Psychiatric: the affect was normal      LABS:                        9.8    21.13 )-----------( 326      ( 25 Apr 2025 06:27 )             28.0     04-25    137  |  101  |  30[H]  ----------------------------<  160[H]  4.2   |  25  |  1.54[H]    Ca    7.5[L]      25 Apr 2025 06:27  Phos  2.4     04-25  Mg     2.1     04-25    TPro  5.3[L]  /  Alb  2.2[L]  /  TBili  0.4  /  DBili  x   /  AST  58[H]  /  ALT  37  /  AlkPhos  130[H]  04-24    PT/INR - ( 25 Apr 2025 06:27 )   PT: 17.2 sec;   INR: 1.48          PTT - ( 25 Apr 2025 07:55 )  PTT:71.2 sec  Urinalysis Basic - ( 25 Apr 2025 06:27 )    Color: x / Appearance: x / SG: x / pH: x  Gluc: 160 mg/dL / Ketone: x  / Bili: x / Urobili: x   Blood: x / Protein: x / Nitrite: x   Leuk Esterase: x / RBC: x / WBC x   Sq Epi: x / Non Sq Epi: x / Bacteria: x        MICROBIOLOGY:    Urinalysis with Rflx Culture (collected 04-24-25 @ 03:07)          RADIOLOGY & ADDITIONAL STUDIES:    TTE 4/24     CONCLUSIONS:     1. There is normal LV mass and concentric remodeling.   2. Left ventricular cavity is normal in size. Left ventricular systolic   function is normal with an ejection fraction of 60 % by Pineda's method   of disks. There are no regional wall motion abnormalities seen.   3. Normal left ventricular filling pressure.   4. Normal right ventricular cavity size, with normal wall thickness, and   normal right ventricular systolic function.   5. Left atrium is severely dilated.   6. Mild to moderate mitral regurgitation.   7. Fibrocalcific aortic valve sclerosis without stenosis.   8. Turbulent flow is seen in the LVOT. The transaortic gradients are   higher than expected for a fibrocalcific aortic valve. This could be due   to chordal systolic anterior motion of the mitral valve, seen on some   images in this study. However, a subaortic membrane cannot be   conclusively ruled out. Suggest DONNY to better visualize the LVOT, if   clinically indicated.   9. Estimated pulmonary artery systolic pressure is 22 mmHg.  10. No pericardial effusion seen.

## 2025-04-26 DIAGNOSIS — R45.851 SUICIDAL IDEATIONS: ICD-10-CM

## 2025-04-26 LAB
ANION GAP SERPL CALC-SCNC: 9 MMOL/L — SIGNIFICANT CHANGE UP (ref 5–17)
APTT BLD: 28.4 SEC — SIGNIFICANT CHANGE UP (ref 26.1–36.8)
BUN SERPL-MCNC: 34 MG/DL — HIGH (ref 7–23)
CALCIUM SERPL-MCNC: 7.4 MG/DL — LOW (ref 8.4–10.5)
CHLORIDE SERPL-SCNC: 105 MMOL/L — SIGNIFICANT CHANGE UP (ref 96–108)
CK SERPL-CCNC: 31 U/L — SIGNIFICANT CHANGE UP (ref 30–200)
CO2 SERPL-SCNC: 24 MMOL/L — SIGNIFICANT CHANGE UP (ref 22–31)
CREAT SERPL-MCNC: 1.68 MG/DL — HIGH (ref 0.5–1.3)
EGFR: 46 ML/MIN/1.73M2 — LOW
EGFR: 46 ML/MIN/1.73M2 — LOW
GLUCOSE SERPL-MCNC: 142 MG/DL — HIGH (ref 70–99)
HCT VFR BLD CALC: 29.9 % — LOW (ref 39–50)
HGB BLD-MCNC: 10.3 G/DL — LOW (ref 13–17)
MAGNESIUM SERPL-MCNC: 2.2 MG/DL — SIGNIFICANT CHANGE UP (ref 1.6–2.6)
MCHC RBC-ENTMCNC: 31.6 PG — SIGNIFICANT CHANGE UP (ref 27–34)
MCHC RBC-ENTMCNC: 34.4 G/DL — SIGNIFICANT CHANGE UP (ref 32–36)
MCV RBC AUTO: 91.7 FL — SIGNIFICANT CHANGE UP (ref 80–100)
NRBC BLD AUTO-RTO: 0 /100 WBCS — SIGNIFICANT CHANGE UP (ref 0–0)
PLATELET # BLD AUTO: 370 K/UL — SIGNIFICANT CHANGE UP (ref 150–400)
POTASSIUM SERPL-MCNC: 4.1 MMOL/L — SIGNIFICANT CHANGE UP (ref 3.5–5.3)
POTASSIUM SERPL-SCNC: 4.1 MMOL/L — SIGNIFICANT CHANGE UP (ref 3.5–5.3)
RBC # BLD: 3.26 M/UL — LOW (ref 4.2–5.8)
RBC # FLD: 14.5 % — SIGNIFICANT CHANGE UP (ref 10.3–14.5)
SODIUM SERPL-SCNC: 138 MMOL/L — SIGNIFICANT CHANGE UP (ref 135–145)
WBC # BLD: 17.15 K/UL — HIGH (ref 3.8–10.5)
WBC # FLD AUTO: 17.15 K/UL — HIGH (ref 3.8–10.5)

## 2025-04-26 PROCEDURE — 99254 IP/OBS CNSLTJ NEW/EST MOD 60: CPT

## 2025-04-26 PROCEDURE — 99233 SBSQ HOSP IP/OBS HIGH 50: CPT

## 2025-04-26 PROCEDURE — 71045 X-RAY EXAM CHEST 1 VIEW: CPT | Mod: 26

## 2025-04-26 PROCEDURE — 93279 PRGRMG DEV EVAL PM/LDLS PM: CPT | Mod: 26

## 2025-04-26 PROCEDURE — 99231 SBSQ HOSP IP/OBS SF/LOW 25: CPT

## 2025-04-26 PROCEDURE — 93010 ELECTROCARDIOGRAM REPORT: CPT

## 2025-04-26 PROCEDURE — 99232 SBSQ HOSP IP/OBS MODERATE 35: CPT

## 2025-04-26 RX ORDER — APIXABAN 2.5 MG/1
5 TABLET, FILM COATED ORAL EVERY 12 HOURS
Refills: 0 | Status: DISCONTINUED | OUTPATIENT
Start: 2025-04-27 | End: 2025-05-01

## 2025-04-26 RX ORDER — CLONAZEPAM 0.5 MG/1
0.5 TABLET ORAL AT BEDTIME
Refills: 0 | Status: DISCONTINUED | OUTPATIENT
Start: 2025-04-26 | End: 2025-05-01

## 2025-04-26 RX ORDER — OXYCODONE HYDROCHLORIDE 30 MG/1
5 TABLET ORAL EVERY 6 HOURS
Refills: 0 | Status: DISCONTINUED | OUTPATIENT
Start: 2025-04-26 | End: 2025-05-01

## 2025-04-26 RX ADMIN — Medication 650 MILLIGRAM(S): at 23:39

## 2025-04-26 RX ADMIN — Medication 40 MILLIGRAM(S): at 06:21

## 2025-04-26 RX ADMIN — Medication 650 MILLIGRAM(S): at 01:12

## 2025-04-26 RX ADMIN — Medication 650 MILLIGRAM(S): at 08:10

## 2025-04-26 RX ADMIN — OXYCODONE HYDROCHLORIDE 5 MILLIGRAM(S): 30 TABLET ORAL at 06:38

## 2025-04-26 RX ADMIN — Medication 650 MILLIGRAM(S): at 16:37

## 2025-04-26 RX ADMIN — Medication 650 MILLIGRAM(S): at 22:39

## 2025-04-26 RX ADMIN — Medication 650 MILLIGRAM(S): at 09:31

## 2025-04-26 RX ADMIN — OXYCODONE HYDROCHLORIDE 5 MILLIGRAM(S): 30 TABLET ORAL at 19:27

## 2025-04-26 RX ADMIN — OXYCODONE HYDROCHLORIDE 5 MILLIGRAM(S): 30 TABLET ORAL at 12:15

## 2025-04-26 RX ADMIN — NAFCILLIN 200 GRAM(S): 2 INJECTION, SOLUTION INTRAVENOUS at 03:07

## 2025-04-26 RX ADMIN — POLYETHYLENE GLYCOL 3350 17 GRAM(S): 17 POWDER, FOR SOLUTION ORAL at 01:12

## 2025-04-26 RX ADMIN — NAFCILLIN 200 GRAM(S): 2 INJECTION, SOLUTION INTRAVENOUS at 07:04

## 2025-04-26 RX ADMIN — Medication 650 MILLIGRAM(S): at 02:00

## 2025-04-26 RX ADMIN — Medication 650 MILLIGRAM(S): at 17:34

## 2025-04-26 RX ADMIN — NAFCILLIN 200 GRAM(S): 2 INJECTION, SOLUTION INTRAVENOUS at 20:26

## 2025-04-26 RX ADMIN — CLONAZEPAM 0.5 MILLIGRAM(S): 0.5 TABLET ORAL at 21:58

## 2025-04-26 RX ADMIN — OXYCODONE HYDROCHLORIDE 5 MILLIGRAM(S): 30 TABLET ORAL at 07:35

## 2025-04-26 RX ADMIN — NAFCILLIN 200 GRAM(S): 2 INJECTION, SOLUTION INTRAVENOUS at 12:16

## 2025-04-26 RX ADMIN — OXYCODONE HYDROCHLORIDE 5 MILLIGRAM(S): 30 TABLET ORAL at 13:16

## 2025-04-26 RX ADMIN — NAFCILLIN 200 GRAM(S): 2 INJECTION, SOLUTION INTRAVENOUS at 16:38

## 2025-04-26 RX ADMIN — LOSARTAN POTASSIUM 50 MILLIGRAM(S): 100 TABLET, FILM COATED ORAL at 06:21

## 2025-04-26 NOTE — BH CONSULTATION LIAISON ASSESSMENT NOTE - SUMMARY
Patient is a 64 year old male, privately domiciled with wife, PPHx of anxiety, no prior psychiatric hospitalizations, no known SA, PMHx of Afib, congenital heart block, admitted with sepsis and TTE concerning for aortic valve vegetation, now s/p pacemaker replacement. Psychiatry consulted for SI.    On exam, patient is irritable, linear, in good behavioral control. He endorses passive SI without plan or intent in the setting of frustration with prolonged hospitalization and isolation from family and friends. He is future oriented, and amenable to continued hospitalization, including plan for upcoming gallium scan. Clinical diagnosis most likely for adjustment disorder with anxiety.    - Continue 1:1 for SI  - Start home Klonopin 0.5mg qhs for anxiety  - Psychiatry to follow

## 2025-04-26 NOTE — PROGRESS NOTE ADULT - SUBJECTIVE AND OBJECTIVE BOX
Patient feels tired but no specific complaints.  Injected for gallium scan this morning, to be scanned Monday morning to evaluate hardware in neck.  Underwent Micra placement yesterday. Functions well, tracking atrium, with PVCs noted.   R waves none seen with inhibition, impedance normal, threshold less than 0.25 volts    Site of device and lead removal clean, no hematoma.  Cultures of device leads and pocket sent yesterday.    Continuing antibiotics per ID recommendations.  Silk suture in right groin removed and hemostasis applied at 9 am. Should be at bed rest until 11:30 am

## 2025-04-26 NOTE — BH CONSULTATION LIAISON ASSESSMENT NOTE - NSICDXPASTMEDICALHX_GEN_ALL_CORE_FT
PAST MEDICAL HISTORY:  A-fib     CKD (chronic kidney disease)     HLD (hyperlipidemia)     HTN (hypertension)     Hypertrophic cardiomyopathy

## 2025-04-26 NOTE — PROVIDER CONTACT NOTE (CHANGE IN STATUS NOTIFICATION) - ASSESSMENT
alerted by hospital  that pt voiced SI with threat to harm self. provider KEISHA Marcus went to bedside assessed pt and deemed clinically appropriate at this time to initiate CO for SI and consult psychiatry

## 2025-04-26 NOTE — PROGRESS NOTE ADULT - ASSESSMENT
63yo M w/  HTN, HLD, HCM (follows w/ Dr. Sapp), pAFib (on Eliquis, last dose 4/23 , cervical neck surgery (?fusion) 30 years ago, hx of CHB (s/p PPM 1/2025) who initially presented to [facility] with fevers, body aches, fatigue found to have MSSA BSI and suspected PPM infection transferred to [facility] for further management and removal of PPM. Patient febrile on arrival with leukocytosis 23. New complaints of neck pain yesterday - c/f seeding of infection given cervical hardware from surgery 30 yrs ago. S/p PPM extraction 4/25, now has leadless Micra device. Pending Gallium scan for further eval of cervical hardware.     Recommendations:  - F/u BCx from 4/24 -- NGTD x48 hours  - F/u BCx from 4/25  - F/u OR PPM cultures  - F/u Gallium scan -- patient was injected with gallium today   - Obtain daily blood cultures for now   - Continue nafcillin 2g IV Q4h    Team 2 will follow you. Dr. Mullins will cover the weekend. Dr. Mustafa will resume care on 4/28  Case d/w primary team.  Final recommendation pending attending note.   63yo M w/  HTN, HLD, HCM (follows w/ Dr. Sapp), pAFib (on Eliquis, last dose 4/23 , cervical neck surgery (?fusion) 30 years ago, hx of CHB (s/p PPM 1/2025) who initially presented to [facility] with fevers, body aches, fatigue found to have MSSA BSI and suspected PPM infection transferred to [facility] for further management and removal of PPM. Patient febrile on arrival with leukocytosis 23. New complaints of neck pain yesterday - c/f seeding of infection given cervical hardware from surgery 30 yrs ago. S/p PPM extraction 4/25, now has leadless Micra device. Pending Gallium scan for further eval of cervical hardware.     Recommendations:  - F/u BCx from 4/24 -- NGTD x48 hours  - F/u BCx from 4/25  - F/u OR PPM cultures  - F/u Gallium scan -- patient was injected with gallium today   - Obtain daily blood cultures for now   - Continue nafcillin 2g IV Q4h    Team 2 will follow you. Dr. Mullins will cover the weekend. Dr. Mustafa will resume care on 4/28.  Final recommendation pending attending note.

## 2025-04-26 NOTE — BH CONSULTATION LIAISON ASSESSMENT NOTE - HPI (INCLUDE ILLNESS QUALITY, SEVERITY, DURATION, TIMING, CONTEXT, MODIFYING FACTORS, ASSOCIATED SIGNS AND SYMPTOMS)
Patient is a 64 year old male, privately domiciled with wife, PPHx of anxiety, no prior psychiatric hospitalizations, no known SA, PMHx of Afib, congenital heart block, admitted with sepsis and TTE concerning for aortic valve vegetation, now s/p pacemaker replacement. Psychiatry consulted for SI.    On exam, patient is irritable, linear, in good behavioral control. He reports passive SI without plan or intent while hospitalized because he has been frustrated being alone and wants to go home. He reports this is the first time he has ever felt like this. He reports that if he were to return home, he would have no SI. He denies HI/AH/VH. When asked if he feels depressed, he says "I guess," and then adds "I just am done with being hospitalized, I want to go home." He reports rare alcohol use on holidays, otherwise irvin denies all substances. He reports that he takes Klonopin 0.5mg nightly prescribed by his PCP for at least the past 3 years to help "take the edge off." Confirmed via [employer] #[id number], last dispensed on 4/16/25 by Dr. Armando Adame. Otherwise, he reports no prior psychiatric medications. He denies any prior psychiatric hospitalizations, or ever following with an outpatient psychiatrist. He shares that his wife will be staying with him at the [facility], so he feels he will not be suicidal. He shares that his primary team told him he is scheduled for gallium scan on Monday, which he is amenable to staying for.    Collateral: Wife, Daphne, [phone]: Confirms that patient has never had a history of SA or suicidal ideation. Confirms psychiatric history as per above. She feels he will be fine once he is closer to discharge since he does not like being at the hospital. No other acute concerns.

## 2025-04-26 NOTE — PROGRESS NOTE ADULT - SUBJECTIVE AND OBJECTIVE BOX
Infectious Disease Progress Note:    INTERVAL HPI/OVERNIGHT EVENTS:  Patient was seen and examined at bedside. Case discussed with attending physician during morning rounds.     As per patient, no o/n events, patient resting comfortably. C/o feeling hot in the current room, needs a fan. Otherwise denies fevers, chills, malaise. No other complaints today. Had PPM removed yesterday; tolerated procedure well.     PAST MEDICAL HISTORY:  MSSA bacteremia  H/O hypertrophic cardiomyopathy  Chronic kidney disease (CKD)  CHB (complete heart block)  Afib      ROS:  CONSTITUTIONAL:  Negative fever or chills, feels well, good appetite  EYES:  Negative  blurry vision or double vision  CARDIOVASCULAR:  Negative for chest pain or palpitations  RESPIRATORY:  Negative for cough, wheezing, or SOB   GASTROINTESTINAL:  Negative for nausea, vomiting, diarrhea, constipation, or abdominal pain  GENITOURINARY:  Negative frequency, urgency or dysuria  NEUROLOGIC:  No headache, confusion, dizziness, lightheadedness    Allergies    No Known Allergies    Intolerances        ANTIBIOTICS/RELEVANT:  antimicrobials  nafcillin  IVPB      nafcillin  IVPB 2 Gram(s) IV Intermittent every 4 hours    immunologic:    OTHER:  acetaminophen     Tablet .. 650 milliGRAM(s) Oral every 6 hours PRN  ALPRAZolam 0.25 milliGRAM(s) Oral every 8 hours PRN  losartan 50 milliGRAM(s) Oral daily  oxyCODONE    IR 5 milliGRAM(s) Oral every 6 hours PRN  pantoprazole    Tablet 40 milliGRAM(s) Oral before breakfast  polyethylene glycol 3350 17 Gram(s) Oral two times a day PRN  senna 2 Tablet(s) Oral at bedtime  sodium chloride 0.9%. 1000 milliLiter(s) IV Continuous <Continuous>      Objective:  Vital Signs Last 24 Hrs  T(C): 36.5 (26 Apr 2025 12:18), Max: 37.7 (26 Apr 2025 08:25)  T(F): 97.7 (26 Apr 2025 12:18), Max: 99.8 (26 Apr 2025 08:25)  HR: 56 (26 Apr 2025 12:18) (54 - 66)  BP: 144/99 (26 Apr 2025 12:18) (124/57 - 191/79)  BP(mean): 116 (26 Apr 2025 12:18) (79 - 116)  RR: 18 (26 Apr 2025 12:18) (16 - 20)  SpO2: 95% (26 Apr 2025 12:18) (95% - 99%)    Parameters below as of 26 Apr 2025 12:18  Patient On (Oxygen Delivery Method): room air      PHYSICAL EXAM:  Constitutional: well appearing resting comfortably; NAD  HEENT: NC/AT, anicteric sclera, MMM  Neck: supple, no TTP or edema   Respiratory: CTA b/l, no W/R/R, no retractions  Cardiovascular: +S1/S2; RRR; no M/R/G; bandage over prior PPM site c/d/i  Gastrointestinal: soft, NT/ND; no rebound or guarding  Extremities: no joint swelling, edema or erythema  Dermatologic: warm, dry, intact  Neurologic: AAOx3, moving all extremities, sensation intact  Psychiatric: calm, cooperative    LABS:                        10.3   17.15 )-----------( 370      ( 26 Apr 2025 06:11 )             29.9     04-26    138  |  105  |  34[H]  ----------------------------<  142[H]  4.1   |  24  |  1.68[H]    Ca    7.4[L]      26 Apr 2025 06:11  Phos  2.4     04-25  Mg     2.2     04-26      PT/INR - ( 25 Apr 2025 06:27 )   PT: 17.2 sec;   INR: 1.48          PTT - ( 26 Apr 2025 06:11 )  PTT:28.4 sec  Urinalysis Basic - ( 26 Apr 2025 06:11 )    Color: x / Appearance: x / SG: x / pH: x  Gluc: 142 mg/dL / Ketone: x  / Bili: x / Urobili: x   Blood: x / Protein: x / Nitrite: x   Leuk Esterase: x / RBC: x / WBC x   Sq Epi: x / Non Sq Epi: x / Bacteria: x      MICROBIOLOGY:  4/24 blood cultures: no growth x48 hours  4/25 blood cultures pending  4/25 OR cultures from PPM removal pending    RADIOLOGY & ADDITIONAL STUDIES: reviewed

## 2025-04-26 NOTE — PROGRESS NOTE ADULT - PROBLEM SELECTOR PLAN 3
- Pt has HCM, follows w/ Dr. Bib Sapp.   - TTE (3/7/25): EF 65%, LVH, LVOT (gradient 15mmHg at rest, 88mmHg w/ valsalva), ABDULLAHI of AMVL and chordal apparatus, mild LA/RA dilation, trace MR, aortic stenosis (MG 10, PG 20), small pericardial effusion w/o tamponade.  - TTE (4/24/25):  LVEF 60 %. No RWMA. LA severely dilated. Mild to mod MR. Fibrocalcific aortic valve sclerosis without stenosis. Turbulent flow is seen in the LVOT. Transaortic gradients higher than expected for a fibrocalcific aortic valve. This could be due to chordal systolic anterior motion of the mitral valve, seen on some images in this study. Subaortic membrane cannot be conclusively ruled out. Suggest DONNY to better visualize the LVOT.  PASP 22 mmHg.

## 2025-04-26 NOTE — PROGRESS NOTE ADULT - ASSESSMENT
64M w/ PMHx of HTN, HLD, HCM (follows w/ Dr. Sapp), pAFib (on Eliquis, last dose 4/23 PM), hx of CHB (s/p PPM 1/2025) who initially presented to [facility] with fevers, body aches, fatigue. Pt found to be septic and w/ BCx/UCx (+) for MSSA. Of note, patient had root canal 2wks prior to presentation. Per verbal report to RN (documentation was not transferred with the patient), TTE was concerning for aortic valve vegetation but subsequent DONNY was negative. At this time concern that infection source is patient's PPM, now s/p PPM exaction and Micra placement.

## 2025-04-26 NOTE — PROGRESS NOTE ADULT - PROBLEM SELECTOR PLAN 2
RN informed provider that  told her that pt wanted to "end it all" and wants to "take 4 oxycodone and not wake up"  - Provider went to assess pt  - Pt states he is unhappy in this hospital. States he is unable to walk and just a few weeks ago he was able to run 8 miles. Additionally states he is unable to do the things he used to do such as feeding himself and opening a bottle of water. He further states he feels he should go to "sleep and not wake up" and feels that "everything should end"  - 1:1 was ordered  - Psych was consulted, pending recs  - Pt's room was changed so he would have a window view  - PT/OT consulted    ##Pt preference to transfer to [facility]  - Pt's family requested transfer to [facility] because they were unhappy with his room   - Per the transfer center, pt would need insurance auth that typically takes 24 hours, pt will have a high co-pay as well as be responsible for the cost of the ambulance   - After further discussion with pt and family, they decided he will stay until Monday for the Gallium scan RN informed provider that  told her that pt wanted to "end it all" and wants to "take 4 oxycodone and not wake up"  - Provider went to assess pt  - Pt states he is unhappy in this hospital. States he is unable to walk now and just a few weeks ago he was able to run 8 miles. Additionally states he is unable to do the things he used to do, such as feeding himself and opening a bottle of water. He further states that he feels he should "go to sleep and not wake up" and feels that "everything should end."  - 1:1 was ordered  - Psych was consulted, pending recs  - Pt's room was changed so he would have a window view  - PT/OT consulted  - Family was updated at bedside and agreed with the plan (1:1, Psych consult, room change, PT/OT).    ##Pt preference to transfer to [facility]  - Pt's family requested transfer to [facility] because they were unhappy with his room   - Per the transfer center, pt would need insurance auth that typically takes 24 hours, pt will have a high co-pay as well as be responsible for the cost of the ambulance   - After further discussion with pt and family, they decided he will stay until Monday for the Gallium scan

## 2025-04-26 NOTE — BH CONSULTATION LIAISON ASSESSMENT NOTE - RISK ASSESSMENT
Suicide Risk: Chronic risk factors include history of anxiety symptoms. Acute risk factors include recent medical co-morbidities and recent SI. Mitigating factors include the patient denies active SI, the patient is future oriented, and the patient is hopeful for the future. Given these factors, the patient is at a low chronic risk and moderate acute risk of suicide.    Violence Risk: No risk factors.

## 2025-04-26 NOTE — BH CONSULTATION LIAISON ASSESSMENT NOTE - NSBHCHARTREVIEWVS_PSY_A_CORE FT
Vital Signs Last 24 Hrs  T(C): 37.6 (26 Apr 2025 20:25), Max: 37.7 (26 Apr 2025 08:25)  T(F): 99.6 (26 Apr 2025 20:25), Max: 99.9 (26 Apr 2025 16:37)  HR: 52 (26 Apr 2025 20:25) (52 - 66)  BP: 150/67 (26 Apr 2025 20:25) (124/57 - 191/79)  BP(mean): 93 (26 Apr 2025 20:25) (79 - 116)  RR: 18 (26 Apr 2025 20:25) (16 - 20)  SpO2: 93% (26 Apr 2025 20:25) (93% - 99%)    Parameters below as of 26 Apr 2025 20:25  Patient On (Oxygen Delivery Method): room air

## 2025-04-26 NOTE — PROVIDER CONTACT NOTE (CHANGE IN STATUS NOTIFICATION) - RECOMMENDATIONS
CO in place and ongoing with family at bedside for support. pt appears pleasant, calm and appropriate. following commands and treatment plan.

## 2025-04-26 NOTE — PROGRESS NOTE ADULT - PROBLEM SELECTOR PLAN 5
Hx of CHB w/ recent PPM placed 1/2025 (Pt known to EP Dr. Rivero and Dr. Victor)  - pt with bacteriemia s/p PPM extraction and Micra placement w/ plan for replacement of his PPM in a few months

## 2025-04-26 NOTE — PROGRESS NOTE ADULT - NS ATTEND AMEND GEN_ALL_CORE FT
64M w/ PMHx of HTN, HLD, HCM (follows w/ Dr. Sapp), pAFib (on Eliquis, last dose 4/23 PM), hx of CHB (s/p PPM 1/2025) who initially presented to [facility] with fevers, body aches, fatigue. Pt found to be septic and w/ BCx/UCx (+) for MSSA. Of note, patient had root canal 2wks prior to presentation. Per verbal report to RN (documentation was not transferred with the patient), TTE was concerning for aortic valve vegetation but subsequent DONNY was negative. At this time concern that infection source is patient's PPM, now s/p PPM exaction and Micra placement.    - awaiting gallium scan  - needs to complete abx course per ID  - would resume BB he his post micra  - myalgias and weakness likely due to infection

## 2025-04-26 NOTE — PROGRESS NOTE ADULT - PROBLEM SELECTOR PLAN 6
Hx of AFib (was noted on PPM that was placed recently)  - s/p Heparin gtt  - Per Dr. Angulo, restart Eliquis 4/27/25      F: none  E: Replete K < 4, Mg < 2  N: DASH/TLC  DVTppx: restart Eliquis 2/27/25  GIppx: Pantoprazole  PT/OT: consulted    Dispo: pending clinical progression Hx of AFib (was noted on PPM that was placed recently)  - s/p Heparin gtt  - Per Dr. Angulo, restart Eliquis 4/27/25      F: none  E: Replete K < 4, Mg < 2  N: DASH/TLC  DVTppx: restart Eliquis 2/27/25  GIppx: Pantoprazole  PT/OT: consulted    Dispo: pending clinical progression    Case discussed w/ Dr. Lazcano

## 2025-04-26 NOTE — PROGRESS NOTE ADULT - PROBLEM SELECTOR PLAN 1
Presented to [facility] 4/17 w/ fever/chills/fatigue, found to be septic w/ BCx/UCx (+) for MSSA  - Per verbal report (documentation not transferred with patient), TTE was concerning for aortic valve vegetation, but DONNY was negative for vegetation   - 2wks prior to presentation pt had root canal  - Concern that PPM could be source of infection s/p PPM extraction and Micra placement   - c/w Nafcillin 2g Q4H and daily blood culture   - f/u gallium scan (hx of titanium screws in C-Spine) --> pt injected with isotope on 4/26/25 and will be scanned on 4/28/25  - EP following (known to Dr. Rivero), appreciate recs   - ID following, appreciate recs

## 2025-04-26 NOTE — CHART NOTE - NSCHARTNOTEFT_GEN_A_CORE
This morning RN informed provider that  told her that pt wanted to "end it all" and wants to "take 4 oxycodone and not wake up."  - Provider went to assess pt  - Pt states he is unhappy in this hospital. States he is unable to walk now and just a few weeks ago he was able to run 8 miles. Additionally states he is unable to do the things he used to do, such as feeding himself and opening a bottle of water. He further states that he feels he should "go to sleep and not wake up" and feels that "everything should end."  - 1:1 was ordered  - Psych was consulted  - Pt's room was changed so he would have a window view  - PT/OT consulted  - Family was updated at bedside and agreed with the plan (1:1, Psych consult, room change, PT/OT)

## 2025-04-26 NOTE — BH CONSULTATION LIAISON ASSESSMENT NOTE - CURRENT MEDICATION
MEDICATIONS  (STANDING):  losartan 50 milliGRAM(s) Oral daily  nafcillin  IVPB      nafcillin  IVPB 2 Gram(s) IV Intermittent every 4 hours  pantoprazole    Tablet 40 milliGRAM(s) Oral before breakfast  senna 2 Tablet(s) Oral at bedtime  sodium chloride 0.9%. 1000 milliLiter(s) (100 mL/Hr) IV Continuous <Continuous>    MEDICATIONS  (PRN):  acetaminophen     Tablet .. 650 milliGRAM(s) Oral every 6 hours PRN Temp greater or equal to 38C (100.4F), Mild Pain (1 - 3), Moderate Pain (4 - 6)  ALPRAZolam 0.25 milliGRAM(s) Oral every 8 hours PRN anxiety  oxyCODONE    IR 5 milliGRAM(s) Oral every 6 hours PRN Severe Pain (7 - 10)  polyethylene glycol 3350 17 Gram(s) Oral two times a day PRN Constipation

## 2025-04-26 NOTE — PROGRESS NOTE ADULT - SUBJECTIVE AND OBJECTIVE BOX
Cardiology PA Adult Progress Note    SUBJECTIVE ASSESSMENT:  Pt seen and examined at bedside this AM. Pt continues to endorse generalized body pain and feeling hot. Offers no other acute complaints & ROS otherwise negative.    MEDICATIONS:  losartan 50 milliGRAM(s) Oral daily  nafcillin  IVPB      nafcillin  IVPB 2 Gram(s) IV Intermittent every 4 hours  acetaminophen     Tablet .. 650 milliGRAM(s) Oral every 6 hours PRN  ALPRAZolam 0.25 milliGRAM(s) Oral every 8 hours PRN  oxyCODONE    IR 5 milliGRAM(s) Oral every 6 hours PRN  pantoprazole    Tablet 40 milliGRAM(s) Oral before breakfast  polyethylene glycol 3350 17 Gram(s) Oral two times a day PRN  senna 2 Tablet(s) Oral at bedtime  sodium chloride 0.9%. 1000 milliLiter(s) IV Continuous <Continuous>      VITAL SIGNS:  T(C): 37.7 (04-26-25 @ 16:37), Max: 37.7 (04-26-25 @ 08:25)  HR: 63 (04-26-25 @ 16:37) (54 - 66)  BP: 165/72 (04-26-25 @ 16:37) (124/57 - 191/79)  RR: 20 (04-26-25 @ 16:37) (16 - 20)  SpO2: 94% (04-26-25 @ 16:37) (94% - 99%)  Wt(kg): --    I&O's Summary    25 Apr 2025 07:01  -  26 Apr 2025 07:00  --------------------------------------------------------  IN: 1762 mL / OUT: 925 mL / NET: 837 mL    26 Apr 2025 07:01  -  26 Apr 2025 18:12  --------------------------------------------------------  IN: 460 mL / OUT: 950 mL / NET: -490 mL          PHYSICAL EXAM:  Appearance: NAD, pt lying in bed 	  HEENT: PERRL, EOMI	  Cardiovascular: Normal S1 S2, No murmurs, No JVD  Respiratory: Lungs clear to auscultation / No Rales, Rhonchi, Wheezing	  Gastrointestinal: + BS, Soft, Non-tender  Extremities: Normal range of motion, No edema  Vascular: Peripheral pulses palpable   Neurologic: Non-focal  Psychiatry: A & O x 3      LABS:	                           10.3   17.15 )-----------( 370      ( 26 Apr 2025 06:11 )             29.9     04-26    138  |  105  |  34[H]  ----------------------------<  142[H]  4.1   |  24  |  1.68[H]    Ca    7.4[L]      26 Apr 2025 06:11  Phos  2.4     04-25  Mg     2.2     04-26      proBNP:   Lipid Profile:   HgA1c:   TSH:   PT/INR - ( 25 Apr 2025 06:27 )   PT: 17.2 sec;   INR: 1.48          PTT - ( 26 Apr 2025 06:11 )  PTT:28.4 sec

## 2025-04-27 LAB
ALBUMIN SERPL ELPH-MCNC: 1.9 G/DL — LOW (ref 3.3–5)
ALP SERPL-CCNC: 89 U/L — SIGNIFICANT CHANGE UP (ref 40–120)
ALT FLD-CCNC: 25 U/L — SIGNIFICANT CHANGE UP (ref 10–45)
ANION GAP SERPL CALC-SCNC: 7 MMOL/L — SIGNIFICANT CHANGE UP (ref 5–17)
AST SERPL-CCNC: 41 U/L — HIGH (ref 10–40)
BASOPHILS # BLD AUTO: 0.03 K/UL — SIGNIFICANT CHANGE UP (ref 0–0.2)
BASOPHILS NFR BLD AUTO: 0.2 % — SIGNIFICANT CHANGE UP (ref 0–2)
BILIRUB SERPL-MCNC: 0.8 MG/DL — SIGNIFICANT CHANGE UP (ref 0.2–1.2)
BUN SERPL-MCNC: 34 MG/DL — HIGH (ref 7–23)
CALCIUM SERPL-MCNC: 7.5 MG/DL — LOW (ref 8.4–10.5)
CHLORIDE SERPL-SCNC: 104 MMOL/L — SIGNIFICANT CHANGE UP (ref 96–108)
CO2 SERPL-SCNC: 26 MMOL/L — SIGNIFICANT CHANGE UP (ref 22–31)
CREAT SERPL-MCNC: 1.98 MG/DL — HIGH (ref 0.5–1.3)
EGFR: 37 ML/MIN/1.73M2 — LOW
EGFR: 37 ML/MIN/1.73M2 — LOW
EOSINOPHIL # BLD AUTO: 0.17 K/UL — SIGNIFICANT CHANGE UP (ref 0–0.5)
EOSINOPHIL NFR BLD AUTO: 1.1 % — SIGNIFICANT CHANGE UP (ref 0–6)
GLUCOSE SERPL-MCNC: 133 MG/DL — HIGH (ref 70–99)
GRAM STN FLD: ABNORMAL
HCT VFR BLD CALC: 28 % — LOW (ref 39–50)
HGB BLD-MCNC: 9.3 G/DL — LOW (ref 13–17)
IMM GRANULOCYTES NFR BLD AUTO: 1.4 % — HIGH (ref 0–0.9)
LYMPHOCYTES # BLD AUTO: 1.07 K/UL — SIGNIFICANT CHANGE UP (ref 1–3.3)
LYMPHOCYTES # BLD AUTO: 7.2 % — LOW (ref 13–44)
MAGNESIUM SERPL-MCNC: 2.3 MG/DL — SIGNIFICANT CHANGE UP (ref 1.6–2.6)
MCHC RBC-ENTMCNC: 31.4 PG — SIGNIFICANT CHANGE UP (ref 27–34)
MCHC RBC-ENTMCNC: 33.2 G/DL — SIGNIFICANT CHANGE UP (ref 32–36)
MCV RBC AUTO: 94.6 FL — SIGNIFICANT CHANGE UP (ref 80–100)
METHOD TYPE: SIGNIFICANT CHANGE UP
MONOCYTES # BLD AUTO: 1.05 K/UL — HIGH (ref 0–0.9)
MONOCYTES NFR BLD AUTO: 7.1 % — SIGNIFICANT CHANGE UP (ref 2–14)
MSSA DNA SPEC QL NAA+PROBE: SIGNIFICANT CHANGE UP
NEUTROPHILS # BLD AUTO: 12.35 K/UL — HIGH (ref 1.8–7.4)
NEUTROPHILS NFR BLD AUTO: 83 % — HIGH (ref 43–77)
NRBC BLD AUTO-RTO: 0 /100 WBCS — SIGNIFICANT CHANGE UP (ref 0–0)
PLATELET # BLD AUTO: 443 K/UL — HIGH (ref 150–400)
POTASSIUM SERPL-MCNC: 4 MMOL/L — SIGNIFICANT CHANGE UP (ref 3.5–5.3)
POTASSIUM SERPL-SCNC: 4 MMOL/L — SIGNIFICANT CHANGE UP (ref 3.5–5.3)
PROT SERPL-MCNC: 5.2 G/DL — LOW (ref 6–8.3)
RBC # BLD: 2.96 M/UL — LOW (ref 4.2–5.8)
RBC # FLD: 14.7 % — HIGH (ref 10.3–14.5)
SODIUM SERPL-SCNC: 137 MMOL/L — SIGNIFICANT CHANGE UP (ref 135–145)
WBC # BLD: 14.88 K/UL — HIGH (ref 3.8–10.5)
WBC # FLD AUTO: 14.88 K/UL — HIGH (ref 3.8–10.5)

## 2025-04-27 PROCEDURE — 99233 SBSQ HOSP IP/OBS HIGH 50: CPT

## 2025-04-27 PROCEDURE — 99232 SBSQ HOSP IP/OBS MODERATE 35: CPT

## 2025-04-27 PROCEDURE — 71046 X-RAY EXAM CHEST 2 VIEWS: CPT | Mod: 26

## 2025-04-27 RX ORDER — ACETAMINOPHEN 500 MG/5ML
650 LIQUID (ML) ORAL ONCE
Refills: 0 | Status: COMPLETED | OUTPATIENT
Start: 2025-04-27 | End: 2025-04-27

## 2025-04-27 RX ADMIN — Medication 650 MILLIGRAM(S): at 23:46

## 2025-04-27 RX ADMIN — APIXABAN 5 MILLIGRAM(S): 2.5 TABLET, FILM COATED ORAL at 05:37

## 2025-04-27 RX ADMIN — Medication 650 MILLIGRAM(S): at 16:33

## 2025-04-27 RX ADMIN — Medication 650 MILLIGRAM(S): at 17:59

## 2025-04-27 RX ADMIN — Medication 650 MILLIGRAM(S): at 10:35

## 2025-04-27 RX ADMIN — Medication 650 MILLIGRAM(S): at 22:46

## 2025-04-27 RX ADMIN — NAFCILLIN 200 GRAM(S): 2 INJECTION, SOLUTION INTRAVENOUS at 20:42

## 2025-04-27 RX ADMIN — LOSARTAN POTASSIUM 50 MILLIGRAM(S): 100 TABLET, FILM COATED ORAL at 05:37

## 2025-04-27 RX ADMIN — NAFCILLIN 200 GRAM(S): 2 INJECTION, SOLUTION INTRAVENOUS at 00:35

## 2025-04-27 RX ADMIN — Medication 650 MILLIGRAM(S): at 01:04

## 2025-04-27 RX ADMIN — APIXABAN 5 MILLIGRAM(S): 2.5 TABLET, FILM COATED ORAL at 18:43

## 2025-04-27 RX ADMIN — CLONAZEPAM 0.5 MILLIGRAM(S): 0.5 TABLET ORAL at 22:08

## 2025-04-27 RX ADMIN — NAFCILLIN 200 GRAM(S): 2 INJECTION, SOLUTION INTRAVENOUS at 04:31

## 2025-04-27 RX ADMIN — NAFCILLIN 200 GRAM(S): 2 INJECTION, SOLUTION INTRAVENOUS at 16:26

## 2025-04-27 RX ADMIN — Medication 40 MILLIGRAM(S): at 05:37

## 2025-04-27 RX ADMIN — Medication 650 MILLIGRAM(S): at 02:04

## 2025-04-27 RX ADMIN — Medication 650 MILLIGRAM(S): at 09:32

## 2025-04-27 RX ADMIN — NAFCILLIN 200 GRAM(S): 2 INJECTION, SOLUTION INTRAVENOUS at 08:27

## 2025-04-27 RX ADMIN — NAFCILLIN 200 GRAM(S): 2 INJECTION, SOLUTION INTRAVENOUS at 12:31

## 2025-04-27 NOTE — CONSULT NOTE ADULT - SUBJECTIVE AND OBJECTIVE BOX
HCM Consult    HPI:  64M oHCM (MWT 18, +ABDULLAHI with chordal thickening, RG 50, , mild MR) and 2:1 AVB s/p dual-chamber PPM 1/2025 admitted for persistent MSSA bacteremia. Recent dental procedure, root canal and crown implant, no PPx abx. Then developed constitutional symptoms and low-grade fevers, admitted initially to [facility]. Workup there with persistent MSSA bacteremia, DONNY there reportedly with no vegetation on valves or device leads; images reviewed by primary team here. Transferred to [facility] and underwent uncomplicated device extraction of dual chamber PPM and MICRA implant given underlying CHB. Remains with underlying CHB, V-paced via MICRA. Patient reports medically feeling well, but depressive symptoms that he attributes primary to debility and deconditioning.  Of note, mavacamten recently delivered to him, but has not yet initiated therapy.      Echo: 1/22/25: CONCLUSIONS:  1. Left ventricular cavity is normal in size. Left ventricular wall thickness is normal. Left ventricular systolic function is hyperdynamic with an ejection fraction visually estimated at >75 %. There are no regional wall motion abnormalities seen.  2. Mild to moderate left ventricular hypertrophy.  3. Normal right ventricular cavity size, with normal wall thickness, and normal right ventricular systolic function.  4. Left atrium is severely dilated.  5. Severe LVOT obstruction as a result of systolic anterior motion (ABDULLAHI) of the anterior leaflet. Peak gradient at rest 50mmHg, increasing with valsalva maneuver to 116mmHg. Likely no significant aortic stenosis.  6. Moderate to severe posteriorly directed mitral regurgitation due to ABDULLAHI. Mild diastolic mitral regurgitation noted due bradyarrhythmia.  7. Pulmonary artery systolic pressure could not be estimated.  8. No pericardial effusion seen.  9. Significant bradycardia noted with 2:1 AV block.  10. No prior study for comparison.

## 2025-04-27 NOTE — PROGRESS NOTE ADULT - SUBJECTIVE AND OBJECTIVE BOX
OVERNIGHT EVENTS: DEANNE    SUBJECTIVE / INTERVAL HPI: Patient seen and examined at bedside. No complaints this morning. Wife at bedside requesting transfer to outside hospital. States she is not happy with care here. Wants to be discharged by tomorrow.     VITAL SIGNS:  Vital Signs Last 24 Hrs  T(C): 37 (27 Apr 2025 04:31), Max: 38.2 (27 Apr 2025 00:33)  T(F): 98.6 (27 Apr 2025 04:31), Max: 100.7 (27 Apr 2025 00:33)  HR: 60 (27 Apr 2025 04:31) (52 - 63)  BP: 126/60 (27 Apr 2025 04:31) (126/60 - 165/72)  BP(mean): 86 (27 Apr 2025 04:31) (86 - 116)  RR: 18 (27 Apr 2025 04:31) (18 - 20)  SpO2: 95% (27 Apr 2025 04:31) (92% - 95%)    Parameters below as of 27 Apr 2025 04:31  Patient On (Oxygen Delivery Method): room air      I&O's Summary    26 Apr 2025 07:01  -  27 Apr 2025 07:00  --------------------------------------------------------  IN: 460 mL / OUT: 1550 mL / NET: -1090 mL        PHYSICAL EXAM:    Appearance: NAD, pt lying in bed 	  HEENT: PERRL, EOMI	  Cardiovascular: Normal S1 S2, No murmurs, No JVD  Respiratory: Lungs clear to auscultation / No Rales, Rhonchi, Wheezing	  Gastrointestinal: + BS, Soft, Non-tender  Extremities: Normal range of motion, No edema  Vascular: Peripheral pulses palpable   Neurologic: Non-focal  Psychiatry: A & O x 3      MEDICATIONS:  MEDICATIONS  (STANDING):  apixaban 5 milliGRAM(s) Oral every 12 hours  clonazePAM  Tablet 0.5 milliGRAM(s) Oral at bedtime  losartan 50 milliGRAM(s) Oral daily  nafcillin  IVPB      nafcillin  IVPB 2 Gram(s) IV Intermittent every 4 hours  pantoprazole    Tablet 40 milliGRAM(s) Oral before breakfast  senna 2 Tablet(s) Oral at bedtime  sodium chloride 0.9%. 1000 milliLiter(s) (100 mL/Hr) IV Continuous <Continuous>    MEDICATIONS  (PRN):  acetaminophen     Tablet .. 650 milliGRAM(s) Oral every 6 hours PRN Temp greater or equal to 38C (100.4F), Mild Pain (1 - 3), Moderate Pain (4 - 6)  ALPRAZolam 0.25 milliGRAM(s) Oral every 8 hours PRN anxiety  oxyCODONE    IR 5 milliGRAM(s) Oral every 6 hours PRN Severe Pain (7 - 10)  polyethylene glycol 3350 17 Gram(s) Oral two times a day PRN Constipation      ALLERGIES:  Allergies    No Known Allergies    Intolerances        LABS:                        9.3    14.88 )-----------( 443      ( 27 Apr 2025 05:30 )             28.0     04-27    137  |  104  |  34[H]  ----------------------------<  133[H]  4.0   |  26  |  1.98[H]    Ca    7.5[L]      27 Apr 2025 05:30  Mg     2.3     04-27    TPro  5.2[L]  /  Alb  1.9[L]  /  TBili  0.8  /  DBili  x   /  AST  41[H]  /  ALT  25  /  AlkPhos  89  04-27    PTT - ( 26 Apr 2025 06:11 )  PTT:28.4 sec  Urinalysis Basic - ( 27 Apr 2025 05:30 )    Color: x / Appearance: x / SG: x / pH: x  Gluc: 133 mg/dL / Ketone: x  / Bili: x / Urobili: x   Blood: x / Protein: x / Nitrite: x   Leuk Esterase: x / RBC: x / WBC x   Sq Epi: x / Non Sq Epi: x / Bacteria: x      CAPILLARY BLOOD GLUCOSE          RADIOLOGY & ADDITIONAL TESTS: Reviewed.   OVERNIGHT EVENTS: Febrile 100.7, received Tylenol    SUBJECTIVE / INTERVAL HPI: Patient seen and examined at bedside. No complaints this morning. Wife at bedside requesting transfer to outside hospital. States she is not happy with care here. Wants to be discharged by tomorrow.     VITAL SIGNS:  Vital Signs Last 24 Hrs  T(C): 37 (27 Apr 2025 04:31), Max: 38.2 (27 Apr 2025 00:33)  T(F): 98.6 (27 Apr 2025 04:31), Max: 100.7 (27 Apr 2025 00:33)  HR: 60 (27 Apr 2025 04:31) (52 - 63)  BP: 126/60 (27 Apr 2025 04:31) (126/60 - 165/72)  BP(mean): 86 (27 Apr 2025 04:31) (86 - 116)  RR: 18 (27 Apr 2025 04:31) (18 - 20)  SpO2: 95% (27 Apr 2025 04:31) (92% - 95%)    Parameters below as of 27 Apr 2025 04:31  Patient On (Oxygen Delivery Method): room air      I&O's Summary    26 Apr 2025 07:01  -  27 Apr 2025 07:00  --------------------------------------------------------  IN: 460 mL / OUT: 1550 mL / NET: -1090 mL        PHYSICAL EXAM:    Appearance: NAD, pt lying in bed 	  HEENT: PERRL, EOMI	  Cardiovascular: Normal S1 S2, No murmurs, No JVD  Respiratory: Lungs clear to auscultation / No Rales, Rhonchi, Wheezing	  Gastrointestinal: + BS, Soft, Non-tender  Extremities: Normal range of motion, No edema  Vascular: Peripheral pulses palpable   Neurologic: Non-focal  Psychiatry: A & O x 3      MEDICATIONS:  MEDICATIONS  (STANDING):  apixaban 5 milliGRAM(s) Oral every 12 hours  clonazePAM  Tablet 0.5 milliGRAM(s) Oral at bedtime  losartan 50 milliGRAM(s) Oral daily  nafcillin  IVPB      nafcillin  IVPB 2 Gram(s) IV Intermittent every 4 hours  pantoprazole    Tablet 40 milliGRAM(s) Oral before breakfast  senna 2 Tablet(s) Oral at bedtime  sodium chloride 0.9%. 1000 milliLiter(s) (100 mL/Hr) IV Continuous <Continuous>    MEDICATIONS  (PRN):  acetaminophen     Tablet .. 650 milliGRAM(s) Oral every 6 hours PRN Temp greater or equal to 38C (100.4F), Mild Pain (1 - 3), Moderate Pain (4 - 6)  ALPRAZolam 0.25 milliGRAM(s) Oral every 8 hours PRN anxiety  oxyCODONE    IR 5 milliGRAM(s) Oral every 6 hours PRN Severe Pain (7 - 10)  polyethylene glycol 3350 17 Gram(s) Oral two times a day PRN Constipation      ALLERGIES:  Allergies    No Known Allergies    Intolerances        LABS:                        9.3    14.88 )-----------( 443      ( 27 Apr 2025 05:30 )             28.0     04-27    137  |  104  |  34[H]  ----------------------------<  133[H]  4.0   |  26  |  1.98[H]    Ca    7.5[L]      27 Apr 2025 05:30  Mg     2.3     04-27    TPro  5.2[L]  /  Alb  1.9[L]  /  TBili  0.8  /  DBili  x   /  AST  41[H]  /  ALT  25  /  AlkPhos  89  04-27    PTT - ( 26 Apr 2025 06:11 )  PTT:28.4 sec  Urinalysis Basic - ( 27 Apr 2025 05:30 )    Color: x / Appearance: x / SG: x / pH: x  Gluc: 133 mg/dL / Ketone: x  / Bili: x / Urobili: x   Blood: x / Protein: x / Nitrite: x   Leuk Esterase: x / RBC: x / WBC x   Sq Epi: x / Non Sq Epi: x / Bacteria: x      CAPILLARY BLOOD GLUCOSE          RADIOLOGY & ADDITIONAL TESTS: Reviewed.

## 2025-04-27 NOTE — PROGRESS NOTE ADULT - ASSESSMENT
64M h/o HoCM, CHB s/p PPM placement 1/025, Afib, HTN p/w fever bodyache fatigue at [facility], found to have MSSA bacteremia and c/f PPM infection, transferred to [facility].  PPM extracted on 4/24/25, now has leadless Micra device.  Still bacteremic as of 4/25.  Awaiting gallium scan.      - cont nafcillin 2g IV q4h  - daily BCx until bacteremia clears >72h  - Gallium scan tomorrow  - f/u PPM culture from 4/25      Team 2 will follow you.  Dr Mustafa will resume care on Monday.  Case d/w primary team.    Vilma Mullins MD, MS  Infectious Disease attending  office phone [phone]  For any questions during evening/weekend/holiday, please page ID on call

## 2025-04-27 NOTE — PHYSICAL THERAPY INITIAL EVALUATION ADULT - GENERAL OBSERVATIONS, REHAB EVAL
Patient encountered semi-supine in bed in no apparent distress, +IV +tele +pulsOx +wife at bedside. OT John Paul present throughout session. PT IE completed. Patient recieved semi-supine in bed in no apparent distress, +IV +Tele +pulseOx +constant supervision +L chest dressing clean/dry/intact. Agreeable to PT. MERI Coker present throughout session.

## 2025-04-27 NOTE — PHYSICAL THERAPY INITIAL EVALUATION ADULT - ADDITIONAL COMMENTS
Patient lives with wife in ranch-styled home +6STE. PTA, pt was independent with all ADLs and functional mobility, without the use of any AD. Patient has a tub and walk-in shower. Patient owns the following DME: wheelchair, rolling walker, grab bars. Patient goes to gym 7 days/week.

## 2025-04-27 NOTE — OCCUPATIONAL THERAPY INITIAL EVALUATION ADULT - GENERAL OBSERVATIONS, REHAB EVAL
Pt received semi-supine in bed, +1:1, +IV, +(L) chest dressing over incision C/D/I in NAD and agreeable to OT. Wife at bedside. Cleared by RN RAFAEL to see. PT Savannah present.

## 2025-04-27 NOTE — PHYSICAL THERAPY INITIAL EVALUATION ADULT - PERTINENT HX OF CURRENT PROBLEM, REHAB EVAL
64M w/ PMHx of HTN, HLD, HCM (follows w/ Dr. Sapp), pAFib (on Eliquis, last dose 4/23 PM), hx of CHB (s/p PPM 1/2025) who initially presented to [facility] with fevers, body aches, fatigue. Pt found to be septic and w/ BCx/UCx (+) for MSSA. Of note, patient had root canal 2wks prior to presentation. Per verbal report to RN (documentation was not transferred with the patient), TTE was concerning for aortic valve vegetation but subsequent DONNY was negative. At this time concern that infection source is patient's PPM, now s/p PPM exaction and Micra placement.

## 2025-04-27 NOTE — OCCUPATIONAL THERAPY INITIAL EVALUATION ADULT - DIAGNOSIS, OT EVAL
Pt admitted for PPM infection now s/p PPM exaction and MICRA placement. Pt presents with mildly impaired balance and decreased activity tolerance impacting overall ease of completing ADLs and functional mobility tasks at OF.

## 2025-04-27 NOTE — PROGRESS NOTE ADULT - PROBLEM SELECTOR PLAN 2
RN informed provider that  told her that pt wanted to "end it all" and wants to "take 4 oxycodone and not wake up"  - Provider went to assess pt  - Pt states he is unhappy in this hospital. States he is unable to walk now and just a few weeks ago he was able to run 8 miles. Additionally states he is unable to do the things he used to do, such as feeding himself and opening a bottle of water. He further states that he feels he should "go to sleep and not wake up" and feels that "everything should end."  - 1:1 was ordered  - Psych was consulted, pending recs  - Pt's room was changed so he would have a window view  - PT/OT consulted  - Family was updated at bedside and agreed with the plan (1:1, Psych consult, room change, PT/OT).    ##Pt preference to transfer to [facility]  - Pt's family requested transfer to [facility] because they were unhappy with his room   - Per the transfer center, pt would need insurance auth that typically takes 24 hours, pt will have a high co-pay as well as be responsible for the cost of the ambulance   - After further discussion with pt and family, they decided he will stay until Monday for the Gallium scan

## 2025-04-27 NOTE — PROGRESS NOTE ADULT - SUBJECTIVE AND OBJECTIVE BOX
INFECTIOUS DISEASES CONSULT FOLLOW-UP NOTE    INTERVAL HPI/OVERNIGHT EVENTS:  no event overnight  patient and wife asking for transfer plan status   requesting to be transferred to [facility]  patient reports anxiety   pain improving    ROS:   Constitutional, eyes, ENT, cardiovascular, respiratory, gastrointestinal, genitourinary, integumentary, neurological, psychiatric and heme/lymph are otherwise negative other than noted above       ANTIBIOTICS/RELEVANT:    MEDICATIONS  (STANDING):  apixaban 5 milliGRAM(s) Oral every 12 hours  clonazePAM  Tablet 0.5 milliGRAM(s) Oral at bedtime  nafcillin  IVPB      nafcillin  IVPB 2 Gram(s) IV Intermittent every 4 hours  pantoprazole    Tablet 40 milliGRAM(s) Oral before breakfast  senna 2 Tablet(s) Oral at bedtime  sodium chloride 0.9%. 1000 milliLiter(s) (100 mL/Hr) IV Continuous <Continuous>    MEDICATIONS  (PRN):  acetaminophen     Tablet .. 650 milliGRAM(s) Oral every 6 hours PRN Temp greater or equal to 38C (100.4F), Mild Pain (1 - 3), Moderate Pain (4 - 6)  ALPRAZolam 0.25 milliGRAM(s) Oral every 8 hours PRN anxiety  oxyCODONE    IR 5 milliGRAM(s) Oral every 6 hours PRN Severe Pain (7 - 10)  polyethylene glycol 3350 17 Gram(s) Oral two times a day PRN Constipation        Vital Signs Last 24 Hrs  T(C): 37.3 (27 Apr 2025 08:45), Max: 38.2 (27 Apr 2025 00:33)  T(F): 99.1 (27 Apr 2025 08:45), Max: 100.7 (27 Apr 2025 00:33)  HR: 69 (27 Apr 2025 08:45) (52 - 69)  BP: 117/70 (27 Apr 2025 08:45) (117/70 - 165/72)  BP(mean): 85 (27 Apr 2025 08:45) (85 - 103)  RR: 19 (27 Apr 2025 08:45) (18 - 20)  SpO2: 96% (27 Apr 2025 08:45) (92% - 96%)    Parameters below as of 27 Apr 2025 08:45  Patient On (Oxygen Delivery Method): room air        04-26-25 @ 07:01  -  04-27-25 @ 07:00  --------------------------------------------------------  IN: 460 mL / OUT: 1550 mL / NET: -1090 mL    04-27-25 @ 07:01  -  04-27-25 @ 15:11  --------------------------------------------------------  IN: 500 mL / OUT: 600 mL / NET: -100 mL      PHYSICAL EXAM:  Constitutional: alert, NAD  Eyes: the sclera and conjunctiva were normal.   ENT: the ears and nose were normal in appearance.   Neck: the appearance of the neck was normal and the neck was supple.   chest L upper chest with bandage   Pulmonary: no respiratory distress and lungs were clear to auscultation bilaterally.   Heart: heart rate was normal and rhythm regular, normal S1 and S2  Abdomen: normal bowel sounds, soft, non-tender          LABS:                        9.3    14.88 )-----------( 443      ( 27 Apr 2025 05:30 )             28.0     04-27    137  |  104  |  34[H]  ----------------------------<  133[H]  4.0   |  26  |  1.98[H]    Ca    7.5[L]      27 Apr 2025 05:30  Mg     2.3     04-27    TPro  5.2[L]  /  Alb  1.9[L]  /  TBili  0.8  /  DBili  x   /  AST  41[H]  /  ALT  25  /  AlkPhos  89  04-27    PTT - ( 26 Apr 2025 06:11 )  PTT:28.4 sec  Urinalysis Basic - ( 27 Apr 2025 05:30 )    Color: x / Appearance: x / SG: x / pH: x  Gluc: 133 mg/dL / Ketone: x  / Bili: x / Urobili: x   Blood: x / Protein: x / Nitrite: x   Leuk Esterase: x / RBC: x / WBC x   Sq Epi: x / Non Sq Epi: x / Bacteria: x        MICROBIOLOGY:      RADIOLOGY & ADDITIONAL STUDIES:  Reviewed

## 2025-04-27 NOTE — PHYSICAL THERAPY INITIAL EVALUATION ADULT - MANUAL MUSCLE TESTING RESULTS, REHAB EVAL
grossly assessed due to  LUE pain 2/2 PPM extraction. RUE and BLE ~5/5 at all major pivot points. LUE >3+/5 based on functional mobility against gravity

## 2025-04-27 NOTE — PROGRESS NOTE ADULT - ASSESSMENT
64M w/ PMHx of HTN, HLD, HCM (follows w/ Dr. Sapp), pAFib (on Eliquis, last dose 4/23 PM), hx of CHB (s/p PPM 1/2025) who initially presented to [facility] with fevers, body aches, fatigue. Pt found to be septic and w/ BCx/UCx (+) for MSSA. Of note, patient had root canal 2wks prior to presentation. Per verbal report to RN (documentation was not transferred with the patient), TTE was concerning for aortic valve vegetation but subsequent DONNY was negative. At this time concern that infection source is patient's PPM, now s/p PPM exaction and Micra placement.

## 2025-04-27 NOTE — OCCUPATIONAL THERAPY INITIAL EVALUATION ADULT - ADDITIONAL COMMENTS
Pt lives with his wife in a ranch style home ~6 steps total. Prior to admit, pt independent with ADLs/IADLs and mobility, denies use of AD or DME and was going to the gym 7day/week. Pt has both a tub/shower and walk-in shower with grab bars. Pt also owns a w/c, RW, and raised toilet seat. Pt is L hand dominant.

## 2025-04-27 NOTE — PROGRESS NOTE ADULT - PROBLEM SELECTOR PLAN 6
Hx of AFib (was noted on PPM that was placed recently)  - s/p Heparin gtt  - Per Dr. Angulo, restart Eliquis 4/27/25      F: none  E: Replete K < 4, Mg < 2  N: DASH/TLC  DVTppx: restart Eliquis 2/27/25  GIppx: Pantoprazole  PT/OT: consulted    Dispo: pending clinical progression    Case discussed w/ Dr. Lazcano

## 2025-04-27 NOTE — PROGRESS NOTE ADULT - PROBLEM SELECTOR PLAN 3
- Pt has HCM, follows w/ Dr. Bib Sapp.   - TTE (3/7/25): EF 65%, LVH, LVOT (gradient 15mmHg at rest, 88mmHg w/ valsalva), ABDULLAHI of AMVL and chordal apparatus, mild LA/RA dilation, trace MR, aortic stenosis (MG 10, PG 20), small pericardial effusion w/o tamponade.  - TTE (4/24/25):  LVEF 60 %. No RWMA. LA severely dilated. Mild to mod MR. Fibrocalcific aortic valve sclerosis without stenosis. Turbulent flow is seen in the LVOT. Transaortic gradients higher than expected for a fibrocalcific aortic valve. This could be due to chordal systolic anterior motion of the mitral valve, seen on some images in this study. Subaortic membrane cannot be conclusively ruled out. Suggest DONNY to better visualize the LVOT.  PASP 22 mmHg. - Pt has HCM, follows w/ Dr. Bib Sapp.   - TTE (3/7/25): EF 65%, LVH, LVOT (gradient 15mmHg at rest, 88mmHg w/ valsalva), ABDULLAHI of AMVL and chordal apparatus, mild LA/RA dilation, trace MR, aortic stenosis (MG 10, PG 20), small pericardial effusion w/o tamponade.  - TTE (4/24/25):  LVEF 60 %. No RWMA. LA severely dilated. Mild to mod MR. Fibrocalcific aortic valve sclerosis without stenosis. Turbulent flow is seen in the LVOT. Transaortic gradients higher than expected for a fibrocalcific aortic valve. This could be due to chordal systolic anterior motion of the mitral valve, seen on some images in this study. Subaortic membrane cannot be conclusively ruled out. Suggest DONNY to better visualize the LVOT.  PASP 22 mmHg.  - no BB/CCB given prior intolerance and AV block  - hold mavacamten until at least next outpt visit

## 2025-04-27 NOTE — OCCUPATIONAL THERAPY INITIAL EVALUATION ADULT - PERTINENT HX OF CURRENT PROBLEM, REHAB EVAL
64M w/ PMHx of HTN, HLD, HCM (follows w/ Dr. Sapp), pAFib (on Eliquis, last dose 4/23 PM), hx of CHB (s/p PPM 1/2025) who initially presented to [facility] with fevers, body aches, fatigue. Pt found to be septic and w/ BCx/UCx (+) for MSSA. Of note, patient had root canal 2wks prior to presentation. Per verbal report to RN (documentation was not transferred with the patient), TTE was concerning for aortic valve vegetation but subsequent DONNY was negative. At this time concern that infection source is patient's PPM, now s/p PPM exaction and Micra placement.

## 2025-04-27 NOTE — OCCUPATIONAL THERAPY INITIAL EVALUATION ADULT - LOWER BODY DRESSING, PREVIOUS LEVEL OF FUNCTION, OT EVAL
independent Complex Repair And Rotation Flap Text: The defect edges were debeveled with a #15 scalpel blade.  The primary defect was closed partially with a complex linear closure.  Given the location of the remaining defect, shape of the defect and the proximity to free margins a rotation flap was deemed most appropriate for complete closure of the defect.  Using a sterile surgical marker, an appropriate advancement flap was drawn incorporating the defect and placing the expected incisions within the relaxed skin tension lines where possible.    The area thus outlined was incised deep to adipose tissue with a #15 scalpel blade.  The skin margins were undermined to an appropriate distance in all directions utilizing iris scissors.

## 2025-04-27 NOTE — OCCUPATIONAL THERAPY INITIAL EVALUATION ADULT - MANUAL MUSCLE TESTING RESULTS, REHAB EVAL
LUE pain 2/2 PPM extraction. RUE and BLE ~5/5 at all major pivot points. LUE >3+/5 based on functional mobility against gravity/grossly assessed due to

## 2025-04-27 NOTE — PROGRESS NOTE ADULT - PROBLEM SELECTOR PLAN 1
Presented to [facility] 4/17 w/ fever/chills/fatigue, found to be septic w/ BCx/UCx (+) for MSSA  - Per verbal report (documentation not transferred with patient), TTE was concerning for aortic valve vegetation, but DONNY was negative for vegetation   - 2wks prior to presentation pt had root canal  - Concern that PPM could be source of infection s/p PPM extraction and Micra placement   - c/w Nafcillin 2g Q4H and daily blood culture   - f/u gallium scan (hx of titanium screws in C-Spine) --> pt injected with isotope on 4/26/25 and will be scanned on 4/28/25  - EP following (known to Dr. Rivero), appreciate recs   - ID following, appreciate recs  - Blood cultures + from 4/25 will send surveillance cultures

## 2025-04-27 NOTE — CONSULT NOTE ADULT - ASSESSMENT
64M with oHCM and 2:1 AVB s/p dual-chamber PPM 1/2025 here with persistent MSSA bacteremia now s/p device extraction and MICRA implant. Uncomplicated procedure, recovering well. He appears significantly deconditioned from his baseline and is reporting passive suicidal ideation attributed to this.    Recommendations:  - no BB/CCB given prior intolerance and AV block  - hold mavacamten until at least next outpt visit  - has tolerated ARB despite vasodilatory effect, ok to continue  - PT consult  - pt prefers to go home with MICRA for now and subsequent dual-chamber implant as outpatient  - when ready for discharge, please set up HCM follow-up with me and EP follow-up with Gerardo Rivero

## 2025-04-27 NOTE — OCCUPATIONAL THERAPY INITIAL EVALUATION ADULT - NAME OF CLINICIAN
-Continue pain management  -Encourage OOB and ambulation  -take iron, folic acid, vitamin C, and prenatal vitamins. eat iron fortified food   -Continue current care  -Plan for discharge tomorrow  -d/w dr George BART HALL

## 2025-04-28 PROBLEM — E78.5 HYPERLIPIDEMIA, UNSPECIFIED: Chronic | Status: ACTIVE | Noted: 2025-04-25

## 2025-04-28 PROBLEM — I10 ESSENTIAL (PRIMARY) HYPERTENSION: Chronic | Status: ACTIVE | Noted: 2025-04-25

## 2025-04-28 PROBLEM — I42.2 OTHER HYPERTROPHIC CARDIOMYOPATHY: Chronic | Status: ACTIVE | Noted: 2025-04-25

## 2025-04-28 LAB
-  CLINDAMYCIN: SIGNIFICANT CHANGE UP
-  ERYTHROMYCIN: SIGNIFICANT CHANGE UP
-  GENTAMICIN: SIGNIFICANT CHANGE UP
-  OXACILLIN: SIGNIFICANT CHANGE UP
-  PENICILLIN: SIGNIFICANT CHANGE UP
-  RIFAMPIN: SIGNIFICANT CHANGE UP
-  TETRACYCLINE: SIGNIFICANT CHANGE UP
-  TRIMETHOPRIM/SULFAMETHOXAZOLE: SIGNIFICANT CHANGE UP
-  VANCOMYCIN: SIGNIFICANT CHANGE UP
ALBUMIN SERPL ELPH-MCNC: 1.9 G/DL — LOW (ref 3.3–5)
ALP SERPL-CCNC: 83 U/L — SIGNIFICANT CHANGE UP (ref 40–120)
ALT FLD-CCNC: 28 U/L — SIGNIFICANT CHANGE UP (ref 10–45)
ANION GAP SERPL CALC-SCNC: 9 MMOL/L — SIGNIFICANT CHANGE UP (ref 5–17)
AST SERPL-CCNC: 40 U/L — SIGNIFICANT CHANGE UP (ref 10–40)
BASOPHILS # BLD AUTO: 0.05 K/UL — SIGNIFICANT CHANGE UP (ref 0–0.2)
BASOPHILS NFR BLD AUTO: 0.4 % — SIGNIFICANT CHANGE UP (ref 0–2)
BILIRUB SERPL-MCNC: 0.6 MG/DL — SIGNIFICANT CHANGE UP (ref 0.2–1.2)
BUN SERPL-MCNC: 33 MG/DL — HIGH (ref 7–23)
CALCIUM SERPL-MCNC: 7.4 MG/DL — LOW (ref 8.4–10.5)
CHLORIDE SERPL-SCNC: 98 MMOL/L — SIGNIFICANT CHANGE UP (ref 96–108)
CO2 SERPL-SCNC: 26 MMOL/L — SIGNIFICANT CHANGE UP (ref 22–31)
CREAT SERPL-MCNC: 1.87 MG/DL — HIGH (ref 0.5–1.3)
CULTURE RESULTS: ABNORMAL
CULTURE RESULTS: ABNORMAL
EGFR: 40 ML/MIN/1.73M2 — LOW
EGFR: 40 ML/MIN/1.73M2 — LOW
EOSINOPHIL # BLD AUTO: 0.19 K/UL — SIGNIFICANT CHANGE UP (ref 0–0.5)
EOSINOPHIL NFR BLD AUTO: 1.4 % — SIGNIFICANT CHANGE UP (ref 0–6)
GLUCOSE SERPL-MCNC: 122 MG/DL — HIGH (ref 70–99)
GRAM STN FLD: ABNORMAL
GRAM STN FLD: ABNORMAL
HCT VFR BLD CALC: 27.1 % — LOW (ref 39–50)
HGB BLD-MCNC: 9.2 G/DL — LOW (ref 13–17)
IMM GRANULOCYTES NFR BLD AUTO: 1.2 % — HIGH (ref 0–0.9)
LYMPHOCYTES # BLD AUTO: 0.95 K/UL — LOW (ref 1–3.3)
LYMPHOCYTES # BLD AUTO: 6.8 % — LOW (ref 13–44)
MAGNESIUM SERPL-MCNC: 2.3 MG/DL — SIGNIFICANT CHANGE UP (ref 1.6–2.6)
MCHC RBC-ENTMCNC: 31.8 PG — SIGNIFICANT CHANGE UP (ref 27–34)
MCHC RBC-ENTMCNC: 33.9 G/DL — SIGNIFICANT CHANGE UP (ref 32–36)
MCV RBC AUTO: 93.8 FL — SIGNIFICANT CHANGE UP (ref 80–100)
METHOD TYPE: SIGNIFICANT CHANGE UP
MONOCYTES # BLD AUTO: 1.19 K/UL — HIGH (ref 0–0.9)
MONOCYTES NFR BLD AUTO: 8.5 % — SIGNIFICANT CHANGE UP (ref 2–14)
NEUTROPHILS # BLD AUTO: 11.42 K/UL — HIGH (ref 1.8–7.4)
NEUTROPHILS NFR BLD AUTO: 81.7 % — HIGH (ref 43–77)
NRBC BLD AUTO-RTO: 0 /100 WBCS — SIGNIFICANT CHANGE UP (ref 0–0)
ORGANISM # SPEC MICROSCOPIC CNT: ABNORMAL
ORGANISM # SPEC MICROSCOPIC CNT: ABNORMAL
ORGANISM # SPEC MICROSCOPIC CNT: SIGNIFICANT CHANGE UP
PHOSPHATE SERPL-MCNC: 3.2 MG/DL — SIGNIFICANT CHANGE UP (ref 2.5–4.5)
PLATELET # BLD AUTO: 521 K/UL — HIGH (ref 150–400)
POTASSIUM SERPL-MCNC: 4 MMOL/L — SIGNIFICANT CHANGE UP (ref 3.5–5.3)
POTASSIUM SERPL-SCNC: 4 MMOL/L — SIGNIFICANT CHANGE UP (ref 3.5–5.3)
PROT SERPL-MCNC: 5.6 G/DL — LOW (ref 6–8.3)
RBC # BLD: 2.89 M/UL — LOW (ref 4.2–5.8)
RBC # FLD: 14.5 % — SIGNIFICANT CHANGE UP (ref 10.3–14.5)
SODIUM SERPL-SCNC: 133 MMOL/L — LOW (ref 135–145)
SPECIMEN SOURCE: SIGNIFICANT CHANGE UP
SPECIMEN SOURCE: SIGNIFICANT CHANGE UP
WBC # BLD: 13.97 K/UL — HIGH (ref 3.8–10.5)
WBC # FLD AUTO: 13.97 K/UL — HIGH (ref 3.8–10.5)

## 2025-04-28 PROCEDURE — 78802 RP LOCLZJ TUM WHBDY 1 D IMG: CPT | Mod: 26

## 2025-04-28 PROCEDURE — 99232 SBSQ HOSP IP/OBS MODERATE 35: CPT

## 2025-04-28 PROCEDURE — 99233 SBSQ HOSP IP/OBS HIGH 50: CPT

## 2025-04-28 RX ADMIN — CLONAZEPAM 0.5 MILLIGRAM(S): 0.5 TABLET ORAL at 22:06

## 2025-04-28 RX ADMIN — NAFCILLIN 200 GRAM(S): 2 INJECTION, SOLUTION INTRAVENOUS at 16:37

## 2025-04-28 RX ADMIN — NAFCILLIN 200 GRAM(S): 2 INJECTION, SOLUTION INTRAVENOUS at 21:26

## 2025-04-28 RX ADMIN — NAFCILLIN 200 GRAM(S): 2 INJECTION, SOLUTION INTRAVENOUS at 04:28

## 2025-04-28 RX ADMIN — Medication 650 MILLIGRAM(S): at 17:50

## 2025-04-28 RX ADMIN — Medication 650 MILLIGRAM(S): at 18:52

## 2025-04-28 RX ADMIN — APIXABAN 5 MILLIGRAM(S): 2.5 TABLET, FILM COATED ORAL at 05:38

## 2025-04-28 RX ADMIN — Medication 40 MILLIGRAM(S): at 05:38

## 2025-04-28 RX ADMIN — NAFCILLIN 200 GRAM(S): 2 INJECTION, SOLUTION INTRAVENOUS at 08:19

## 2025-04-28 RX ADMIN — NAFCILLIN 200 GRAM(S): 2 INJECTION, SOLUTION INTRAVENOUS at 00:30

## 2025-04-28 RX ADMIN — NAFCILLIN 200 GRAM(S): 2 INJECTION, SOLUTION INTRAVENOUS at 12:24

## 2025-04-28 RX ADMIN — APIXABAN 5 MILLIGRAM(S): 2.5 TABLET, FILM COATED ORAL at 17:50

## 2025-04-28 NOTE — DISCHARGE NOTE PROVIDER - NSDCMRMEDTOKEN_GEN_ALL_CORE_FT
azelastine 137 mcg/inh (0.1%) nasal spray: 1 spray(s) in each nostril once a day as needed for  allergy symptoms  Coenzyme Q10 10 mg oral capsule: 1 tab(s) orally once a day  Crestor 5 mg oral tablet: 1 tab(s) orally every other day  losartan 50 mg oral tablet: 1 tab(s) orally once a day  omega-3 polyunsaturated fatty acids: 1 tab(s) orally once a day  pantoprazole 40 mg oral delayed release tablet: 1 tab(s) orally once a day  Taurine: 1 tab(s) orally once a day   apixaban 5 mg oral tablet: 1 tab(s) orally every 12 hours  azelastine 137 mcg/inh (0.1%) nasal spray: 1 spray(s) in each nostril once a day as needed for  allergy symptoms  Coenzyme Q10 10 mg oral capsule: 1 tab(s) orally once a day  Crestor 5 mg oral tablet: 1 tab(s) orally every other day  losartan 25 mg oral tablet: 1 tab(s) orally once a day  nafcillin: 1,200 milligram(s) intravenous once a day 12g q24hr continuous infusion until 6/8/25  omega-3 polyunsaturated fatty acids: 1 tab(s) orally once a day  pantoprazole 40 mg oral delayed release tablet: 1 tab(s) orally once a day  Taurine: 1 tab(s) orally once a day  Xanax 0.25 mg oral tablet: 1 tab(s) orally 3 times a day as needed for  anxiety

## 2025-04-28 NOTE — PROGRESS NOTE ADULT - PROBLEM SELECTOR PLAN 2
- Pt has HCM, follows w/ Dr. Bib Sapp.   - TTE (3/7/25): EF 65%, LVH, LVOT (gradient 15mmHg at rest, 88mmHg w/ valsalva), ABDULLAHI of AMVL and chordal apparatus, mild LA/RA dilation, trace MR, aortic stenosis (MG 10, PG 20), small pericardial effusion w/o tamponade.  - TTE (4/24/25):  LVEF 60 %. No RWMA. LA severely dilated. Mild- mod MR. Fibrocalcific aortic valve sclerosis w/o stenosis, turbulent flow is seen in the LVOT.  PASP 22 mmHg.  - no BB/CCB given prior intolerance and AV block  - hold mavacamten until at least next outpt visit

## 2025-04-28 NOTE — PROGRESS NOTE ADULT - PROBLEM SELECTOR PLAN 5
RESOLVED   RN informed provider that  told her that pt wanted to "end it all" and wants to "take 4 oxycodone and not wake up"  - Provider went to assess pt  - Pt states he is unhappy in this hospital. States he is unable to walk now and just a few weeks ago he was able to run 8 miles. Additionally states he is unable to do the things he used to do, such as feeding himself and opening a bottle of water. He further states that he feels he should "go to sleep and not wake up" and feels that "everything should end."  - 1:1 was ordered (now off)   - Psych was consulted, pending recs  - Pt's room was changed so he would have a window view  - PT/OT consulted  - Family was updated at bedside and agreed with the plan (1:1, Psych consult, room change, PT/OT).    ##Pt preference to transfer to [facility]  - Pt's family requested transfer to [facility] because they were unhappy with his room   - Per the transfer center, pt would need insurance auth that typically takes 24 hours, pt will have a high co-pay as well as be responsible for the cost of the ambulance   - After further discussion with pt and family, they decided he will stay until Monday for the Gallium scan

## 2025-04-28 NOTE — DISCHARGE NOTE PROVIDER - PROVIDER TOKENS
PROVIDER:[TOKEN:[[id number]],SCHEDULEDAPPT:[05/13/2025],SCHEDULEDAPPTTIME:[12:10 PM],ESTABLISHEDPATIENT:[T]] PROVIDER:[TOKEN:[[id number]],SCHEDULEDAPPT:[05/13/2025],SCHEDULEDAPPTTIME:[12:10 PM],ESTABLISHEDPATIENT:[T]],PROVIDER:[TOKEN:[[id number]],FOLLOWUP:[2 weeks]] PROVIDER:[TOKEN:[[id number]],SCHEDULEDAPPT:[05/13/2025],SCHEDULEDAPPTTIME:[12:10 PM],ESTABLISHEDPATIENT:[T]],PROVIDER:[TOKEN:[[id number]],FOLLOWUP:[2 weeks]],PROVIDER:[TOKEN:[[id number]],SCHEDULEDAPPT:[05/06/2025],SCHEDULEDAPPTTIME:[02:20 PM],ESTABLISHEDPATIENT:[T]]

## 2025-04-28 NOTE — PROGRESS NOTE ADULT - ASSESSMENT
63 y/o active M with h/o HTN, HLD, HOCM with CHB, s/p dual chamber PPM (Biotronik) in 1/2025 at [facility], pAFib noted on device in 3/16/25 (on Eliquis at home), and recent dental work few weeks ago (root canal, crown) p/w fever, fatigue, body aches for 10 days, found to have MSSA bacteremia. DONNY at OSH was negative for vegetation on valve or leads.  Urine culture positive for MSSA (likely 2/2 hematogenous spread) and persistent bacteremia.  s/p Micra placement and extraction of his dual chamber PPM on 4/25/26.  Back on Eliquis yesterday.   - Wound sites are stable. no hematoma.  Pt still has blood culture positive, still getting antx.    - He can have device follow up with Dr. Rivero within 1 month at [facility] office.   - EPS sign off case here.

## 2025-04-28 NOTE — BH CONSULTATION LIAISON PROGRESS NOTE - CURRENT MEDICATION
MEDICATIONS  (STANDING):  apixaban 5 milliGRAM(s) Oral every 12 hours  clonazePAM  Tablet 0.5 milliGRAM(s) Oral at bedtime  nafcillin  IVPB      nafcillin  IVPB 2 Gram(s) IV Intermittent every 4 hours  pantoprazole    Tablet 40 milliGRAM(s) Oral before breakfast  senna 2 Tablet(s) Oral at bedtime  sodium chloride 0.9%. 1000 milliLiter(s) (100 mL/Hr) IV Continuous <Continuous>    MEDICATIONS  (PRN):  acetaminophen     Tablet .. 650 milliGRAM(s) Oral every 6 hours PRN Temp greater or equal to 38C (100.4F), Mild Pain (1 - 3), Moderate Pain (4 - 6)  ALPRAZolam 0.25 milliGRAM(s) Oral every 8 hours PRN anxiety  oxyCODONE    IR 5 milliGRAM(s) Oral every 6 hours PRN Severe Pain (7 - 10)  polyethylene glycol 3350 17 Gram(s) Oral two times a day PRN Constipation  
MEDICATIONS  (STANDING):  apixaban 5 milliGRAM(s) Oral every 12 hours  clonazePAM  Tablet 0.5 milliGRAM(s) Oral at bedtime  nafcillin  IVPB      nafcillin  IVPB 2 Gram(s) IV Intermittent every 4 hours  pantoprazole    Tablet 40 milliGRAM(s) Oral before breakfast  senna 2 Tablet(s) Oral at bedtime  sodium chloride 0.9%. 1000 milliLiter(s) (100 mL/Hr) IV Continuous <Continuous>    MEDICATIONS  (PRN):  acetaminophen     Tablet .. 650 milliGRAM(s) Oral every 6 hours PRN Temp greater or equal to 38C (100.4F), Mild Pain (1 - 3), Moderate Pain (4 - 6)  ALPRAZolam 0.25 milliGRAM(s) Oral every 8 hours PRN anxiety  oxyCODONE    IR 5 milliGRAM(s) Oral every 6 hours PRN Severe Pain (7 - 10)  polyethylene glycol 3350 17 Gram(s) Oral two times a day PRN Constipation

## 2025-04-28 NOTE — PROGRESS NOTE ADULT - NS ATTEND AMEND GEN_ALL_CORE FT
Agree with above. Patient with persistent MSSA bacteremia. NM gallium scan does not suggest source (we examined patient and no evidence of patient over coccyx).  Continue Nafcillin 2 grams IV q4hrs.  Check blood cultures daily for now.

## 2025-04-28 NOTE — PROGRESS NOTE ADULT - SUBJECTIVE AND OBJECTIVE BOX
Interventional Cardiology PA Adult Progress Note    Subjective Assessment:    ROS negative except as noted above.  	  MEDICATIONS:    nafcillin  IVPB      nafcillin  IVPB 2 Gram(s) IV Intermittent every 4 hours      acetaminophen     Tablet .. 650 milliGRAM(s) Oral every 6 hours PRN  ALPRAZolam 0.25 milliGRAM(s) Oral every 8 hours PRN  clonazePAM  Tablet 0.5 milliGRAM(s) Oral at bedtime  oxyCODONE    IR 5 milliGRAM(s) Oral every 6 hours PRN    pantoprazole    Tablet 40 milliGRAM(s) Oral before breakfast  polyethylene glycol 3350 17 Gram(s) Oral two times a day PRN  senna 2 Tablet(s) Oral at bedtime      apixaban 5 milliGRAM(s) Oral every 12 hours  sodium chloride 0.9%. 1000 milliLiter(s) IV Continuous <Continuous>      	    [PHYSICAL EXAM:  TELEMETRY:  T(C): 37.5 (04-28-25 @ 04:31), Max: 37.6 (04-27-25 @ 21:44)  HR: 61 (04-28-25 @ 04:31) (52 - 69)  BP: 155/73 (04-28-25 @ 04:31) (117/70 - 156/74)  RR: 18 (04-28-25 @ 04:31) (18 - 19)  SpO2: 96% (04-28-25 @ 04:31) (94% - 98%)  Wt(kg): --  I&O's Summary    27 Apr 2025 07:01  -  28 Apr 2025 07:00  --------------------------------------------------------  IN: 980 mL / OUT: 1300 mL / NET: -320 mL                                              Appearance: Normal	  HEENT:   Normal oral mucosa, PERRLA, EOMI	  Neck: Supple, + JVD/ - JVD; Carotid Bruit   Cardiovascular: Normal S1 S2, No JVD, No murmurs,   Respiratory: Lungs clear to auscultation/Decreased Breath Sounds/No Rales, Rhonchi, Wheezing	  Gastrointestinal:  Soft, Non-tender, + BS	  Skin: No rashes, No ecchymoses, No cyanosis  Extremities: Normal range of motion, No clubbing, cyanosis or edema  Vascular: Peripheral pulses palpable 2+ bilaterally  Neurologic: Non-focal  Psychiatry: A & O x 3, Mood & affect appropriate      	    ECG:  	  RADIOLOGY:   DIAGNOSTIC TESTING:  [ ] Echocardiogram:   [ ]  Catheterization:  [ ] Stress Test:    [ ] DONNY  OTHER: 	    LABS:	 	  CARDIAC MARKERS:                                  9.2    13.97 )-----------( 521      ( 28 Apr 2025 05:30 )             27.1     04-28    133[L]  |  98  |  33[H]  ----------------------------<  122[H]  4.0   |  26  |  1.87[H]    Ca    7.4[L]      28 Apr 2025 05:30  Phos  3.2     04-28  Mg     2.3     04-28    TPro  5.6[L]  /  Alb  1.9[L]  /  TBili  0.6  /  DBili  x   /  AST  40  /  ALT  28  /  AlkPhos  83  04-28    proBNP:   Lipid Profile:   HgA1c:   TSH:    Cardiology PA Adult Progress Note    Subjective Assessment: patient seen and examined this morning with wife at bedside. Patient in no acute distress with no events overnight. Patient currently denies chest pain, SOB, AGUSTIN, palpitations, dizziness, LOC, N/V/D, fever/chills, diaphoresis, orthopnea/PND, and leg swelling.    ROS negative except as noted above.  	  MEDICATIONS:  nafcillin  IVPB      nafcillin  IVPB 2 Gram(s) IV Intermittent every 4 hours  acetaminophen     Tablet .. 650 milliGRAM(s) Oral every 6 hours PRN  ALPRAZolam 0.25 milliGRAM(s) Oral every 8 hours PRN  clonazePAM  Tablet 0.5 milliGRAM(s) Oral at bedtime  oxyCODONE    IR 5 milliGRAM(s) Oral every 6 hours PRN  pantoprazole    Tablet 40 milliGRAM(s) Oral before breakfast  polyethylene glycol 3350 17 Gram(s) Oral two times a day PRN  senna 2 Tablet(s) Oral at bedtime  apixaban 5 milliGRAM(s) Oral every 12 hours  sodium chloride 0.9%. 1000 milliLiter(s) IV Continuous <Continuous>  	    [PHYSICAL EXAM:  TELEMETRY:  T(C): 37.5 (04-28-25 @ 04:31), Max: 37.6 (04-27-25 @ 21:44)  HR: 61 (04-28-25 @ 04:31) (52 - 69)  BP: 155/73 (04-28-25 @ 04:31) (117/70 - 156/74)  RR: 18 (04-28-25 @ 04:31) (18 - 19)  SpO2: 96% (04-28-25 @ 04:31) (94% - 98%)  Wt(kg): --  I&O's Summary    27 Apr 2025 07:01  -  28 Apr 2025 07:00  --------------------------------------------------------  IN: 980 mL / OUT: 1300 mL / NET: -320 mL                                          Appearance: Normal	  HEENT:   Normal oral mucosa, PERRLA, EOMI	  Neck: Supple,  - JVD; Carotid Bruit   Cardiovascular: Normal S1 S2, No JVD, No murmurs,   Respiratory: Lungs clear to auscultation  Gastrointestinal:  Soft, Non-tender, + BS	  Skin: No rashes, No ecchymoses, No cyanosis  Extremities: Normal range of motion, No clubbing, cyanosis or edema  Vascular: Peripheral pulses palpable 2+ bilaterally  Neurologic: Non-focal  Psychiatry: A & O x 3, Mood & affect appropriate      	    ECG:  	  RADIOLOGY:   DIAGNOSTIC TESTING:  [ ] Echocardiogram:   [ ]  Catheterization:  [ ] Stress Test:    [ ] DONNY  OTHER: 	    LABS:	 	  CARDIAC MARKERS:                            9.2    13.97 )-----------( 521      ( 28 Apr 2025 05:30 )             27.1     04-28    133[L]  |  98  |  33[H]  ----------------------------<  122[H]  4.0   |  26  |  1.87[H]    Ca    7.4[L]      28 Apr 2025 05:30  Phos  3.2     04-28  Mg     2.3     04-28    TPro  5.6[L]  /  Alb  1.9[L]  /  TBili  0.6  /  DBili  x   /  AST  40  /  ALT  28  /  AlkPhos  83  04-28    proBNP:   Lipid Profile:   HgA1c:   TSH:

## 2025-04-28 NOTE — DISCHARGE NOTE PROVIDER - HOSPITAL COURSE
**INCOMPLETE**  63yo M w/ PMHx of HTN, HLD, HCM (follows w/ Dr. Sapp), pAFib (on Eliquis, last dose 4/23 PM), hx of CHB (s/p PPM 1/2025) who initially presented to [facility] with fevers, body aches, fatigue. Pt found to be septic and w/ BCx/UCx (+) for MSSA. Of note, patient had root canal 2wks prior to presentation. Patient was started on Cefazolin. Per verbal report to RN, TTE was concerning for aortic valve vegetation, but subsequent DONNY was negative. At this time concern that infection source is patient's PPM (inserted 1/2025).     Bacteremia  - Presented to OSH 4/17 w/ fever/chills/fatigue, found to be septic w/ BCx/UCx (+) for MSSA, persistnet fevers and unable to clear cultures  - TTE (4/24/25): LVEF 60%, No RWMA, severely dilated LA, mild to mod MR, fibrocalcific aortic valve, turbulent flow in LVOT  - EP and ID following  - S/p PPM explant + Micra placement 4/25/25 for complete heart block  - Treated w/ Nafcillin 2g Q4H  - Gallium scan 4/28, f/u results    Hypertrophic Cardiomyopathy  - TTE (4/24/25): turbulent flow seen in the LVOT. The transaortic gradients are higher than expected for a fibrocalcific aortic valve, could be due to chordal systolic anterior motion of the mitral valve, seen on some images in this study, however, a subaortic membrane cannot be conclusively ruled out.   - Follows w/ Dr. Sapp    Suicidal ideation  - Hospital course c/b passive suicidal ideation w/ plan due to stress of being sick/deconditioned, psych following  - Placed briefly on 1:1 and continued on home Klonopin 0.5mg QHS for anxiety  - Deemed safe for discharge from psychiatric perspective    Patient seen by PT/OT who recommended outpt PT with no OT needs.    Pt seen and examined at bedside this AM without any complaints or events overnight, VSS, labs and telemetry reviewed and pt stable for discharge as discussed with  ___. Pt has received appropriate discharge instructions, including medication regimen, access site management and follow up with  __ in 1-2 weeks.    Discharge medications: ASA 81mg QD, Plavix 75mg QD, ______________.   **INCOMPLETE**  65yo M w/ PMHx of HTN, HLD, HCM (follows w/ Dr. Sapp), pAFib (on Eliquis, last dose 4/23 PM), hx of CHB (s/p PPM 1/2025) who initially presented to [facility] with fevers, body aches, fatigue. Pt found to be septic and w/ BCx/UCx (+) for MSSA. Of note, patient had root canal 2wks prior to presentation. Patient was started on Cefazolin. Per verbal report to RN, TTE was concerning for aortic valve vegetation, but subsequent DONNY was negative. At this time concern that infection source is patient's PPM (inserted 1/2025).     Bacteremia  - Presented to OSH 4/17 w/ fever/chills/fatigue, found to be septic w/ BCx/UCx (+) for MSSA, persistnet fevers and unable to clear cultures  - TTE (4/24/25): LVEF 60%, No RWMA, severely dilated LA, mild to mod MR, fibrocalcific aortic valve, turbulent flow in LVOT  - EP and ID following  - S/p PPM explant + Micra placement 4/25/25 for complete heart block  - S/p Cefazolin, current regimen: Nafcillin 2g Q4H  - Gallium scan 4/28, f/u results    Hypertrophic Cardiomyopathy  - TTE (4/24/25): turbulent flow seen in the LVOT. The transaortic gradients are higher than expected for a fibrocalcific aortic valve, could be due to chordal systolic anterior motion of the mitral valve, seen on some images in this study, however, a subaortic membrane cannot be conclusively ruled out.   - Follows w/ Dr. Sapp    Suicidal ideation  - Hospital course c/b passive suicidal ideation w/ plan due to stress of being sick/deconditioned, psych following  - Placed briefly on 1:1 and continued on home Klonopin 0.5mg QHS for anxiety  - Deemed safe for discharge from psychiatric perspective    Afib  - Rate controlled HR 50-60s  - AC: s/p Heparin gtt, maintained on Eliquis 5mg BID after Micra placement    Patient seen by PT/OT who recommended outpt PT with no OT needs.    Pt stated that he wanted to leave AMA and ________. The risks of leaving AMA were explained to the patient. Risks included, but were not limited to worsening infection, worsening fevers, and severe health complications. All appropriate follow up appointments were made.    Pt seen and examined at bedside this AM without any complaints or events overnight, VSS, labs and telemetry reviewed and pt stable for discharge as discussed with  ___. Pt has received appropriate discharge instructions, including medication regimen, access site management and follow up with  __ in 1-2 weeks.    Discharge medications: Eliquis 5mg BID, **INCOMPLETE**  65yo M w/ PMHx of HTN, HLD, HCM (follows w/ Dr. Sapp), pAFib (on Eliquis, last dose 4/23 PM), hx of CHB (s/p PPM 1/2025) who initially presented to [facility] with fevers, body aches, fatigue. Pt found to be septic and w/ BCx/UCx (+) for MSSA. Of note, patient had root canal 2wks prior to presentation. Patient was started on Cefazolin. Per verbal report to RN, TTE was concerning for aortic valve vegetation, but subsequent DONNY was negative. At this time concern that infection source is patient's PPM (inserted 1/2025).     Bacteremia  - Presented to OSH 4/17 w/ fever/chills/fatigue, found to be septic w/ BCx/UCx (+) for MSSA, persistnet fevers and unable to clear cultures  - TTE (4/24/25): LVEF 60%, No RWMA, severely dilated LA, mild to mod MR, fibrocalcific aortic valve, turbulent flow in LVOT  - EP and ID following  - S/p PPM explant + Micra placement 4/25/25 for complete heart block  - S/p Cefazolin, current regimen: Nafcillin 2g Q4H  - Gallium scan 4/28, f/u results    Hypertrophic Cardiomyopathy  - TTE (4/24/25): turbulent flow seen in the LVOT. The transaortic gradients are higher than expected for a fibrocalcific aortic valve, could be due to chordal systolic anterior motion of the mitral valve, seen on some images in this study, however, a subaortic membrane cannot be conclusively ruled out.   - Follows w/ Dr. Sapp    Suicidal ideation  - Hospital course c/b passive suicidal ideation w/ plan due to stress of being sick/deconditioned, psych following  - Placed briefly on 1:1 and continued on home Klonopin 0.5mg QHS for anxiety  - Deemed safe for discharge from psychiatric perspective    Afib  - Rate controlled HR 50-60s  - AC: s/p Heparin gtt, maintained on Eliquis 5mg BID after Micra placement    Patient seen by PT/OT who recommended outpt PT with no OT needs.    Pt stated that he wanted to leave AMA and ________. The risks of leaving AMA were explained to the patient. Risks included, but were not limited to worsening infection, worsening fevers, and severe health complications. All appropriate follow up appointments were made.    Pt seen and examined at bedside this AM without any complaints or events overnight, VSS, labs and telemetry reviewed and pt stable for discharge as discussed with  ___. Pt has received appropriate discharge instructions, including medication regimen, access site management and follow up with Dr. Rivero 5/13/25 at 12:10PM,  __ in 1-2 weeks.    Discharge medications: Eliquis 5mg BID,      65yo M w/ PMHx of HTN, HLD, HCM (follows w/ Dr. Sapp), pAFib (on Eliquis, last dose 4/23 PM), hx of CHB (s/p PPM 1/2025) who initially presented to [facility] [place] with fevers, body aches, fatigue. Pt found to be septic and w/ BCx/UCx (+) for MSSA. Of note, patient had root canal 2wks prior to presentation. Patient was started on Cefazolin. Per verbal report to RN, TTE was concerning for aortic valve vegetation, but subsequent DONNY was negative. At this time concern that infection source is patient's PPM (inserted 1/2025).     Bacteremia  - Presented to OSH 4/17 w/ fever/chills/fatigue, found to be septic w/ BCx/UCx (+) for MSSA, persistnet fevers and unable to clear cultures  - TTE (4/24/25): LVEF 60%, No RWMA, severely dilated LA, mild to mod MR, fibrocalcific aortic valve, turbulent flow in LVOT  - EP and ID following  - S/p PPM explant + Micra placement 4/25/25 for complete heart block  - S/p Cefazolin, current regimen: Nafcillin 2g Q4H  - Gallium scan 4/28: mildly increased uptake overlying the coccyx, diffuse uptake in the colon probably normal, lower legs and feet are off the field of view  - BCx negative from 4/27; per ID, start nafcillin 2g IV 4hrs for 6 weeks from negative BCx (finished 6/8)    Hypertrophic Cardiomyopathy  - TTE (4/24/25): turbulent flow seen in the LVOT. The transaortic gradients are higher than expected for a fibrocalcific aortic valve, could be due to chordal systolic anterior motion of the mitral valve, seen on some images in this study, however, a subaortic membrane cannot be conclusively ruled out.   - Follows w/ Dr. Sapp    Suicidal ideation  - Hospital course c/b passive suicidal ideation w/ plan due to stress of being sick/deconditioned, psych following  - Placed briefly on 1:1 (then DC'd) and continued on home Klonopin 0.5mg QHS for anxiety  - Deemed safe for discharge from psychiatric perspective    Afib  - Rate controlled HR 50-60s  - AC: s/p Heparin gtt, maintained on Eliquis 5mg BID after Micra placement    Patient seen by PT/OT who recommended outpt PT with no OT needs.      Pt seen and examined at bedside this AM without any complaints or events overnight, VSS, labs and telemetry reviewed and pt stable for discharge as discussed with  ___. Pt has received appropriate discharge instructions, including medication regimen, access site management and follow up with Dr. Rivero 5/13/25 at 12:10PM,  __ in 1-2 weeks.    Discharge medications: Eliquis 5mg BID, nafcillin 2g q4hrs   64 yr old M w/ PMHx of HTN, HLD, HCM (follows w/ Dr. Sapp), pAFib (on Eliquis, last dose 4/23 PM), hx of CHB (s/p PPM 1/2025) who initially presented to [facility] with fevers, body aches, fatigue. Pt found to be septic and w/ BCx/UCx (+) for MSSA. Of note, patient had root canal 2wks prior to presentation. Patient was started on Cefazolin. Per verbal report to RN, TTE was concerning for aortic valve vegetation, but subsequent DONNY was negative. Concern that infection source is patient's PPM (inserted 1/2025).     Bacteremia  - Presented to OSH 4/17 w/ fever/chills/fatigue, found to be septic w/ BCx/UCx (+) for MSSA, persistnet fevers and unable to clear cultures  - TTE (4/24/25): LVEF 60%, No RWMA, severely dilated LA, mild to mod MR, fibrocalcific aortic valve, turbulent flow in LVOT  - EP and ID following  - S/p PPM explant + Micra placement 4/25/25 for complete heart block  - S/p Cefazolin, current regimen: Nafcillin 2g Q4H  - Gallium scan 4/28: mildly increased uptake overlying the coccyx, diffuse uptake in the colon probably normal, lower legs and feet are off the field of view  - BCx negative from 4/27; per ID, start nafcillin 2g IV 4hrs for 6 weeks from negative BCx (finished 6/8)    Hypertrophic Cardiomyopathy  - TTE (4/24/25): turbulent flow seen in the LVOT. The transaortic gradients are higher than expected for a fibrocalcific aortic valve, could be due to chordal systolic anterior motion of the mitral valve, seen on some images in this study, however, a subaortic membrane cannot be conclusively ruled out.   - Follows w/ Dr. Sapp    Suicidal ideation  - Hospital course c/b passive suicidal ideation w/ plan due to stress of being sick/deconditioned, psych following  - Placed briefly on 1:1 (then DC'd) and continued on home Klonopin 0.5mg QHS for anxiety  - Deemed safe for discharge from psychiatric perspective    Afib  - Rate controlled HR 50-60s  - AC: s/p Heparin gtt, maintained on Eliquis 5mg BID after Micra placement    Patient seen by PT/OT who recommended outpt PT with no OT needs.      Pt seen and examined at bedside this AM without any complaints or events overnight, VSS, labs and telemetry reviewed and pt stable for discharge as discussed with Dr. Del Toro. Pt has received appropriate discharge instructions, including medication regimen, access site management and follow up with Dr. Rivero 5/13/25 at 12:10PM    Discharge medications: Eliquis 5mg BID, nafcillin 2g IV q4hrs, Pantoprazole 40mg PO daily             64 yr old M w/ PMHx of HTN, HLD, HCM (follows w/ Dr. Sapp), pAFib (on Eliquis, last dose 4/23 PM), hx of CHB (s/p PPM 1/2025) who initially presented to [facility] with fevers, body aches, fatigue. Pt found to be septic and w/ BCx/UCx (+) for MSSA. Of note, patient had root canal 2wks prior to presentation. Patient was started on Cefazolin. Per verbal report to RN, TTE was concerning for aortic valve vegetation, but subsequent DONNY was negative. Concern that infection source is patient's PPM (inserted 1/2025).     Bacteremia  - Presented to OSH 4/17 w/ fever/chills/fatigue, found to be septic w/ BCx/UCx (+) for MSSA, persistnet fevers and unable to clear cultures  - TTE (4/24/25): LVEF 60%, No RWMA, severely dilated LA, mild to mod MR, fibrocalcific aortic valve, turbulent flow in LVOT  - EP and ID following  - S/p PPM explant + Micra placement 4/25/25 for complete heart block  - S/p Cefazolin, current regimen: Nafcillin 2g IV Q4H  - Gallium scan 4/28: mildly increased uptake overlying the coccyx, diffuse uptake in the colon probably normal, lower legs and feet are off the field of view  - BCx negative from 4/27; per ID, continue Nafcillin 2g IV 4hrs for 6 weeks from negative BCx (last dose 6/8/25)    Hypertrophic Cardiomyopathy  - TTE (4/24/25): turbulent flow seen in the LVOT. The transaortic gradients are higher than expected for a fibrocalcific aortic valve, could be due to chordal systolic anterior motion of the mitral valve, seen on some images in this study, however, a subaortic membrane cannot be conclusively ruled out.   - Follows w/ Dr. Sapp    Suicidal ideation  - Hospital course c/b passive suicidal ideation w/ plan due to stress of being sick/deconditioned, psych following  - Placed briefly on 1:1 (then DC'd) and continued on home Klonopin 0.5mg QHS for anxiety  - Deemed safe for discharge from psychiatric perspective    Afib  - Rate controlled HR 50-60s  - AC: s/p Heparin gtt, maintained on Eliquis 5mg BID after Micra placement    Patient seen by PT/OT who recommended outpt PT with no OT needs.      Pt seen and examined at bedside this AM without any complaints or events overnight, VSS, labs and telemetry reviewed and pt stable for discharge as discussed with Dr. Del Toro. Pt has received appropriate discharge instructions, including medication regimen, access site management and follow up with Dr. Rivero 5/13/25 at 12:10PM    Discharge medications: Eliquis 5mg BID, nafcillin 2g IV q4hrs, Pantoprazole 40mg PO daily             64 yr old M w/ PMHx of HTN, HLD, HCM (follows w/ Dr. Sapp), pAFib (on Eliquis, last dose 4/23 PM), hx of CHB (s/p PPM 1/2025) who initially presented to [facility] with fevers, body aches, fatigue. Pt found to be septic and w/ BCx/UCx (+) for MSSA. Of note, patient had root canal 2wks prior to presentation. Patient was started on Cefazolin. Per verbal report to RN, TTE was concerning for aortic valve vegetation, but subsequent DONNY was negative. Concern that infection source is patient's PPM (inserted 1/2025).     Bacteremia  - Presented to OSH 4/17 w/ fever/chills/fatigue, found to be septic w/ BCx/UCx (+) for MSSA, persistent fevers and unable to clear cultures  - TTE (4/24/25): LVEF 60%, No RWMA, severely dilated LA, mild to mod MR, fibrocalcific aortic valve, turbulent flow in LVOT  - EP and ID following  - S/p PPM explant + Micra placement 4/25/25 for complete heart block  - S/p Cefazolin, current regimen: Nafcillin 2g IV Q4H  - Gallium scan 4/28: mildly increased uptake overlying the coccyx, diffuse uptake in the colon probably normal, lower legs and feet are off the field of view  - BCx negative from 4/27; per ID, continue Nafcillin 2g IV 4hrs for 6 weeks from negative BCx (last dose 6/8/25)    Hypertrophic Cardiomyopathy  - TTE (4/24/25): turbulent flow seen in the LVOT. The transaortic gradients are higher than expected for a fibrocalcific aortic valve, could be due to chordal systolic anterior motion of the mitral valve, seen on some images in this study, however, a subaortic membrane cannot be conclusively ruled out.   - Follows w/ Dr. Sapp    Suicidal ideation  - Hospital course c/b passive suicidal ideation w/ plan due to stress of being sick/deconditioned, psych following  - Placed briefly on 1:1 (then DC'd) and continued on home Klonopin 0.5mg QHS for anxiety  - Deemed safe for discharge from psychiatric perspective    Afib  - Rate controlled HR 50-60s  - AC: s/p Heparin gtt, maintained on Eliquis 5mg BID after Micra placement    Patient seen by PT/OT who recommended outpt PT with no OT needs.      Pt seen and examined at bedside this AM without any complaints or events overnight, VSS, labs and telemetry reviewed and pt stable for discharge as discussed with Dr. Del Toro. Pt has received appropriate discharge instructions, including medication regimen, access site management and follow up with Dr. Rivero 5/13/25 at 12:10PM    Discharge medications: Eliquis 5mg BID, nafcillin 2g IV q4hrs, Pantoprazole 40mg PO daily             64 yr old M w/ PMHx of HTN, HLD, HCM (follows w/ Dr. Sapp), pAFib (on Eliquis, last dose 4/23 PM), hx of CHB (s/p PPM 1/2025) who initially presented to [facility] with fevers, body aches, fatigue. Pt found to be septic and w/ BCx/UCx (+) for MSSA. Of note, patient had root canal 2wks prior to presentation. Patient was started on Cefazolin. Per verbal report to RN, TTE was concerning for aortic valve vegetation, but subsequent DONNY was negative. Concern that infection source is patient's PPM (inserted 1/2025).     Bacteremia  - Presented to OSH 4/17 w/ fever/chills/fatigue, found to be septic w/ BCx/UCx (+) for MSSA, persistent fevers and unable to clear cultures  - TTE (4/24/25): LVEF 60%, No RWMA, severely dilated LA, mild to mod MR, fibrocalcific aortic valve, turbulent flow in LVOT  - EP and ID following  - S/p PPM explant + Micra placement 4/25/25 for complete heart block  - S/p Cefazolin, current regimen: Nafcillin 2g IV Q4H  - Gallium scan 4/28: mildly increased uptake overlying the coccyx, diffuse uptake in the colon probably normal, lower legs and feet are off the field of view  - BCx negative from 4/27; patient now s/p Picc line placement on 5/1/25. Per ID, continue Nafcillin 2g IV 4hrs for 6 weeks from negative BCx (last dose 6/8/25)    Hypertrophic Cardiomyopathy  - TTE (4/24/25): turbulent flow seen in the LVOT. The transaortic gradients are higher than expected for a fibrocalcific aortic valve, could be due to chordal systolic anterior motion of the mitral valve, seen on some images in this study, however, a subaortic membrane cannot be conclusively ruled out.   - Follows w/ Dr. Sapp    Suicidal ideation  - Hospital course c/b passive suicidal ideation w/ plan due to stress of being sick/deconditioned, psych following  - Placed briefly on 1:1 (then DC'd) and continued on home Klonopin 0.5mg QHS for anxiety  - Deemed safe for discharge from psychiatric perspective    Afib  - Rate controlled HR 50-60s  - AC: s/p Heparin gtt, maintained on Eliquis 5mg BID after Micra placement    Patient seen by PT/OT who recommended outpt PT with no OT needs.      Pt seen and examined at bedside this AM without any complaints or events overnight, VSS, labs and telemetry reviewed and pt stable for discharge as discussed with Dr. Del Toro. Pt has received appropriate discharge instructions, including medication regimen, access site management and follow up with Dr. Rivero 5/13/25 at 12:10PM    Discharge medications: Eliquis 5mg BID, nafcillin 2g IV q4hrs, Pantoprazole 40mg PO daily             64 yr old M w/ PMHx of HTN, HLD, HCM (follows w/ Dr. Sapp), pAFib (on Eliquis, last dose 4/23 PM), hx of CHB (s/p PPM 1/2025) who initially presented to [facility] with fevers, body aches, fatigue. Pt found to be septic and w/ BCx/UCx (+) for MSSA. Of note, patient had root canal 2wks prior to presentation. Patient was started on Cefazolin. Per verbal report to RN, TTE was concerning for aortic valve vegetation, but subsequent DONNY was negative. Concern that infection source is patient's PPM (inserted 1/2025).     Bacteremia  - Presented to OSH 4/17 w/ fever/chills/fatigue, found to be septic w/ BCx/UCx (+) for MSSA, persistent fevers and unable to clear cultures  - TTE (4/24/25): LVEF 60%, No RWMA, severely dilated LA, mild to mod MR, fibrocalcific aortic valve, turbulent flow in LVOT  - EP and ID following  - S/p PPM explant + Micra placement 4/25/25 for complete heart block  - S/p Cefazolin, current regimen: Nafcillin 2g IV Q4H  - Gallium scan 4/28: mildly increased uptake overlying the coccyx, diffuse uptake in the colon probably normal, lower legs and feet are off the field of view  - BCx negative from 4/27; patient now s/p Picc line placement on 5/1/25. Per ID, continue Nafcillin 2g IV 4hrs for 6 weeks from negative BCx (last dose 6/8/25)    Hypertrophic Cardiomyopathy  - TTE (4/24/25): turbulent flow seen in the LVOT. The transaortic gradients are higher than expected for a fibrocalcific aortic valve, could be due to chordal systolic anterior motion of the mitral valve, seen on some images in this study, however, a subaortic membrane cannot be conclusively ruled out.   - Follows w/ Dr. Sapp    Suicidal ideation  - Hospital course c/b passive suicidal ideation w/ plan due to stress of being sick/deconditioned, psych following  - Placed briefly on 1:1 (then DC'd) and continued on home Klonopin 0.5mg QHS for anxiety  - Deemed safe for discharge from psychiatric perspective    Afib  - Rate controlled HR 50-60s  - AC: s/p Heparin gtt, maintained on Eliquis 5mg BID after Micra placement    Patient seen by PT/OT who recommended outpt PT with no OT needs.      Pt seen and examined at bedside this AM without any complaints or events overnight, VSS, labs and telemetry reviewed and pt stable for discharge as discussed with Dr. Del Toro. Pt has received appropriate discharge instructions, including medication regimen, access site management and follow up with Dr. Rivero 5/13/25 at 12:10PM. Pt also to f/u with infectious disease Dr. Mustafa in 2 weeks ([address], [address], [phone]), the office will reach out to patient to schedule an appointment.    Discharge medications: Eliquis 5mg BID, nafcillin 2g IV q4hrs, Pantoprazole 40mg PO daily             64 yr old M w/ PMHx of HTN, HLD, HCM (follows w/ Dr. Sapp), pAFib (on Eliquis, last dose 4/23 PM), hx of CHB (s/p PPM 1/2025) who initially presented to [facility] with fevers, body aches, fatigue. Pt found to be septic and w/ BCx/UCx (+) for MSSA. Of note, patient had root canal 2wks prior to presentation. Patient was started on Cefazolin. Per verbal report to RN, TTE was concerning for aortic valve vegetation, but subsequent DONNY was negative. Concern that infection source is patient's PPM (inserted 1/2025).     Bacteremia  - Presented to OSH 4/17 w/ fever/chills/fatigue, found to be septic w/ BCx/UCx (+) for MSSA, persistent fevers and unable to clear cultures  - TTE (4/24/25): LVEF 60%, No RWMA, severely dilated LA, mild to mod MR, fibrocalcific aortic valve, turbulent flow in LVOT  - EP and ID following  - S/p PPM explant + Micra placement 4/25/25 for complete heart block  - S/p Cefazolin, current regimen: Nafcillin 2g IV Q4H  - Gallium scan 4/28: mildly increased uptake overlying the coccyx, diffuse uptake in the colon probably normal, lower legs and feet are off the field of view  - BCx negative from 4/27; patient now s/p Picc line placement on 5/1/25. Per ID, continue Nafcillin 12g IV q24 hrs continuous infusion for 6 weeks from negative BCx (last dose 6/8/25)    Hypertrophic Cardiomyopathy  - TTE (4/24/25): turbulent flow seen in the LVOT. The transaortic gradients are higher than expected for a fibrocalcific aortic valve, could be due to chordal systolic anterior motion of the mitral valve, seen on some images in this study, however, a subaortic membrane cannot be conclusively ruled out.   - Follows w/ Dr. Sapp    Suicidal ideation  - Hospital course c/b passive suicidal ideation w/ plan due to stress of being sick/deconditioned, psych following  - Placed briefly on 1:1 (then DC'd) and continued on home Klonopin 0.5mg QHS for anxiety  - Deemed safe for discharge from psychiatric perspective    Afib  - Rate controlled HR 50-60s  - AC: s/p Heparin gtt, maintained on Eliquis 5mg BID after Micra placement    Patient seen by PT/OT who recommended outpt PT with no OT needs.      Pt seen and examined at bedside this AM without any complaints or events overnight, VSS, labs and telemetry reviewed and pt stable for discharge as discussed with Dr. Del Toro. Pt has received appropriate discharge instructions, including medication regimen, access site management and follow up with Dr. Rivero 5/13/25 at 12:10PM and Dr Lopez 5/6/25 at 2:20pm. Pt also to f/u with infectious disease Dr. Mustafa in 2 weeks ([address], [phone]), the office will reach out to patient to schedule an appointment.    Discharge medications: Eliquis 5mg BID, nafcillin 12g IV q24hrs continuous, Pantoprazole 40mg PO daily, losartan 25mg qd

## 2025-04-28 NOTE — DISCHARGE NOTE PROVIDER - CARE PROVIDER_API CALL
Giovani Rivero  Cardiac Electrophysiology  [address] [place] [address]  Phone: [phone]  Fax: [phone]  Established Patient  Scheduled Appointment: 05/13/2025 12:10 PM   Giovani Rivero  Cardiac Electrophysiology  [address] [place] [address]  Phone: [phone]  Fax: [phone]  Established Patient  Scheduled Appointment: 05/13/2025 12:10 PM    Frank Mustafa  Infectious Disease  [address] [place] [address]  Phone: [phone]  Fax: [phone]  Follow Up Time: 2 weeks   Giovani Rivero  Cardiac Electrophysiology  [address] [place] [address]  Phone: [phone]  Fax: [phone]  Established Patient  Scheduled Appointment: 05/13/2025 12:10 PM    Frank Mustafa  Infectious Disease  [address] [address]  Phone: [phone]  Fax: [phone]  Follow Up Time: 2 weeks    Bobby Lopez  Cardiovascular Disease  [address] [address]  Phone: [phone]  Fax: [phone]  Established Patient  Scheduled Appointment: 05/06/2025 02:20 PM

## 2025-04-28 NOTE — PROVIDER CONTACT NOTE (CRITICAL VALUE NOTIFICATION) - SITUATION
Pt has growth in aerobic bottle of gram positive cocci in clusters
admitted for infected PPM s/p extraction and micra PPM placement

## 2025-04-28 NOTE — DISCHARGE NOTE PROVIDER - CARE PROVIDERS DIRECT ADDRESSES
,[email or web address]

## 2025-04-28 NOTE — BH CONSULTATION LIAISON PROGRESS NOTE - NSBHFUPINTERVALHXFT_PSY_A_CORE
Pt assessed at bedside this afternoon.  Pt reported improvement in mood and reported hope associated with planned discharge and transfer of care to facility close to home (pt stated he & wife live appx 75 miles away). Pt described recent occurrence of passive SI as feeling "if something happens to me, so be it," which he associated with feeling "left alone" and "afraid" during the early phase of his medical tx. Pt denied current SI. Pt stated his wife has been an important source of support and has contributed to his improvement in mood and sense of hope, as has learning about his disposition plans. Pt expressed openness to continued contact with psychiatry.
Chart reviewed. No acute behavioral events overnight.     On approach, pt appears euthymic. Pt reports he is feeling much better today, both physically and psychologically. Reports he felt like dying yesterday because of stress of being sick and especially being in a hospital far from his wife. Pt reports now that his wife is here and he's exploring transfer to a facility closer to home, he does not feel depressed or suicidal. Pt denies any thoughts to harm self. Pt's wife she has no concerns about pt's mental health or safety.

## 2025-04-28 NOTE — BH CONSULTATION LIAISON PROGRESS NOTE - NSBHCHARTREVIEWVS_PSY_A_CORE FT
Vital Signs Last 24 Hrs  T(C): 37.6 (28 Apr 2025 16:46), Max: 37.6 (27 Apr 2025 21:44)  T(F): 99.7 (28 Apr 2025 16:46), Max: 99.7 (28 Apr 2025 16:46)  HR: 51 (28 Apr 2025 16:46) (51 - 76)  BP: 153/71 (28 Apr 2025 16:46) (137/65 - 156/71)  BP(mean): 102 (28 Apr 2025 16:46) (69 - 105)  RR: 18 (28 Apr 2025 16:46) (18 - 19)  SpO2: 96% (28 Apr 2025 16:46) (95% - 98%)    Parameters below as of 28 Apr 2025 16:46  Patient On (Oxygen Delivery Method): room air    
Vital Signs Last 24 Hrs  T(C): 37.3 (27 Apr 2025 08:45), Max: 38.2 (27 Apr 2025 00:33)  T(F): 99.1 (27 Apr 2025 08:45), Max: 100.7 (27 Apr 2025 00:33)  HR: 69 (27 Apr 2025 08:45) (52 - 69)  BP: 117/70 (27 Apr 2025 08:45) (117/70 - 165/72)  BP(mean): 85 (27 Apr 2025 08:45) (85 - 103)  RR: 19 (27 Apr 2025 08:45) (18 - 20)  SpO2: 96% (27 Apr 2025 08:45) (92% - 96%)    Parameters below as of 27 Apr 2025 08:45  Patient On (Oxygen Delivery Method): room air

## 2025-04-28 NOTE — PROGRESS NOTE ADULT - PROBLEM SELECTOR PLAN 1
Presented to [facility] 4/17 w/ fever/chills/fatigue, found to be septic w/ BCx/UCx (+) for MSSA  - Per verbal report (documentation not transferred with patient), TTE was concerning for aortic valve vegetation, but DONNY was negative for vegetation   - 2wks prior to presentation pt had root canal  - Concern that PPM could be source of infection s/p PPM extraction and Micra placement   - gallium scan 4/28: mildly increased uptake overlying the coccyx, diffue uptake in the colon probably normal, lower legs and feet are off the field of view.              o Upon exam of coccyx, no indications of active infection   - c/w Nafcillin 2g Q4H and daily blood culture           o  Blood cultures + from 4/25, 4/26  for gram + cocci   - EP following (known to Dr. Rivero) appreciate recs   - ID following, appreciate recs

## 2025-04-28 NOTE — DISCHARGE NOTE PROVIDER - NSDCFUSCHEDAPPT_GEN_ALL_CORE_FT
[facility] Physician [facility]  HEARTVASC [address]  Scheduled Appointment: 05/27/2025     Giovani Rivero  [facility]  HEARTVASC [address]  Scheduled Appointment: 05/13/2025    [facility]  HEARTVASC [address]  Scheduled Appointment: 05/27/2025     Bobby Lopez  [facility] Physician [facility]  HEARTVASC [address]  Scheduled Appointment: 05/06/2025    Giovani Rivero  [facility]  HEARTVASC [address] [facility] [address]  Scheduled Appointment: 05/13/2025    [facility]  HEARTVASC [address]  Scheduled Appointment: 05/27/2025     Bobby Lopez  [facility] Physician [facility]  HEARTVASC [address]  Scheduled Appointment: 05/06/2025    Giovani Rivero  [facility]  HEARTVASC [address] [facility] [address]  Scheduled Appointment: 05/13/2025    Bib Sapp  [facility]  HEARTFAIL [address]  Scheduled Appointment: 05/20/2025    [facility]  HEARTVASC [address]  Scheduled Appointment: 05/27/2025

## 2025-04-28 NOTE — PROGRESS NOTE ADULT - ASSESSMENT
63yo M w/  HTN, HLD, HCM (follows w/ Dr. Sapp), pAFib (on Eliquis, last dose 4/23 , cervical neck surgery (?fusion) 30 years ago, hx of CHB (s/p PPM 1/2025) who initially presented to [facility] with fevers, body aches, fatigue found to have MSSA BSI and suspected PPM infection transferred to [facility] for further management and removal of PPM. Patient febrile on arrival with leukocytosis 23. S/p PPM extraction 4/25, now has leadless Micra device. Gallium scan showed uptake at coccyx - clinically there is no evidence on infection. Gallium also showed activity in bowel -- patient has been constipated. Per primary team, patient pending transfer to [facility] to be closer to home.       Recommendations:  - F/u BCx from 4/24 -- NGTD   - F/u BCx from 4/25-- MSSA 1/4 bottles   - F/u BCx from 4/26-- MSSA 2/4 bottles   - f/u Bcx 4/27 in lab  - F/u OR PPM cultures --ngtd   - Obtain daily blood cultures for now   - Continue nafcillin 2g IV Q4h    Team 2 will follow you.    Case d/w primary team.  Final recommendation pending attending note.    Yue Noe, Infectious Diseases PA  Please reach out for any questions 9 am-5pm.   For evenings and weekends, please call the ID physician on call.

## 2025-04-28 NOTE — BH CONSULTATION LIAISON PROGRESS NOTE - NSBHCONSULTFOLLOWAFTERCARE_PSY_A_CORE FT
follow up with PCP
follow up with PCP as needed  please consult psychiatry if any further issues arise

## 2025-04-28 NOTE — PROGRESS NOTE ADULT - SUBJECTIVE AND OBJECTIVE BOX
INFECTIOUS DISEASES CONSULT FOLLOW-UP NOTE    INTERVAL HPI/OVERNIGHT EVENTS:    Patient seen and examined at bedside. DEANNE. Afebrile. Endorses fatigue. Otherwise no complaints. Went for gallium scan this morning.        ROS:   Constitutional, eyes, ENT, cardiovascular, respiratory, gastrointestinal, genitourinary, integumentary, neurological, psychiatric and heme/lymph are otherwise negative other than noted above       ANTIBIOTICS/RELEVANT:    MEDICATIONS  (STANDING):  apixaban 5 milliGRAM(s) Oral every 12 hours  clonazePAM  Tablet 0.5 milliGRAM(s) Oral at bedtime  nafcillin  IVPB      nafcillin  IVPB 2 Gram(s) IV Intermittent every 4 hours  pantoprazole    Tablet 40 milliGRAM(s) Oral before breakfast  senna 2 Tablet(s) Oral at bedtime  sodium chloride 0.9%. 1000 milliLiter(s) (100 mL/Hr) IV Continuous <Continuous>    MEDICATIONS  (PRN):  acetaminophen     Tablet .. 650 milliGRAM(s) Oral every 6 hours PRN Temp greater or equal to 38C (100.4F), Mild Pain (1 - 3), Moderate Pain (4 - 6)  ALPRAZolam 0.25 milliGRAM(s) Oral every 8 hours PRN anxiety  oxyCODONE    IR 5 milliGRAM(s) Oral every 6 hours PRN Severe Pain (7 - 10)  polyethylene glycol 3350 17 Gram(s) Oral two times a day PRN Constipation        Vital Signs Last 24 Hrs  T(C): 37.5 (28 Apr 2025 08:45), Max: 37.6 (27 Apr 2025 21:44)  T(F): 99.5 (28 Apr 2025 08:45), Max: 99.6 (27 Apr 2025 21:44)  HR: 66 (28 Apr 2025 08:45) (52 - 66)  BP: 137/74 (28 Apr 2025 08:45) (137/65 - 156/71)  BP(mean): 69 (28 Apr 2025 08:45) (69 - 105)  RR: 18 (28 Apr 2025 08:45) (18 - 19)  SpO2: 97% (28 Apr 2025 08:45) (95% - 97%)    Parameters below as of 28 Apr 2025 08:45  Patient On (Oxygen Delivery Method): room air        04-27-25 @ 07:01 - 04-28-25 @ 07:00  --------------------------------------------------------  IN: 980 mL / OUT: 1300 mL / NET: -320 mL    04-28-25 @ 07:01  -  04-28-25 @ 14:02  --------------------------------------------------------  IN: 180 mL / OUT: 0 mL / NET: 180 mL      PHYSICAL EXAM:  Constitutional: alert, NAD  Eyes: the sclera and conjunctiva were normal.   ENT: the ears and nose were normal in appearance.   Neck: the appearance of the neck was normal and the neck was supple.   chest L upper chest with bandage   Pulmonary: no respiratory distress and symmetric chest rise   Abdomen: soft, non-tender  Coccyx without evidence of SSTI, no tenderness to palpation        LABS:                        9.2    13.97 )-----------( 521      ( 28 Apr 2025 05:30 )             27.1     04-28    133[L]  |  98  |  33[H]  ----------------------------<  122[H]  4.0   |  26  |  1.87[H]    Ca    7.4[L]      28 Apr 2025 05:30  Phos  3.2     04-28  Mg     2.3     04-28    TPro  5.6[L]  /  Alb  1.9[L]  /  TBili  0.6  /  DBili  x   /  AST  40  /  ALT  28  /  AlkPhos  83  04-28      Urinalysis Basic - ( 28 Apr 2025 05:30 )    Color: x / Appearance: x / SG: x / pH: x  Gluc: 122 mg/dL / Ketone: x  / Bili: x / Urobili: x   Blood: x / Protein: x / Nitrite: x   Leuk Esterase: x / RBC: x / WBC x   Sq Epi: x / Non Sq Epi: x / Bacteria: x        MICROBIOLOGY:    Culture - Blood (collected 04-26-25 @ 18:03)  Source: Blood Blood-Peripheral  Gram Stain (04-28-25 @ 07:12):    Growth in aerobic bottle: Gram Positive Cocci in Clusters  Preliminary Report (04-28-25 @ 07:12):    Growth in aerobic bottle: Gram Positive Cocci in Clusters    Culture - Blood (collected 04-26-25 @ 18:03)  Source: Blood Blood-Peripheral  Gram Stain (04-28-25 @ 10:13):    Growth in anaerobic bottle: Gram Positive Cocci in Clusters  Preliminary Report (04-28-25 @ 10:13):    Growth in anaerobic bottle: Gram Positive Cocci in Clusters    Culture - Blood (collected 04-25-25 @ 20:36)  Source: Blood Blood-Peripheral  Preliminary Report (04-28-25 @ 01:01):    No growth at 48 Hours    Culture - Blood (collected 04-25-25 @ 20:36)  Source: Blood Blood-Peripheral  Gram Stain (04-27-25 @ 06:10):    Growth in anaerobic bottle: Gram Positive Cocci in Clusters  Preliminary Report (04-27-25 @ 22:49):    Growth in anaerobic bottle: Staphylococcus aureus    Direct identification is available within approximately 3-5    hours either by Blood Panel Multiplexed PCR or Direct    MALDI-TOF. Details: [email or web address]  Organism: Blood Culture PCR (04-27-25 @ 07:09)  Organism: Blood Culture PCR (04-27-25 @ 07:09)      Method Type: PCR      -  Methicillin SENSITIVE Staphylococcus aureus (MSSA): Detec Any isolate of Staphylococcus aureus from a blood culture is NOT considered a contaminant.    Culture - Wound Aerobic/Anaerobic (collected 04-25-25 @ 16:56)  Source: Surgical Swab Surgical Swab  Preliminary Report (04-27-25 @ 07:22):    No growth to date    Culture - Wound Aerobic/Anaerobic (collected 04-25-25 @ 16:56)  Source: Pacemaker Pacemaker  Preliminary Report (04-27-25 @ 13:27):    No growth to date.    Urinalysis with Rflx Culture (collected 04-24-25 @ 03:07)    Culture - Urine (collected 04-24-25 @ 03:07)  Source: Clean Catch None  Final Report (04-25-25 @ 16:22):    No growth    Culture - Blood (collected 04-24-25 @ 03:04)  Source: Blood Blood  Preliminary Report (04-28-25 @ 12:01):    No growth at 4 days        RADIOLOGY & ADDITIONAL STUDIES:  Reviewed

## 2025-04-28 NOTE — PROGRESS NOTE ADULT - NS ATTEND AMEND GEN_ALL_CORE FT
Mr. Kennedy is a 64M with history of HCM, HTN, paroxysmal atrial fibrillation (Eliquis, last dose 4/23), recent CHB with PPM dual chamber in Jan 2025 presented initially to [facility] with fevers, body aches and admitted for sepsis with MSSA bacteremia. Sent to [facility] for device removal in setting of persistent fevers/ positive blood cultures.     Exam:   NAD  JVP normal  Lungs CTA  2/6 systolic murmur  WWP, no edema      MSSA bacteremia- ID consulted,  Nafcillin. Device removed, gallium scan today. Still hasn't cleared cultures, repeat. f/u ID recs.   PPM- Device removed. Micra for time being  HCM- Follows with HF Sapp, no reported gradients. Avoid hypovolemia.    Dispo- wants to return to [facility] for social support. Will work with primary cardiologists who transferred patient but ongoing issue of blood cultures

## 2025-04-28 NOTE — DISCHARGE NOTE PROVIDER - NSDCCPCAREPLAN_GEN_ALL_CORE_FT
PRINCIPAL DISCHARGE DIAGNOSIS  Diagnosis: Bacteremia  Assessment and Plan of Treatment: You presented to the hospital with fevers, chills and fatigue. You were found to have bacteria in your blood stream (Methicillin susceptible staphylococcus aureus). Your pacemaker was removed as a possible source of infection and you were treated with an antibiotic called Nafcillin. You had a gallium scan on 4/28 to further assess the source of infection. The results are still pending.   Please follow up with _____ to ensure your infection has resolved. Please follow up with electrophysiology, Dr. Rivero _____.  Please go to the hospital if your fevers, chills, and fatigue return.     PRINCIPAL DISCHARGE DIAGNOSIS  Diagnosis: Bacteremia  Assessment and Plan of Treatment: You presented to the hospital with fevers, chills and fatigue. You were found to have bacteria in your blood stream (Methicillin susceptible staphylococcus aureus). Your pacemaker was removed as a possible source of infection and you were treated with an antibiotic called Nafcillin. You had a gallium scan on 4/28 to further assess the source of infection. The results are still pending.   Please follow up with _____ to ensure your infection has resolved. Please follow up with electrophysiology, Dr. Rivero _____.  Please return to the hospital if you start to develop fevers, chills, fatigue, nausea, vomiting.      SECONDARY DISCHARGE DIAGNOSES  Diagnosis: Afib  Assessment and Plan of Treatment: You have an abnormal heart rhythm (arrhythmia) called atrial fibrillation. With this condition, the hearts 2 upper chambers (the atria) quiver rather than squeeze the blood out in a normal pattern. This leads to an irregular and sometimes rapid heartbeat. Atrial fibrillation is serious condition as it affects the heart’s ability to fill with blood as it should and blood clots may form, which increases the risk for stroke.  -Please CONTINUE  ELIQUIS 5MG TWICE A DAY to prevent a stroke.  -Please CONTINUE _____ TWICE A DAY to keep your heart rate regular  -Please CONTINUE _____ DAILY to keep your heart in normal rhythm  -Please follow up with Dr. Rivero _____.     PRINCIPAL DISCHARGE DIAGNOSIS  Diagnosis: Bacteremia  Assessment and Plan of Treatment: You presented to the hospital with fevers, chills and fatigue. You were found to have bacteria in your blood stream (Methicillin susceptible staphylococcus aureus). Your pacemaker was removed as a possible source of infection and you were treated with an antibiotic called Nafcillin. You had a gallium scan on 4/28 to further assess the source of infection. The results are still pending.   Please follow up with _____ to ensure your infection has resolved. Please follow up with electrophysiology, Dr. Rivero 5/13/25 at 12:10PM.  Please return to the hospital if you start to develop fevers, chills, fatigue, nausea, vomiting.      SECONDARY DISCHARGE DIAGNOSES  Diagnosis: Afib  Assessment and Plan of Treatment: You have an abnormal heart rhythm (arrhythmia) called atrial fibrillation. With this condition, the hearts 2 upper chambers (the atria) quiver rather than squeeze the blood out in a normal pattern. This leads to an irregular and sometimes rapid heartbeat. Atrial fibrillation is serious condition as it affects the heart’s ability to fill with blood as it should and blood clots may form, which increases the risk for stroke.  -Please CONTINUE  ELIQUIS 5MG TWICE A DAY to prevent a stroke.  -Please CONTINUE _____ TWICE A DAY to keep your heart rate regular  -Please CONTINUE _____ DAILY to keep your heart in normal rhythm  -Please follow up with Dr. Rivero _____.     PRINCIPAL DISCHARGE DIAGNOSIS  Diagnosis: Bacteremia  Assessment and Plan of Treatment: You presented to the hospital with fevers, chills and fatigue. You were found to have bacteria in your blood stream (Methicillin susceptible staphylococcus aureus). Your pacemaker was removed as a possible source of infection and you were treated with an antibiotic called Nafcillin. You had a gallium scan on 4/28 to further assess the source of infection. The results were negative for acute infections, which led to the conclusion that your pacemaker was infected. There will eventually be a plan to reimplant your pacemaker; please follow up with the EP team.   Please follow up with _____ to ensure your infection has resolved. Please follow up with electrophysiology, Dr. Rivero 5/13/25 at 12:10PM.  Please return to the hospital if you start to develop fevers, chills, fatigue, nausea, vomiting.      SECONDARY DISCHARGE DIAGNOSES  Diagnosis: Afib  Assessment and Plan of Treatment: You have an abnormal heart rhythm (arrhythmia) called atrial fibrillation. With this condition, the hearts 2 upper chambers (the atria) quiver rather than squeeze the blood out in a normal pattern. This leads to an irregular and sometimes rapid heartbeat. Atrial fibrillation is serious condition as it affects the heart’s ability to fill with blood as it should and blood clots may form, which increases the risk for stroke.  -Please CONTINUE  ELIQUIS 5MG TWICE A DAY to prevent a stroke.  -Please follow up with Dr. Rivero 5/13/25 at 12:10pm     PRINCIPAL DISCHARGE DIAGNOSIS  Diagnosis: Bacteremia  Assessment and Plan of Treatment: You presented to the hospital with fevers, chills and fatigue. You were found to have bacteria in your blood stream (Methicillin susceptible staphylococcus aureus).   --Your pacemaker was removed as a possible source of infection and you were treated with an antibiotic called Nafcillin. You had a gallium scan on 4/28 to further assess the source of infection. The results were negative for acute infections, which led to the conclusion that your pacemaker was infected. There will eventually be a plan to reimplant your pacemaker; please follow up with the EP team.   --Please follow up with infectious disease Dr. Mustafa in 2 weeks ([address], [address], [phone]), the office will reach out to you to schedule an appointment.  --You are recommended to have weekly labs: CMP, CBC, ESR, CRP and the results need to be faxed to ID office at [phone].  --Please also follow up with electrophysiology, Dr. Rivero 5/13/25 at 12:10PM.  --Avoid strenuous activity or heavy lifting anything more than 10lbs for the next five days  --Please keep your incision site dry for 5 days then you may shower and pat it dry.   --For any bleeding or hematoma formation (hardened blood collection under the skin) at the access site please hold pressure and go to the emergency room  --If you experience any fever, chills, hot/cold sweats, increased fatigue please go to the closest ER as these can be signs of an infection.         SECONDARY DISCHARGE DIAGNOSES  Diagnosis: Afib  Assessment and Plan of Treatment: You have an abnormal heart rhythm (arrhythmia) called atrial fibrillation. With this condition, the hearts 2 upper chambers (the atria) quiver rather than squeeze the blood out in a normal pattern. This leads to an irregular and sometimes rapid heartbeat. Atrial fibrillation is serious condition as it affects the heart’s ability to fill with blood as it should and blood clots may form, which increases the risk for stroke.  -Please CONTINUE  ELIQUIS 5MG TWICE A DAY to prevent a stroke.  -Please follow up with Dr. Rivero 5/13/25 at 12:10pm     PRINCIPAL DISCHARGE DIAGNOSIS  Diagnosis: Bacteremia  Assessment and Plan of Treatment: You presented to the hospital with fevers, chills and fatigue. You were found to have bacteria in your blood stream (Methicillin susceptible staphylococcus aureus).   --Your pacemaker was removed as a possible source of infection and you were treated with an antibiotic called Nafcillin. You had a gallium scan on 4/28 to further assess the source of infection. The results were negative for acute infections, which led to the conclusion that your pacemaker was infected. There will eventually be a plan to reimplant your pacemaker.   --Continue: Nafcillin 2g IV q 4 hours until June 8th, 2025 for completion of 6 weeks antibiotic course.  --You are recommended to have weekly labs: CMP, CBC, ESR, CRP and the results need to be faxed to ID office at [phone].  --Follow up with infectious disease Dr. Mustaaf in 2 weeks ([address], [address], [phone]), the office will reach out to you to schedule an appointment.  --Please also follow up with electrophysiology, Dr. Rivero 5/13/25 at 12:10PM.  --Avoid strenuous activity or heavy lifting anything more than 10lbs for the next five days  --Please keep your incision site dry for 5 days then you may shower and pat it dry.   --For any bleeding or hematoma formation (hardened blood collection under the skin) at the access site please hold pressure and go to the emergency room  --If you experience any fever, chills, hot/cold sweats, increased fatigue please go to the closest ER as these can be signs of an infection.         SECONDARY DISCHARGE DIAGNOSES  Diagnosis: Afib  Assessment and Plan of Treatment: You have an abnormal heart rhythm (arrhythmia) called atrial fibrillation. With this condition, the hearts 2 upper chambers (the atria) quiver rather than squeeze the blood out in a normal pattern. This leads to an irregular and sometimes rapid heartbeat. Atrial fibrillation is serious condition as it affects the heart’s ability to fill with blood as it should and blood clots may form, which increases the risk for stroke.  -Please CONTINUE  ELIQUIS 5MG TWICE A DAY to prevent a stroke.  -Please follow up with Dr. Rivero 5/13/25 at 12:10pm    Diagnosis: Hypertrophic cardiomyopathy  Assessment and Plan of Treatment: Hypertrophic cardiomyopathy is a condition in which the heart muscle thickens, which makes it harder for the heart to pump blood.  --Please continue to follow with Advanced Heart failure/cardiologist Dr. Bib Sapp.     PRINCIPAL DISCHARGE DIAGNOSIS  Diagnosis: Bacteremia  Assessment and Plan of Treatment: You presented to the hospital with fevers, chills and fatigue. You were found to have bacteria in your blood stream (Methicillin susceptible staphylococcus aureus).   --Your pacemaker was removed as a possible source of infection and you were treated with an antibiotic called Nafcillin. You had a gallium scan on 4/28 to further assess the source of infection. The results were negative for acute infections, which led to the conclusion that your pacemaker was infected. There will eventually be a plan to reimplant your pacemaker.   --Continue: Nafcillin 12g IV over 24 hour continous infusion until June 8th, 2025 for completion of 6 weeks antibiotic course.  --You are recommended to have weekly labs: CMP, CBC, ESR, CRP and the results need to be faxed to ID office at [phone].  --Follow up with infectious disease Dr. Mustafa in 2 weeks ([address], [address], [phone]), the office will reach out to you to schedule an appointment.  --Please also follow up with electrophysiology, Dr. Rivero 5/13/25 at 12:10PM.  -- Please also follow up with Dr Lopez on 5/6/25 at 2:20pm  --Avoid strenuous activity or heavy lifting anything more than 10lbs for the next five days  --Please keep your incision site dry for 5 days then you may shower and pat it dry.   --For any bleeding or hematoma formation (hardened blood collection under the skin) at the access site please hold pressure and go to the emergency room  --If you experience any fever, chills, hot/cold sweats, increased fatigue please go to the closest ER as these can be signs of an infection.         SECONDARY DISCHARGE DIAGNOSES  Diagnosis: Afib  Assessment and Plan of Treatment: You have an abnormal heart rhythm (arrhythmia) called atrial fibrillation. With this condition, the hearts 2 upper chambers (the atria) quiver rather than squeeze the blood out in a normal pattern. This leads to an irregular and sometimes rapid heartbeat. Atrial fibrillation is serious condition as it affects the heart’s ability to fill with blood as it should and blood clots may form, which increases the risk for stroke.  -Please CONTINUE  ELIQUIS 5MG TWICE A DAY to prevent a stroke.  -Please follow up with Dr. Rivero 5/13/25 at 12:10pm    Diagnosis: Hypertrophic cardiomyopathy  Assessment and Plan of Treatment: Hypertrophic cardiomyopathy is a condition in which the heart muscle thickens, which makes it harder for the heart to pump blood.  -- Please START taking losartan 25mg daily for blood pressure.  --Please continue to follow with Advanced Heart failure/cardiologist Dr. Bib Sapp.

## 2025-04-28 NOTE — DISCHARGE NOTE PROVIDER - ATTENDING DISCHARGE PHYSICAL EXAMINATION:
Mr. Kennedy is a 64M with history of HCM, HTN, paroxysmal atrial fibrillation (Eliquis, last dose 4/23), recent CHB with PPM dual chamber in Jan 2025 presented initially to [facility] with fevers, body aches and admitted for sepsis with MSSA bacteremia. Sent to [facility] for device removal in setting of persistent fevers/ positive blood cultures.     Exam:   NAD  JVP normal  Lungs CTA  2/6 systolic murmur  WWP, no edema      MSSA bacteremia- ID consulted,  Nafcillin. Device removed. Cultures cleared from Sunday and now cleared for PICC line, 6 week abx. Has follow up for EP, ID and HF.   PPM- Device removed. Micra until cleared for re-implant. 6 week abx   HCM- Follows with HF Sekou, no reported gradients. Avoid hypovolemia.

## 2025-04-28 NOTE — PROGRESS NOTE ADULT - ASSESSMENT
64M w/ PMHx of HTN, HLD, HCM (follows w/ Dr. Sapp), pAFib (on Eliquis, last dose 4/23 PM), hx of CHB (s/p PPM 1/2025) who initially presented to [facility] with fevers, body aches, fatigue. Pt found to be septic and w/ BCx/UCx (+) for MSSA. Of note, patient had root canal 2wks prior to presentation. Per verbal report to RN (documentation was not transferred with the patient), TTE was concerning for aortic valve vegetation but subsequent DONNY was negative. At this time concern that infection source is patient's PPM, now s/p PPM exaction and Micra placement. Plan for gallium scan today. Possible transfer to [facility].

## 2025-04-28 NOTE — PROGRESS NOTE ADULT - SUBJECTIVE AND OBJECTIVE BOX
EPS Progress Note  CC: bacteremia    S: still weak.    Had EP procedure last friday.    Going to Gallium scan     O: T(C): 37.5 (04-28-25 @ 08:45), Max: 37.6 (04-27-25 @ 21:44)  HR: 66 (04-28-25 @ 08:45) (52 - 66)  BP: 137/74 (04-28-25 @ 08:45) (137/65 - 156/71)  RR: 18 (04-28-25 @ 08:45) (18 - 19)  SpO2: 97% (04-28-25 @ 08:45) (95% - 97%)    TELE:  NSR with       PHYSICAL  Constitutional:  NAD        Pulm:  CTA B/L  Cardiac:   + s1/s2, RRR.  Left upper chest wall wound with dermabond. No hematoma.   GI:  +BS , soft ND/NT  Vascular: right groin wound without bleeding or hematoma. Dressing on.   Neuro: AAO x 3. no focal deficit  Skin: Warm. No rash or lesion     LABS:                        9.2    13.97 )-----------( 521      ( 28 Apr 2025 05:30 )             27.1     04-28    133[L]  |  98  |  33[H]  ----------------------------<  122[H]  4.0   |  26  |  1.87[H]    Ca    7.4[L]      28 Apr 2025 05:30  Phos  3.2     04-28  Mg     2.3     04-28    TPro  5.6[L]  /  Alb  1.9[L]  /  TBili  0.6  /  DBili  x   /  AST  40  /  ALT  28  /  AlkPhos  83  04-28    Culture - Wound Aerobic/Anaerobic (04.25.25 @ 16:56)   Specimen Source: Pacemaker Pacemaker  Culture Results: No growth to date.  4/26/25: Blood culture: Growth in anaerobic bottle: Gram Positive Cocci in Clusters     MEDICATIONS:  acetaminophen     Tablet .. 650 milliGRAM(s) Oral every 6 hours PRN  ALPRAZolam 0.25 milliGRAM(s) Oral every 8 hours PRN  apixaban 5 milliGRAM(s) Oral every 12 hours  clonazePAM  Tablet 0.5 milliGRAM(s) Oral at bedtime  nafcillin  IVPB      nafcillin  IVPB 2 Gram(s) IV Intermittent every 4 hours  oxyCODONE    IR 5 milliGRAM(s) Oral every 6 hours PRN  pantoprazole    Tablet 40 milliGRAM(s) Oral before breakfast  polyethylene glycol 3350 17 Gram(s) Oral two times a day PRN  senna 2 Tablet(s) Oral at bedtime  sodium chloride 0.9%. 1000 milliLiter(s) IV Continuous <Continuous>

## 2025-04-28 NOTE — PROGRESS NOTE ADULT - PROBLEM SELECTOR PLAN 3
Cr at transferring hospital was 1.34, today 1.87  - pt has not been drinking and eating as much  - PCA at bedside to assist with feeding   - continue to monitor

## 2025-04-28 NOTE — PROGRESS NOTE ADULT - PROBLEM SELECTOR PLAN 6
Hx of AFib (was noted on PPM that was placed recently)  - s/p Heparin gtt  - Per Dr. Angulo, ok to restart Eliquis (4/27/25)     F: none  E: Replete K < 4, Mg < 2  N: DASH/TLC  DVTppx: Eliquis   GIppx: Pantoprazole  PT/OT: consulted    Dispo: pending clinical progression

## 2025-04-28 NOTE — BH CONSULTATION LIAISON PROGRESS NOTE - NSBHASSESSMENTFT_PSY_ALL_CORE
Mr. Kennedy is a 64 year old male, privately domiciled with wife, PPHx of anxiety, no prior psychiatric hospitalizations, no known SA, PMHx of Afib, congenital heart block, admitted with sepsis and TTE concerning for aortic valve vegetation, now s/p pacemaker replacement. Psychiatry consulted for SI.    On initial exam pt endorsed passive SI without plan or intent in the setting of frustration with prolonged hospitalization and isolation from family and friends. On re-evaluation yesterday and today, pt reports any mood disturbance and SI has totally resolved now that he has support of his wife, who was visiting today. He is future oriented, and amenable to continued medical hospitalization. Clinical diagnosis most likely for adjustment disorder with anxiety.    RECOMMENDATIONS:  - Does not require 1:1; denying SI   - Can continue home Klonopin 0.5mg qhs for anxiety (pt reports it is prescribed by his PCP)  - no psychiatric contraindications to discharge when medically ready  - Please reach out to psychiatry if any additional questions or concerns arise. 
Mr. Kennedy is a 64 year old male, privately domiciled with wife, PPHx of anxiety, no prior psychiatric hospitalizations, no known SA, PMHx of Afib, congenital heart block, admitted with sepsis and TTE concerning for aortic valve vegetation, now s/p pacemaker replacement. Psychiatry consulted for SI.    On initial exam yesterday, patient was irritable, linear, in good behavioral control. He endorsed passive SI without plan or intent in the setting of frustration with prolonged hospitalization and isolation from family and friends. On re-evaluation today, pt reports any mood disturbance and SI has totally resolved now that he has support of his wife, who was visiting today. He is future oriented, and amenable to continued hospitalization, including plan for upcoming gallium scan. Clinical diagnosis most likely for adjustment disorder with anxiety.    - discontinue 1:1 due to resolution of SI and mood disturbance   - continue home Klonopin 0.5mg qhs for anxiety  - no psychiatric contraindications to discharge

## 2025-04-29 LAB
ANION GAP SERPL CALC-SCNC: 9 MMOL/L — SIGNIFICANT CHANGE UP (ref 5–17)
BUN SERPL-MCNC: 32 MG/DL — HIGH (ref 7–23)
CALCIUM SERPL-MCNC: 7.5 MG/DL — LOW (ref 8.4–10.5)
CHLORIDE SERPL-SCNC: 100 MMOL/L — SIGNIFICANT CHANGE UP (ref 96–108)
CO2 SERPL-SCNC: 24 MMOL/L — SIGNIFICANT CHANGE UP (ref 22–31)
CREAT SERPL-MCNC: 1.74 MG/DL — HIGH (ref 0.5–1.3)
CULTURE RESULTS: ABNORMAL
CULTURE RESULTS: SIGNIFICANT CHANGE UP
EGFR: 44 ML/MIN/1.73M2 — LOW
EGFR: 44 ML/MIN/1.73M2 — LOW
GLUCOSE SERPL-MCNC: 129 MG/DL — HIGH (ref 70–99)
HCT VFR BLD CALC: 26.6 % — LOW (ref 39–50)
HGB BLD-MCNC: 9.2 G/DL — LOW (ref 13–17)
MAGNESIUM SERPL-MCNC: 2.4 MG/DL — SIGNIFICANT CHANGE UP (ref 1.6–2.6)
MCHC RBC-ENTMCNC: 32.5 PG — SIGNIFICANT CHANGE UP (ref 27–34)
MCHC RBC-ENTMCNC: 34.6 G/DL — SIGNIFICANT CHANGE UP (ref 32–36)
MCV RBC AUTO: 94 FL — SIGNIFICANT CHANGE UP (ref 80–100)
NRBC BLD AUTO-RTO: 0 /100 WBCS — SIGNIFICANT CHANGE UP (ref 0–0)
PLATELET # BLD AUTO: 577 K/UL — HIGH (ref 150–400)
POTASSIUM SERPL-MCNC: 3.8 MMOL/L — SIGNIFICANT CHANGE UP (ref 3.5–5.3)
POTASSIUM SERPL-SCNC: 3.8 MMOL/L — SIGNIFICANT CHANGE UP (ref 3.5–5.3)
RBC # BLD: 2.83 M/UL — LOW (ref 4.2–5.8)
RBC # FLD: 14.3 % — SIGNIFICANT CHANGE UP (ref 10.3–14.5)
SODIUM SERPL-SCNC: 133 MMOL/L — LOW (ref 135–145)
SPECIMEN SOURCE: SIGNIFICANT CHANGE UP
SPECIMEN SOURCE: SIGNIFICANT CHANGE UP
WBC # BLD: 12.11 K/UL — HIGH (ref 3.8–10.5)
WBC # FLD AUTO: 12.11 K/UL — HIGH (ref 3.8–10.5)

## 2025-04-29 PROCEDURE — 99232 SBSQ HOSP IP/OBS MODERATE 35: CPT

## 2025-04-29 RX ADMIN — APIXABAN 5 MILLIGRAM(S): 2.5 TABLET, FILM COATED ORAL at 18:52

## 2025-04-29 RX ADMIN — NAFCILLIN 200 GRAM(S): 2 INJECTION, SOLUTION INTRAVENOUS at 01:19

## 2025-04-29 RX ADMIN — CLONAZEPAM 0.5 MILLIGRAM(S): 0.5 TABLET ORAL at 22:01

## 2025-04-29 RX ADMIN — NAFCILLIN 200 GRAM(S): 2 INJECTION, SOLUTION INTRAVENOUS at 18:51

## 2025-04-29 RX ADMIN — NAFCILLIN 200 GRAM(S): 2 INJECTION, SOLUTION INTRAVENOUS at 09:33

## 2025-04-29 RX ADMIN — NAFCILLIN 200 GRAM(S): 2 INJECTION, SOLUTION INTRAVENOUS at 14:27

## 2025-04-29 RX ADMIN — Medication 650 MILLIGRAM(S): at 05:29

## 2025-04-29 RX ADMIN — Medication 10 MILLIEQUIVALENT(S): at 09:29

## 2025-04-29 RX ADMIN — Medication 650 MILLIGRAM(S): at 06:29

## 2025-04-29 RX ADMIN — Medication 40 MILLIGRAM(S): at 05:30

## 2025-04-29 RX ADMIN — Medication 650 MILLIGRAM(S): at 16:38

## 2025-04-29 RX ADMIN — NAFCILLIN 200 GRAM(S): 2 INJECTION, SOLUTION INTRAVENOUS at 05:31

## 2025-04-29 RX ADMIN — NAFCILLIN 200 GRAM(S): 2 INJECTION, SOLUTION INTRAVENOUS at 22:01

## 2025-04-29 RX ADMIN — APIXABAN 5 MILLIGRAM(S): 2.5 TABLET, FILM COATED ORAL at 05:29

## 2025-04-29 RX ADMIN — Medication 0.25 MILLIGRAM(S): at 16:38

## 2025-04-29 NOTE — PROGRESS NOTE ADULT - PROBLEM SELECTOR PLAN 5
RESOLVED   RN informed provider that  told her that pt wanted to "end it all" and wants to "take 4 oxycodone and not wake up"  - Provider went to assess pt  - Pt states he is unhappy in this hospital. States he is unable to walk now and just a few weeks ago he was able to run 8 miles. Additionally states he is unable to do the things he used to do, such as feeding himself and opening a bottle of water. He further states that he feels he should "go to sleep and not wake up" and feels that "everything should end."  - 1:1 was ordered (now off)   - Psych was consulted, pending recs  - Pt's room was changed so he would have a window view  - PT/OT consulted  - Family was updated at bedside and agreed with the plan (1:1, Psych consult, room change, PT/OT).    ##Pt preference to transfer to [facility]  - Pt's family requested transfer to [facility] because they were unhappy with his room   - Per the transfer center, pt would need insurance auth that typically takes 24 hours, pt will have a high co-pay as well as be responsible for the cost of the ambulance   - After further discussion with pt and family, they decided he will stay until Monday for the Gallium scan

## 2025-04-29 NOTE — PROGRESS NOTE ADULT - PROBLEM SELECTOR PLAN 1
Presented to [facility] 4/17 w/ fever/chills/fatigue, found to be septic w/ BCx/UCx (+) for MSSA  - Per verbal report (documentation not transferred with patient), TTE was concerning for aortic valve vegetation, but DONNY was negative for vegetation   - 2wks prior to presentation pt had root canal  - Concern that PPM could be source of infection s/p PPM extraction and Micra placement   - gallium scan 4/28: mildly increased uptake overlying the coccyx, diffue uptake in the colon probably normal, lower legs and feet are off the field of view.              o Upon exam of coccyx, no indications of active infection   - ID following: c/w Nafcillin 2g Q4H and daily blood culture           o  Blood cultures + from 4/25, 4/26  for gram + cocci   - single lumen PICC to be inserted Thursday 5/1 (per PICC team, no need to hold Eliquis prior to procedure)   - EP signed off

## 2025-04-29 NOTE — PROGRESS NOTE ADULT - PROBLEM SELECTOR PLAN 3
Cr at transferring hospital was 1.34, today 1.74  - pt has not been drinking and eating as much  - PCA at bedside to assist with feeding   - continue to monitor

## 2025-04-29 NOTE — PROGRESS NOTE ADULT - ASSESSMENT
63yo M w/  HTN, HLD, HCM (follows w/ Dr. Sapp), pAFib (on Eliquis, last dose 4/23 , cervical neck surgery (?fusion) 30 years ago, hx of CHB (s/p PPM 1/2025) who initially presented to [facility] with fevers, body aches, fatigue found to have MSSA BSI and suspected PPM infection transferred to [facility] for further management and removal of PPM. Patient febrile on arrival with leukocytosis 23. S/p PPM extraction 4/25, now has leadless Micra device. Gallium scan showed uptake at coccyx - clinically there is no evidence on infection. Gallium also showed activity in bowel -- patient has been constipated. Per primary team, patient pending possible transfer to [facility] to be closer to home.       Recommendations:  - F/u BCx from 4/24 -- NGTD   - F/u BCx from 4/25-- MSSA 1/4 bottles   - F/u BCx from 4/26-- MSSA 2/4 bottles   - F/u Bcx 4/27 - NGTD 24 hours   - F/u BCx 4/28 in lab   - F/u OR PPM cultures --ngtd   - Continue nafcillin 2g IV Q4h  - Anticipate 6 weeks of IV antibiotics   - Would like to see bacteremia cleared for at least 72 hours before discharge      Team 2 will follow you.    Case d/w primary team.  Final recommendation pending attending note.    Yue Noe, Infectious Diseases PA  Please reach out for any questions 9 am-5pm.   For evenings and weekends, please call the ID physician on call.

## 2025-04-29 NOTE — PROGRESS NOTE ADULT - ASSESSMENT
64M w/ PMHx of HTN, HLD, HCM (follows w/ Dr. Sapp), pAFib (on Eliquis, last dose 4/23 PM), hx of CHB (s/p PPM 1/2025) who initially presented to [facility] with fevers, body aches, fatigue. Pt found to be septic and w/ BCx/UCx (+) for MSSA. Of note, patient had root canal 2wks prior to presentation. Per verbal report to RN (documentation was not transferred with the patient), TTE was concerning for aortic valve vegetation but subsequent DONNY was negative. At this time concern that infection source is patient's PPM, now s/p PPM exaction and Micra placement. ID following. Plan for PICC line and tentative discharge on Thursday.

## 2025-04-29 NOTE — PROGRESS NOTE ADULT - PROBLEM SELECTOR PLAN 2
- Pt has HCM, follows w/ Dr. Bib Sapp.   - TTE (4/24/25):  LVEF 60 %. No RWMA. LA severely dilated. Mild- mod MR. Fibrocalcific aortic valve sclerosis w/o stenosis, turbulent flow is seen in the LVOT.  PASP 22 mmHg.  - no BB/CCB given prior intolerance and AV block  - hold mavacamten until at least next outpt visit

## 2025-04-29 NOTE — PROCEDURE NOTE - ADDITIONAL PROCEDURE DETAILS
Primary team requesting venous access multiple unsuccessful attempts at PIV, R arm not available for use due to lymph edema. L arm  significantly swollen w/ weeping and edema. I was able to find a suitable vein above area of skin irritation

## 2025-04-29 NOTE — PROGRESS NOTE ADULT - NS ATTEND AMEND GEN_ALL_CORE FT
Mr. Kennedy is a 64M with history of HCM, HTN, paroxysmal atrial fibrillation (Eliquis, last dose 4/23), recent CHB with PPM dual chamber in Jan 2025 presented initially to [facility] with fevers, body aches and admitted for sepsis with MSSA bacteremia. Sent to [facility] for device removal in setting of persistent fevers/ positive blood cultures.     Exam:   NAD  JVP normal  Lungs CTA  2/6 systolic murmur  WWP, no edema      MSSA bacteremia- ID consulted,  Nafcillin. Device removed. Cultures cleared from Sunday. Follow up   PPM- Device removed. Micra for time being  HCM- Follows with HF Sekou, no reported gradients. Avoid hypovolemia.    Dispo- If blood cultures remain negative, will plan for PICC thursday and discharge with follow up EP+ ID.

## 2025-04-29 NOTE — PROGRESS NOTE ADULT - ATTENDING COMMENTS
Agree with above.  Patient with high grade MSSA bacteremia.  Continue Nafcillin 2 grams IV q4hrs.  Will need 6 week course with day 1 being first negative blood culture.  Follow up blood cultures from 4/27 and 4/28.  Would hold off on placing PICC line until blood culture from 4/27 is negative at 72hrs (likely updated on 5/1/25).
Agree with above.  MSSA bacteremia of unclear etiology.  Concern for device infection.  Agree with plans for device removal given persistent bacteremia.  Continue Nafcillin 2 grams IV q4hrs.  Follow up blood cultures.  Follow up NM gallium scan. ID team 2 will follow.  Dr. Mullins will cover me this weekend. I will return on 4/28/25
He is s/p PPM extraction yesterday.  Reports pain at prior PPM site, otherwise feling ok.  Tolerating nafcillin.  Cont nafcillin 2g IV q4h for MSSA bacteremia with PPM infection.  f/u OR PPM extraction culture.  f/u gallium scan.  f/u BCx.    Team 2 will follow you.  Dr Mustafa will resume care on Monday.  Case d/w primary team.    Vilma Mullins MD, MS  Infectious Disease attending  office phone [phone]  For any questions during evening/weekend/holiday, please page ID on call

## 2025-04-29 NOTE — PROGRESS NOTE ADULT - SUBJECTIVE AND OBJECTIVE BOX
Interventional Cardiology PA Adult Progress Note    Subjective Assessment:    ROS negative except as noted above.  	  MEDICATIONS:    nafcillin  IVPB      nafcillin  IVPB 2 Gram(s) IV Intermittent every 4 hours      acetaminophen     Tablet .. 650 milliGRAM(s) Oral every 6 hours PRN  ALPRAZolam 0.25 milliGRAM(s) Oral every 8 hours PRN  clonazePAM  Tablet 0.5 milliGRAM(s) Oral at bedtime  oxyCODONE    IR 5 milliGRAM(s) Oral every 6 hours PRN    pantoprazole    Tablet 40 milliGRAM(s) Oral before breakfast  polyethylene glycol 3350 17 Gram(s) Oral two times a day PRN  senna 2 Tablet(s) Oral at bedtime      apixaban 5 milliGRAM(s) Oral every 12 hours  sodium chloride 0.9%. 1000 milliLiter(s) IV Continuous <Continuous>      	    [PHYSICAL EXAM:  TELEMETRY:  T(C): 37.6 (04-29-25 @ 05:24), Max: 37.6 (04-28-25 @ 16:46)  HR: 60 (04-29-25 @ 05:24) (51 - 76)  BP: 139/72 (04-29-25 @ 05:24) (137/74 - 166/72)  RR: 17 (04-29-25 @ 05:24) (17 - 19)  SpO2: 95% (04-29-25 @ 05:24) (94% - 98%)  Wt(kg): --  I&O's Summary    28 Apr 2025 07:01  -  29 Apr 2025 07:00  --------------------------------------------------------  IN: 600 mL / OUT: 875 mL / NET: -275 mL                                              Appearance: Normal	  HEENT:   Normal oral mucosa, PERRLA, EOMI	  Neck: Supple, + JVD/ - JVD; Carotid Bruit   Cardiovascular: Normal S1 S2, No JVD, No murmurs,   Respiratory: Lungs clear to auscultation/Decreased Breath Sounds/No Rales, Rhonchi, Wheezing	  Gastrointestinal:  Soft, Non-tender, + BS	  Skin: No rashes, No ecchymoses, No cyanosis  Extremities: Normal range of motion, No clubbing, cyanosis or edema  Vascular: Peripheral pulses palpable 2+ bilaterally  Neurologic: Non-focal  Psychiatry: A & O x 3, Mood & affect appropriate      	    ECG:  	  RADIOLOGY:   DIAGNOSTIC TESTING:  [ ] Echocardiogram:   [ ]  Catheterization:  [ ] Stress Test:    [ ] DONNY  OTHER: 	    LABS:	 	  CARDIAC MARKERS:                          9.2    12.11 )-----------( 577      ( 29 Apr 2025 06:25 )             26.6     04-29    133[L]  |  100  |  32[H]  ----------------------------<  129[H]  3.8   |  24  |  1.74[H]    Ca    7.5[L]      29 Apr 2025 06:24  Phos  3.2     04-28  Mg     2.4     04-29    TPro  5.6[L]  /  Alb  1.9[L]  /  TBili  0.6  /  DBili  x   /  AST  40  /  ALT  28  /  AlkPhos  83  04-28    proBNP:   Lipid Profile:   HgA1c:   TSH:    Cardiology PA Adult Progress Note    Subjective Assessment: Seen and examined bedside. Denies chest pain, palpitations, SOB, orthopnea/PND, dizziness, LOC, n/v/d, fever/chills/sick contacts, LE edema. No acute events overnight     ROS negative except as noted above.  	  MEDICATIONS:    nafcillin  IVPB      nafcillin  IVPB 2 Gram(s) IV Intermittent every 4 hours      acetaminophen     Tablet .. 650 milliGRAM(s) Oral every 6 hours PRN  ALPRAZolam 0.25 milliGRAM(s) Oral every 8 hours PRN  clonazePAM  Tablet 0.5 milliGRAM(s) Oral at bedtime  oxyCODONE    IR 5 milliGRAM(s) Oral every 6 hours PRN    pantoprazole    Tablet 40 milliGRAM(s) Oral before breakfast  polyethylene glycol 3350 17 Gram(s) Oral two times a day PRN  senna 2 Tablet(s) Oral at bedtime      apixaban 5 milliGRAM(s) Oral every 12 hours  sodium chloride 0.9%. 1000 milliLiter(s) IV Continuous <Continuous>      	    [PHYSICAL EXAM:  TELEMETRY:  T(C): 37.6 (04-29-25 @ 05:24), Max: 37.6 (04-28-25 @ 16:46)  HR: 60 (04-29-25 @ 05:24) (51 - 76)  BP: 139/72 (04-29-25 @ 05:24) (137/74 - 166/72)  RR: 17 (04-29-25 @ 05:24) (17 - 19)  SpO2: 95% (04-29-25 @ 05:24) (94% - 98%)  Wt(kg): --  I&O's Summary    28 Apr 2025 07:01  -  29 Apr 2025 07:00  --------------------------------------------------------  IN: 600 mL / OUT: 875 mL / NET: -275 mL                                              Appearance: Normal	  HEENT:   Normal oral mucosa, PERRLA, EOMI	  Neck: Supple, - JVD;   Cardiovascular: Normal S1 S2, No JVD, No murmurs,   Respiratory: Lungs clear to auscultation  Gastrointestinal:  Soft, Non-tender, + BS	  Skin: No rashes, No ecchymoses, No cyanosis  Extremities: Normal range of motion, No clubbing, cyanosis or edema  Vascular: Peripheral pulses palpable 2+ bilaterally  Neurologic: Non-focal  Psychiatry: A & O x 3, Mood & affect appropriate      	    ECG:  	  RADIOLOGY:   DIAGNOSTIC TESTING:  [ ] Echocardiogram:   [ ]  Catheterization:  [ ] Stress Test:    [ ] DONNY  OTHER: 	    LABS:	 	  CARDIAC MARKERS:                          9.2    12.11 )-----------( 577      ( 29 Apr 2025 06:25 )             26.6     04-29    133[L]  |  100  |  32[H]  ----------------------------<  129[H]  3.8   |  24  |  1.74[H]    Ca    7.5[L]      29 Apr 2025 06:24  Phos  3.2     04-28  Mg     2.4     04-29    TPro  5.6[L]  /  Alb  1.9[L]  /  TBili  0.6  /  DBili  x   /  AST  40  /  ALT  28  /  AlkPhos  83  04-28    proBNP:   Lipid Profile:   HgA1c:   TSH:    Cardiology PA Adult Progress Note    Subjective Assessment: Seen and examined bedside. Denies chest pain, palpitations, SOB, orthopnea/PND, dizziness, LOC, n/v/d, fever/chills/sick contacts, LE edema. No acute events overnight.     ROS negative except as noted above.  	  MEDICATIONS:    nafcillin  IVPB      nafcillin  IVPB 2 Gram(s) IV Intermittent every 4 hours      acetaminophen     Tablet .. 650 milliGRAM(s) Oral every 6 hours PRN  ALPRAZolam 0.25 milliGRAM(s) Oral every 8 hours PRN  clonazePAM  Tablet 0.5 milliGRAM(s) Oral at bedtime  oxyCODONE    IR 5 milliGRAM(s) Oral every 6 hours PRN    pantoprazole    Tablet 40 milliGRAM(s) Oral before breakfast  polyethylene glycol 3350 17 Gram(s) Oral two times a day PRN  senna 2 Tablet(s) Oral at bedtime      apixaban 5 milliGRAM(s) Oral every 12 hours  sodium chloride 0.9%. 1000 milliLiter(s) IV Continuous <Continuous>      	    [PHYSICAL EXAM:  TELEMETRY:  T(C): 37.6 (04-29-25 @ 05:24), Max: 37.6 (04-28-25 @ 16:46)  HR: 60 (04-29-25 @ 05:24) (51 - 76)  BP: 139/72 (04-29-25 @ 05:24) (137/74 - 166/72)  RR: 17 (04-29-25 @ 05:24) (17 - 19)  SpO2: 95% (04-29-25 @ 05:24) (94% - 98%)  Wt(kg): --  I&O's Summary    28 Apr 2025 07:01  -  29 Apr 2025 07:00  --------------------------------------------------------  IN: 600 mL / OUT: 875 mL / NET: -275 mL                                              Appearance: Normal	  HEENT:   Normal oral mucosa, PERRLA, EOMI	  Neck: Supple, - JVD;   Cardiovascular: Normal S1 S2, No JVD, No murmurs,   Respiratory: Lungs clear to auscultation  Gastrointestinal:  Soft, Non-tender, + BS	  Skin: No rashes, No ecchymoses, No cyanosis  Extremities: Normal range of motion, No clubbing, cyanosis or edema  Vascular: Peripheral pulses palpable 2+ bilaterally  Neurologic: Non-focal  Psychiatry: A & O x 3, Mood & affect appropriate      	    ECG:  	  RADIOLOGY:   DIAGNOSTIC TESTING:  [ ] Echocardiogram:   [ ]  Catheterization:  [ ] Stress Test:    [ ] DONNY  OTHER: 	    LABS:	 	  CARDIAC MARKERS:                          9.2    12.11 )-----------( 577      ( 29 Apr 2025 06:25 )             26.6     04-29    133[L]  |  100  |  32[H]  ----------------------------<  129[H]  3.8   |  24  |  1.74[H]    Ca    7.5[L]      29 Apr 2025 06:24  Phos  3.2     04-28  Mg     2.4     04-29    TPro  5.6[L]  /  Alb  1.9[L]  /  TBili  0.6  /  DBili  x   /  AST  40  /  ALT  28  /  AlkPhos  83  04-28    proBNP:   Lipid Profile:   HgA1c:   TSH:    Cardiology PA Adult Progress Note    Subjective Assessment: Seen and examined bedside. Denies chest pain, palpitations, SOB, orthopnea/PND, dizziness, LOC, n/v/d, fever/chills/sick contacts, LE edema. No acute events overnight.     ROS negative except as noted above.  	  MEDICATIONS:  nafcillin  IVPB      nafcillin  IVPB 2 Gram(s) IV Intermittent every 4 hours  acetaminophen     Tablet .. 650 milliGRAM(s) Oral every 6 hours PRN  ALPRAZolam 0.25 milliGRAM(s) Oral every 8 hours PRN  clonazePAM  Tablet 0.5 milliGRAM(s) Oral at bedtime  oxyCODONE    IR 5 milliGRAM(s) Oral every 6 hours PRN  pantoprazole    Tablet 40 milliGRAM(s) Oral before breakfast  polyethylene glycol 3350 17 Gram(s) Oral two times a day PRN  senna 2 Tablet(s) Oral at bedtime  apixaban 5 milliGRAM(s) Oral every 12 hours  sodium chloride 0.9%. 1000 milliLiter(s) IV Continuous <Continuous>      	    [PHYSICAL EXAM:  TELEMETRY:  T(C): 37.6 (04-29-25 @ 05:24), Max: 37.6 (04-28-25 @ 16:46)  HR: 60 (04-29-25 @ 05:24) (51 - 76)  BP: 139/72 (04-29-25 @ 05:24) (137/74 - 166/72)  RR: 17 (04-29-25 @ 05:24) (17 - 19)  SpO2: 95% (04-29-25 @ 05:24) (94% - 98%)  Wt(kg): --  I&O's Summary    28 Apr 2025 07:01  -  29 Apr 2025 07:00  --------------------------------------------------------  IN: 600 mL / OUT: 875 mL / NET: -275 mL                                              Appearance: Normal	  HEENT:   Normal oral mucosa, PERRLA, EOMI	  Neck: Supple, - JVD;   Cardiovascular: Normal S1 S2, No JVD, No murmurs,   Respiratory: Lungs clear to auscultation  Gastrointestinal:  Soft, Non-tender, + BS	  Skin: No rashes, No ecchymoses, No cyanosis  Extremities: Normal range of motion, No clubbing, cyanosis or edema  Vascular: Peripheral pulses palpable 2+ bilaterally  Neurologic: Non-focal  Psychiatry: A & O x 3, Mood & affect appropriate      	    ECG:  	  RADIOLOGY:   DIAGNOSTIC TESTING:  [ ] Echocardiogram:   [ ]  Catheterization:  [ ] Stress Test:    [ ] DONNY  OTHER: 	    LABS:	 	  CARDIAC MARKERS:                          9.2    12.11 )-----------( 577      ( 29 Apr 2025 06:25 )             26.6     04-29    133[L]  |  100  |  32[H]  ----------------------------<  129[H]  3.8   |  24  |  1.74[H]    Ca    7.5[L]      29 Apr 2025 06:24  Phos  3.2     04-28  Mg     2.4     04-29    TPro  5.6[L]  /  Alb  1.9[L]  /  TBili  0.6  /  DBili  x   /  AST  40  /  ALT  28  /  AlkPhos  83  04-28    proBNP:   Lipid Profile:   HgA1c:   TSH:

## 2025-04-29 NOTE — PROGRESS NOTE ADULT - SUBJECTIVE AND OBJECTIVE BOX
INFECTIOUS DISEASES CONSULT FOLLOW-UP NOTE    INTERVAL HPI/OVERNIGHT EVENTS:    Patient seen and examined at bedside. DEANNE. Afebrile.     ROS:   Constitutional, eyes, ENT, cardiovascular, respiratory, gastrointestinal, genitourinary, integumentary, neurological, psychiatric and heme/lymph are otherwise negative other than noted above       ANTIBIOTICS/RELEVANT:    MEDICATIONS  (STANDING):  apixaban 5 milliGRAM(s) Oral every 12 hours  clonazePAM  Tablet 0.5 milliGRAM(s) Oral at bedtime  nafcillin  IVPB      nafcillin  IVPB 2 Gram(s) IV Intermittent every 4 hours  pantoprazole    Tablet 40 milliGRAM(s) Oral before breakfast  senna 2 Tablet(s) Oral at bedtime  sodium chloride 0.9%. 1000 milliLiter(s) (100 mL/Hr) IV Continuous <Continuous>    MEDICATIONS  (PRN):  acetaminophen     Tablet .. 650 milliGRAM(s) Oral every 6 hours PRN Temp greater or equal to 38C (100.4F), Mild Pain (1 - 3), Moderate Pain (4 - 6)  ALPRAZolam 0.25 milliGRAM(s) Oral every 8 hours PRN anxiety  oxyCODONE    IR 5 milliGRAM(s) Oral every 6 hours PRN Severe Pain (7 - 10)  polyethylene glycol 3350 17 Gram(s) Oral two times a day PRN Constipation        Vital Signs Last 24 Hrs  T(C): 36.8 (29 Apr 2025 08:50), Max: 37.6 (28 Apr 2025 16:46)  T(F): 98.2 (29 Apr 2025 08:50), Max: 99.7 (28 Apr 2025 16:46)  HR: 52 (29 Apr 2025 08:50) (51 - 76)  BP: 149/71 (29 Apr 2025 08:50) (139/72 - 166/72)  BP(mean): 102 (29 Apr 2025 08:50) (100 - 103)  RR: 18 (29 Apr 2025 08:50) (17 - 19)  SpO2: 94% (29 Apr 2025 08:50) (94% - 98%)    Parameters below as of 29 Apr 2025 08:50  Patient On (Oxygen Delivery Method): room air        04-28-25 @ 07:01  -  04-29-25 @ 07:00  --------------------------------------------------------  IN: 600 mL / OUT: 875 mL / NET: -275 mL      PHYSICAL EXAM:  Constitutional: alert, NAD  Eyes: the sclera and conjunctiva were normal.   ENT: the ears and nose were normal in appearance.   Neck: the appearance of the neck was normal and the neck was supple.   chest L upper chest with bandage   Pulmonary: no respiratory distress and symmetric chest rise   Abdomen: soft, non-tender      LABS:                        9.2    12.11 )-----------( 577      ( 29 Apr 2025 06:25 )             26.6     04-29    133[L]  |  100  |  32[H]  ----------------------------<  129[H]  3.8   |  24  |  1.74[H]    Ca    7.5[L]      29 Apr 2025 06:24  Phos  3.2     04-28  Mg     2.4     04-29    TPro  5.6[L]  /  Alb  1.9[L]  /  TBili  0.6  /  DBili  x   /  AST  40  /  ALT  28  /  AlkPhos  83  04-28      Urinalysis Basic - ( 29 Apr 2025 06:24 )    Color: x / Appearance: x / SG: x / pH: x  Gluc: 129 mg/dL / Ketone: x  / Bili: x / Urobili: x   Blood: x / Protein: x / Nitrite: x   Leuk Esterase: x / RBC: x / WBC x   Sq Epi: x / Non Sq Epi: x / Bacteria: x        MICROBIOLOGY:    Culture - Blood (collected 04-27-25 @ 10:05)  Source: Blood Blood  Preliminary Report (04-28-25 @ 17:01):    No growth at 24 hours    Culture - Blood (collected 04-26-25 @ 18:03)  Source: Blood Blood-Peripheral  Gram Stain (04-28-25 @ 07:12):    Growth in aerobic bottle: Gram Positive Cocci in Clusters  Final Report (04-28-25 @ 22:11):    Growth in aerobic bottle: Staphylococcus aureus See previous culture    [id number]    Culture - Blood (collected 04-26-25 @ 18:03)  Source: Blood Blood-Peripheral  Gram Stain (04-28-25 @ 10:13):    Growth in anaerobic bottle: Gram Positive Cocci in Clusters  Final Report (04-29-25 @ 07:43):    Growth in anaerobic bottle: Staphylococcus aureus    See previous culture [id number]    Culture - Blood (collected 04-25-25 @ 20:36)  Source: Blood Blood-Peripheral  Preliminary Report (04-29-25 @ 01:01):    No growth at 72 Hours    Culture - Blood (collected 04-25-25 @ 20:36)  Source: Blood Blood-Peripheral  Gram Stain (04-27-25 @ 06:10):    Growth in anaerobic bottle: Gram Positive Cocci in Clusters  Final Report (04-28-25 @ 15:53):    Growth in anaerobic bottle: Staphylococcus aureus    Direct identification is available within approximately 3-5    hours either by Blood Panel Multiplexed PCR or Direct    MALDI-TOF. Details: [email or web address]  Organism: Blood Culture PCR  Staphylococcus aureus (04-28-25 @ 15:53)  Organism: Staphylococcus aureus (04-28-25 @ 15:53)      -  Clindamycin: R 0.5 This isolate is presumed to be clindamycin resistant based on detection of inducible resistance. Clindamycin may still be effective in some patients.      -  Oxacillin: S 0.5 Oxacillin predicts susceptibility for dicloxacillin, methicillin, and nafcillin      -  Gentamicin: S <=4 Should not be used as monotherapy      -  Vancomycin: S 1      -  Tetracycline: S <=4      Method Type: CHUYITA      -  Penicillin: R >2      -  Rifampin: S <=1 Should not be used as monotherapy      -  Erythromycin: R >4      -  Trimethoprim/Sulfamethoxazole: S <=0.5/9.5  Organism: Blood Culture PCR (04-28-25 @ 15:53)      Method Type: PCR      -  Methicillin SENSITIVE Staphylococcus aureus (MSSA): Detec Any isolate of Staphylococcus aureus from a blood culture is NOT considered a contaminant.    Culture - Wound Aerobic/Anaerobic (collected 04-25-25 @ 16:56)  Source: Surgical Swab Surgical Swab  Preliminary Report (04-27-25 @ 07:22):    No growth to date    Culture - Wound Aerobic/Anaerobic (collected 04-25-25 @ 16:56)  Source: Pacemaker Pacemaker  Preliminary Report (04-27-25 @ 13:27):    No growth to date.    Urinalysis with Rflx Culture (collected 04-24-25 @ 03:07)    Culture - Urine (collected 04-24-25 @ 03:07)  Source: Clean Catch None  Final Report (04-25-25 @ 16:22):    No growth    Culture - Blood (collected 04-24-25 @ 03:04)  Source: Blood Blood  Preliminary Report (04-28-25 @ 12:01):    No growth at 4 days        RADIOLOGY & ADDITIONAL STUDIES:  Reviewed

## 2025-04-30 LAB
ANION GAP SERPL CALC-SCNC: 9 MMOL/L — SIGNIFICANT CHANGE UP (ref 5–17)
BUN SERPL-MCNC: 33 MG/DL — HIGH (ref 7–23)
CALCIUM SERPL-MCNC: 7.4 MG/DL — LOW (ref 8.4–10.5)
CHLORIDE SERPL-SCNC: 102 MMOL/L — SIGNIFICANT CHANGE UP (ref 96–108)
CO2 SERPL-SCNC: 23 MMOL/L — SIGNIFICANT CHANGE UP (ref 22–31)
CREAT SERPL-MCNC: 1.77 MG/DL — HIGH (ref 0.5–1.3)
EGFR: 43 ML/MIN/1.73M2 — LOW
EGFR: 43 ML/MIN/1.73M2 — LOW
GLUCOSE SERPL-MCNC: 110 MG/DL — HIGH (ref 70–99)
HCT VFR BLD CALC: 29.6 % — LOW (ref 39–50)
HGB BLD-MCNC: 9.9 G/DL — LOW (ref 13–17)
MAGNESIUM SERPL-MCNC: 2.4 MG/DL — SIGNIFICANT CHANGE UP (ref 1.6–2.6)
MCHC RBC-ENTMCNC: 32.5 PG — SIGNIFICANT CHANGE UP (ref 27–34)
MCHC RBC-ENTMCNC: 33.4 G/DL — SIGNIFICANT CHANGE UP (ref 32–36)
MCV RBC AUTO: 97 FL — SIGNIFICANT CHANGE UP (ref 80–100)
NRBC BLD AUTO-RTO: 0 /100 WBCS — SIGNIFICANT CHANGE UP (ref 0–0)
PLATELET # BLD AUTO: 617 K/UL — HIGH (ref 150–400)
POTASSIUM SERPL-MCNC: 4.1 MMOL/L — SIGNIFICANT CHANGE UP (ref 3.5–5.3)
POTASSIUM SERPL-SCNC: 4.1 MMOL/L — SIGNIFICANT CHANGE UP (ref 3.5–5.3)
RBC # BLD: 3.05 M/UL — LOW (ref 4.2–5.8)
RBC # FLD: 14.1 % — SIGNIFICANT CHANGE UP (ref 10.3–14.5)
SODIUM SERPL-SCNC: 134 MMOL/L — LOW (ref 135–145)
WBC # BLD: 11.65 K/UL — HIGH (ref 3.8–10.5)
WBC # FLD AUTO: 11.65 K/UL — HIGH (ref 3.8–10.5)

## 2025-04-30 PROCEDURE — 99232 SBSQ HOSP IP/OBS MODERATE 35: CPT

## 2025-04-30 RX ADMIN — NAFCILLIN 200 GRAM(S): 2 INJECTION, SOLUTION INTRAVENOUS at 22:18

## 2025-04-30 RX ADMIN — Medication 650 MILLIGRAM(S): at 20:12

## 2025-04-30 RX ADMIN — NAFCILLIN 200 GRAM(S): 2 INJECTION, SOLUTION INTRAVENOUS at 17:56

## 2025-04-30 RX ADMIN — CLONAZEPAM 0.5 MILLIGRAM(S): 0.5 TABLET ORAL at 22:18

## 2025-04-30 RX ADMIN — Medication 650 MILLIGRAM(S): at 13:52

## 2025-04-30 RX ADMIN — NAFCILLIN 200 GRAM(S): 2 INJECTION, SOLUTION INTRAVENOUS at 09:58

## 2025-04-30 RX ADMIN — NAFCILLIN 200 GRAM(S): 2 INJECTION, SOLUTION INTRAVENOUS at 06:08

## 2025-04-30 RX ADMIN — Medication 650 MILLIGRAM(S): at 12:52

## 2025-04-30 RX ADMIN — Medication 650 MILLIGRAM(S): at 00:39

## 2025-04-30 RX ADMIN — Medication 650 MILLIGRAM(S): at 21:12

## 2025-04-30 RX ADMIN — APIXABAN 5 MILLIGRAM(S): 2.5 TABLET, FILM COATED ORAL at 17:56

## 2025-04-30 RX ADMIN — Medication 0.25 MILLIGRAM(S): at 00:39

## 2025-04-30 RX ADMIN — Medication 40 MILLIGRAM(S): at 06:08

## 2025-04-30 RX ADMIN — Medication 650 MILLIGRAM(S): at 01:47

## 2025-04-30 RX ADMIN — NAFCILLIN 200 GRAM(S): 2 INJECTION, SOLUTION INTRAVENOUS at 13:33

## 2025-04-30 RX ADMIN — APIXABAN 5 MILLIGRAM(S): 2.5 TABLET, FILM COATED ORAL at 06:07

## 2025-04-30 RX ADMIN — NAFCILLIN 200 GRAM(S): 2 INJECTION, SOLUTION INTRAVENOUS at 01:59

## 2025-04-30 NOTE — PROGRESS NOTE ADULT - NS ATTEND AMEND GEN_ALL_CORE FT
Mr. Kennedy is a 64M with history of HCM, HTN, paroxysmal atrial fibrillation (Eliquis, last dose 4/23), recent CHB with PPM dual chamber in Jan 2025 presented initially to [facility] with fevers, body aches and admitted for sepsis with MSSA bacteremia. Sent to [facility] for device removal in setting of persistent fevers/ positive blood cultures.     Exam:   NAD  JVP normal  Lungs CTA  2/6 systolic murmur  WWP, no edema      MSSA bacteremia- ID consulted,  Nafcillin. Device removed. Cultures cleared from Sunday. Plan for PICC tomorrow.  PPM- Device removed. Micra for time being  HCM- Follows with HF Sekou, no reported gradients. Avoid hypovolemia.  HTN- add low dose losartan    Dispo- PICC tomorrow and discharge with follow up EP+ ID/home infusions

## 2025-04-30 NOTE — PROGRESS NOTE ADULT - SUBJECTIVE AND OBJECTIVE BOX
Interventional Cardiology PA Adult Progress Note    Subjective Assessment: Seen and examined bedside. Denies chest pain, palpitations, SOB, orthopnea/PND, dizziness, LOC, n/v/d, fever/chills/sick contacts, LE edema.     ROS negative except as noted above.  	  MEDICATIONS:    nafcillin  IVPB      nafcillin  IVPB 2 Gram(s) IV Intermittent every 4 hours  acetaminophen     Tablet .. 650 milliGRAM(s) Oral every 6 hours PRN  ALPRAZolam 0.25 milliGRAM(s) Oral every 8 hours PRN  clonazePAM  Tablet 0.5 milliGRAM(s) Oral at bedtime  oxyCODONE    IR 5 milliGRAM(s) Oral every 6 hours PRN  pantoprazole    Tablet 40 milliGRAM(s) Oral before breakfast  polyethylene glycol 3350 17 Gram(s) Oral two times a day PRN  senna 2 Tablet(s) Oral at bedtime  apixaban 5 milliGRAM(s) Oral every 12 hours  sodium chloride 0.9%. 1000 milliLiter(s) IV Continuous <Continuous>      	    PHYSICAL EXAM:  TELEMETRY:  T(C): 36.7 (04-30-25 @ 08:17), Max: 37.4 (04-29-25 @ 20:38)  HR: 52 (04-30-25 @ 08:17) (50 - 60)  BP: 167/74 (04-30-25 @ 08:17) (146/66 - 167/77)  RR: 16 (04-30-25 @ 08:17) (16 - 20)  SpO2: 96% (04-30-25 @ 08:17) (95% - 98%)  Wt(kg): --  I&O's Summary    29 Apr 2025 07:01  -  30 Apr 2025 07:00  --------------------------------------------------------  IN: 960 mL / OUT: 250 mL / NET: 710 mL    30 Apr 2025 07:01  -  30 Apr 2025 11:36  --------------------------------------------------------  IN: 0 mL / OUT: 300 mL / NET: -300 mL                                              Appearance: Normal	  HEENT:   Normal oral mucosa, PERRLA, EOMI	  Neck: Supple, + JVD/ - JVD; Carotid Bruit   Cardiovascular: Normal S1 S2, No JVD, No murmurs,   Respiratory: Lungs clear to auscultation/Decreased Breath Sounds/No Rales, Rhonchi, Wheezing	  Gastrointestinal:  Soft, Non-tender, + BS	  Skin: No rashes, No ecchymoses, No cyanosis  Extremities: Normal range of motion, No clubbing, cyanosis or edema  Vascular: Peripheral pulses palpable 2+ bilaterally  Neurologic: Non-focal  Psychiatry: A & O x 3, Mood & affect appropriate               9.9    11.65 )-----------( 617      ( 30 Apr 2025 05:30 )             29.6     04-30    134[L]  |  102  |  33[H]  ----------------------------<  110[H]  4.1   |  23  |  1.77[H]    Ca    7.4[L]      30 Apr 2025 05:30  Mg     2.4     04-30

## 2025-04-30 NOTE — PROGRESS NOTE ADULT - PROBLEM SELECTOR PLAN 5
RESOLVED   RN informed provider that  told her that pt wanted to "end it all" and wants to "take 4 oxycodone and not wake up"  - Provider went to assess pt  - Pt states he is unhappy in this hospital. States he is unable to walk now and just a few weeks ago he was able to run 8 miles. Additionally states he is unable to do the things he used to do, such as feeding himself and opening a bottle of water. He further states that he feels he should "go to sleep and not wake up" and feels that "everything should end."  - 1:1 was ordered (now off)   - Psych was consulted, pending recs  - Pt's room was changed so he would have a window view  - PT/OT consulted  - Family was updated at bedside and agreed with the plan (1:1, Psych consult, room change, PT/OT)

## 2025-04-30 NOTE — PROGRESS NOTE ADULT - PROBLEM SELECTOR PLAN 2
hx HOCM, follows w/ Dr. Bib Sapp.   - TTE (4/24/25):  LVEF 60 %. No RWMA. LA severely dilated. Mild- mod MR. Fibrocalcific aortic valve sclerosis w/o stenosis, turbulent flow is seen in the LVOT.  PASP 22 mmHg.  - no BB/CCB given prior intolerance and AV block  - hold mavacamten until at least next outpt visit

## 2025-04-30 NOTE — PROGRESS NOTE ADULT - PROBLEM SELECTOR PROBLEM 3
Chronic kidney disease (CKD)
Chronic kidney disease (CKD)
H/O hypertrophic cardiomyopathy
Chronic kidney disease (CKD)
Chronic kidney disease (CKD)
H/O hypertrophic cardiomyopathy
Chronic kidney disease (CKD)

## 2025-04-30 NOTE — PROGRESS NOTE ADULT - SUBJECTIVE AND OBJECTIVE BOX
INFECTIOUS DISEASES CONSULT FOLLOW-UP NOTE    INTERVAL HPI/OVERNIGHT EVENTS:    Patient seen and examined at bedside. DEANNE. Afebrile. Endorses fatigue. Had one episode of loose stool last night (has not taken bowel regimen since 4/26). Denies abdominal pain       ROS:   Constitutional, eyes, ENT, cardiovascular, respiratory, gastrointestinal, genitourinary, integumentary, neurological, psychiatric and heme/lymph are otherwise negative other than noted above       ANTIBIOTICS/RELEVANT:    MEDICATIONS  (STANDING):  apixaban 5 milliGRAM(s) Oral every 12 hours  clonazePAM  Tablet 0.5 milliGRAM(s) Oral at bedtime  nafcillin  IVPB      nafcillin  IVPB 2 Gram(s) IV Intermittent every 4 hours  pantoprazole    Tablet 40 milliGRAM(s) Oral before breakfast  senna 2 Tablet(s) Oral at bedtime  sodium chloride 0.9%. 1000 milliLiter(s) (100 mL/Hr) IV Continuous <Continuous>    MEDICATIONS  (PRN):  acetaminophen     Tablet .. 650 milliGRAM(s) Oral every 6 hours PRN Temp greater or equal to 38C (100.4F), Mild Pain (1 - 3), Moderate Pain (4 - 6)  ALPRAZolam 0.25 milliGRAM(s) Oral every 8 hours PRN anxiety  oxyCODONE    IR 5 milliGRAM(s) Oral every 6 hours PRN Severe Pain (7 - 10)  polyethylene glycol 3350 17 Gram(s) Oral two times a day PRN Constipation        Vital Signs Last 24 Hrs  T(C): 36.7 (30 Apr 2025 08:17), Max: 37.4 (29 Apr 2025 20:38)  T(F): 98 (30 Apr 2025 08:17), Max: 99.4 (29 Apr 2025 20:38)  HR: 52 (30 Apr 2025 08:17) (50 - 60)  BP: 167/74 (30 Apr 2025 08:17) (146/66 - 167/77)  BP(mean): 107 (30 Apr 2025 08:17) (95 - 115)  RR: 16 (30 Apr 2025 08:17) (16 - 20)  SpO2: 96% (30 Apr 2025 08:17) (95% - 98%)    Parameters below as of 30 Apr 2025 08:17  Patient On (Oxygen Delivery Method): room air        04-29-25 @ 07:01  -  04-30-25 @ 07:00  --------------------------------------------------------  IN: 960 mL / OUT: 250 mL / NET: 710 mL    04-30-25 @ 07:01  -  04-30-25 @ 10:09  --------------------------------------------------------  IN: 0 mL / OUT: 300 mL / NET: -300 mL      PHYSICAL EXAM:  Constitutional: alert, NAD  Eyes: the sclera and conjunctiva were normal.   ENT: the ears and nose were normal in appearance.   Neck: the appearance of the neck was normal and the neck was supple.   chest L upper chest with bandage  Heart: RRR    Pulmonary: no respiratory distress and CTA b/l   Abdomen: soft, non-tender            LABS:                        9.9    11.65 )-----------( 617      ( 30 Apr 2025 05:30 )             29.6     04-30    134[L]  |  102  |  33[H]  ----------------------------<  110[H]  4.1   |  23  |  1.77[H]    Ca    7.4[L]      30 Apr 2025 05:30  Mg     2.4     04-30        Urinalysis Basic - ( 30 Apr 2025 05:30 )    Color: x / Appearance: x / SG: x / pH: x  Gluc: 110 mg/dL / Ketone: x  / Bili: x / Urobili: x   Blood: x / Protein: x / Nitrite: x   Leuk Esterase: x / RBC: x / WBC x   Sq Epi: x / Non Sq Epi: x / Bacteria: x        MICROBIOLOGY:    Culture - Blood (collected 04-28-25 @ 22:18)  Source: Blood Blood-Venous  Preliminary Report (04-30-25 @ 02:02):    No growth at 24 hours    Culture - Blood (collected 04-28-25 @ 16:16)  Source: Blood Blood-Venous  Preliminary Report (04-29-25 @ 22:01):    No growth at 24 hours    Culture - Blood (collected 04-27-25 @ 10:05)  Source: Blood Blood  Preliminary Report (04-29-25 @ 17:01):    No growth at 48 Hours    Culture - Blood (collected 04-26-25 @ 18:03)  Source: Blood Blood-Peripheral  Gram Stain (04-28-25 @ 07:12):    Growth in aerobic bottle: Gram Positive Cocci in Clusters  Final Report (04-28-25 @ 22:11):    Growth in aerobic bottle: Staphylococcus aureus See previous culture    [id number]    Culture - Blood (collected 04-26-25 @ 18:03)  Source: Blood Blood-Peripheral  Gram Stain (04-28-25 @ 10:13):    Growth in anaerobic bottle: Gram Positive Cocci in Clusters  Final Report (04-29-25 @ 07:43):    Growth in anaerobic bottle: Staphylococcus aureus    See previous culture [id number]    Culture - Blood (collected 04-25-25 @ 20:36)  Source: Blood Blood-Peripheral  Preliminary Report (04-30-25 @ 01:01):    No growth at 4 days    Culture - Blood (collected 04-25-25 @ 20:36)  Source: Blood Blood-Peripheral  Gram Stain (04-27-25 @ 06:10):    Growth in anaerobic bottle: Gram Positive Cocci in Clusters  Final Report (04-28-25 @ 15:53):    Growth in anaerobic bottle: Staphylococcus aureus    Direct identification is available within approximately 3-5    hours either by Blood Panel Multiplexed PCR or Direct    MALDI-TOF. Details: [email or web address]  Organism: Blood Culture PCR  Staphylococcus aureus (04-28-25 @ 15:53)  Organism: Staphylococcus aureus (04-28-25 @ 15:53)      -  Clindamycin: R 0.5 This isolate is presumed to be clindamycin resistant based on detection of inducible resistance. Clindamycin may still be effective in some patients.      -  Oxacillin: S 0.5 Oxacillin predicts susceptibility for dicloxacillin, methicillin, and nafcillin      -  Gentamicin: S <=4 Should not be used as monotherapy      -  Vancomycin: S 1      -  Tetracycline: S <=4      Method Type: CHUYITA      -  Penicillin: R >2      -  Rifampin: S <=1 Should not be used as monotherapy      -  Erythromycin: R >4      -  Trimethoprim/Sulfamethoxazole: S <=0.5/9.5  Organism: Blood Culture PCR (04-28-25 @ 15:53)      Method Type: PCR      -  Methicillin SENSITIVE Staphylococcus aureus (MSSA): Detec Any isolate of Staphylococcus aureus from a blood culture is NOT considered a contaminant.    Culture - Wound Aerobic/Anaerobic (collected 04-25-25 @ 16:56)  Source: Surgical Swab Surgical Swab  Preliminary Report (04-27-25 @ 07:22):    No growth to date    Culture - Wound Aerobic/Anaerobic (collected 04-25-25 @ 16:56)  Source: Pacemaker Pacemaker  Preliminary Report (04-27-25 @ 13:27):    No growth to date.    Urinalysis with Rflx Culture (collected 04-24-25 @ 03:07)    Culture - Urine (collected 04-24-25 @ 03:07)  Source: Clean Catch None  Final Report (04-25-25 @ 16:22):    No growth    Culture - Blood (collected 04-24-25 @ 03:04)  Source: Blood Blood  Final Report (04-29-25 @ 12:01):    No growth at 5 days        RADIOLOGY & ADDITIONAL STUDIES:  Reviewed

## 2025-04-30 NOTE — PROGRESS NOTE ADULT - PROBLEM SELECTOR PROBLEM 2
Suicidal ideation
H/O hypertrophic cardiomyopathy
Suicidal ideation
H/O hypertrophic cardiomyopathy

## 2025-04-30 NOTE — PROGRESS NOTE ADULT - ASSESSMENT
63yo M PMHx HOCM, HTN, HLD, afib, CHB (s/p PPM 1/2025) transferred from [facility] to [facility] for sepsis with + BCx/UCx MSSA with DONNY negative for vegetations, PPM deemed source of infection s/p PPM extraction and Micra placement. Plan for PICC placement 5/1 and dc thereafter with home infusions.

## 2025-04-30 NOTE — PROGRESS NOTE ADULT - PROBLEM SELECTOR PLAN 6
Hx of AFib (was noted on PPM that was placed recently)  - s/p Heparin gtt    F: none  E: Replete K < 4, Mg < 2  N: DASH/TLC  DVTppx: Eliquis   GIppx: Pantoprazole  PT/OT: consulted  Dispo: Tentative d/c on Thursday after PICC

## 2025-04-30 NOTE — PROGRESS NOTE ADULT - PROBLEM SELECTOR PLAN 1
presetned to [facility] 4/17 septic w/ BCx/UCx (+) for MSSA  - Per verbal report (documentation not transferred with patient), TTE was concerning for aortic valve vegetation, but DONNY was negative for vegetation   - 2wks prior to presentation pt had root canal  - s/p PPM extraction and Micra placement   - gallium scan 4/28: mildly increased uptake overlying the coccyx, diffuse uptake in the colon probably normal, lower legs and feet are off the field of view.              o Upon exam of coccyx, no indications of active infection   - ID following: c/w Nafcillin 2g Q4H          o  Blood cultures negative since 4/27 (Abx active 6 weeks thereafter)

## 2025-04-30 NOTE — PROGRESS NOTE ADULT - PROBLEM SELECTOR PROBLEM 4
CHB (complete heart block)
Chronic kidney disease (CKD)
CHB (complete heart block)
Chronic kidney disease (CKD)

## 2025-04-30 NOTE — PROGRESS NOTE ADULT - NS ATTEND AMEND GEN_ALL_CORE FT
Agree with above.  Patient with MSSA bacteremia that has now cleared x 48hrs.  He will need 6 weeks of IV Nafcillin (end date is 6/7/25). Ok to place PICC once blood cultures from 4/27 are negative x 72hrs.   As an outpatient, he can receive Nafcillin as follows:  12 grams IV as a continuous infusion over 24hrs.   Weekly labs:  CBC with diff, CMP, ESR, CRP.  Fax to me: [phone]. ID team 2 will follow

## 2025-04-30 NOTE — PROGRESS NOTE ADULT - PROBLEM SELECTOR PROBLEM 5
CHB (complete heart block)
Suicidal ideation
CHB (complete heart block)
Afib
Afib
Suicidal ideation
Suicidal ideation

## 2025-04-30 NOTE — PROGRESS NOTE ADULT - ASSESSMENT
65yo M w/  HTN, HLD, HCM (follows w/ Dr. Sapp), pAFib (on Eliquis, last dose 4/23 , cervical neck surgery (?fusion) 30 years ago, hx of CHB (s/p PPM 1/2025) who initially presented to [facility] with fevers, body aches, fatigue found to have MSSA BSI and suspected PPM infection transferred to [facility] for further management and removal of PPM. Patient febrile on arrival with leukocytosis 23. S/p PPM extraction 4/25, now has leadless Micra device. Gallium scan showed uptake at coccyx - clinically there is no evidence on infection. Gallium also showed activity in bowel -- patient had been constipated.     Recommendations:  - F/u BCx from 4/24 -- NG  - F/u BCx from 4/25-- MSSA 1/4 bottles   - F/u BCx from 4/26-- MSSA 2/4 bottles   - F/u Bcx 4/27 - NGTD 48 hours   - F/u BCx 4/28 - NGTD 24 hours   - F/u OR PPM cultures --ngtd   - Continue nafcillin 2g IV Q4h  - Anticipate 6 weeks of IV antibiotics from day of first negative blood cultures   - Would like to see bacteremia cleared for at least 72 hours before discharge. Can plan on placing PICC line 5/1 once blood cultures from 4/27 are updated ngtd at 72 hours.       Team 2 will follow you.    Case d/w primary team.  Final recommendation pending attending note.    Yue Noe, Infectious Diseases PA  Please reach out for any questions 9 am-5pm.   For evenings and weekends, please call the ID physician on call.   65yo M w/  HTN, HLD, HCM (follows w/ Dr. Sapp), pAFib (on Eliquis, last dose 4/23 , cervical neck surgery (?fusion) 30 years ago, hx of CHB (s/p PPM 1/2025) who initially presented to [facility] with fevers, body aches, fatigue found to have MSSA BSI and suspected PPM infection transferred to [facility] for further management and removal of PPM. Patient febrile on arrival with leukocytosis 23. S/p PPM extraction 4/25, now has leadless Micra device. Gallium scan showed uptake at coccyx - clinically there is no evidence on infection. Gallium also showed activity in bowel -- patient had been constipated.     Recommendations:  - F/u BCx from 4/24 -- NG  - F/u BCx from 4/25-- MSSA 1/4 bottles   - F/u BCx from 4/26-- MSSA 2/4 bottles   - F/u Bcx 4/27 - NGTD 48 hours   - F/u BCx 4/28 - NGTD 24 hours   - F/u OR PPM cultures --ngtd   - Continue nafcillin 2g IV Q4h. Can transition to 12g IV over 24 hours via continuous infusion upon discharge for ease of dosing at home.   - Anticipate 6 weeks of IV antibiotics from day of first negative blood cultures (tentatively 4/27-6/7)  - Would like to see bacteremia cleared for at least 72 hours before discharge. Can plan on placing single lumen PICC line 5/1 once blood cultures from 4/27 are updated ngtd at 72 hours.     - Weekly labs: CMP, CBC, ESR, CRP faxed to ID office at [phone]  - Patient to follow up with Dr. Mustafa in 2 weeks via [facility] ([address], [address], [phone]), ID office will call patient to schedule     Team 2 will follow you.    Case d/w primary team.  Final recommendation pending attending note.    Yue Noe, Infectious Diseases PA  Please reach out for any questions 9 am-5pm.   For evenings and weekends, please call the ID physician on call.

## 2025-04-30 NOTE — PROGRESS NOTE ADULT - PROBLEM SELECTOR PLAN 4
Cr at transferring hospital was 1.34, today 1.68  - pt has not been drinking and eating as much  - PCA at bedside to assist with feeding   - continue to monitor
Hx of CHB w/ recent PPM placed 1/2025 (Pt known to EP Dr. Rivero and Dr. Victor)  - pt with bacteriemia s/p PPM + Micra placement
Hx of CHB w/ recent PPM placed 1/2025 (Pt known to EP Dr. Rivero and Dr. Victor)  - pt with bacteriemia s/p PPM extraction and Micra placement w/ plan for replacement of his PPM in a few months
Hx of CHB w/ recent PPM placed 1/2025 (Pt known to EP Dr. Rivero and Dr. Victor)  - pt with bacteriemia s/p PPM extraction and Micra placement w/ plan for replacement of his PPM in a few months
Cr at transferring hospital was 1.34, today 1.68  - pt has not been drinking and eating as much  - PCA at bedside to assist with feeding   - continue to monitor
- Hx of CHB w/ recent PPM placed 1/2025.  - Pt known to EP Dr. Rivero and Dr. Victor
Hx of CHB w/ recent PPM placed 1/2025 (Pt known to EP Dr. Rivero and Dr. Victor)  - pt with bacteriemia s/p PPM extraction and Micra placement w/ plan for replacement of his PPM in a few months

## 2025-05-01 ENCOUNTER — TRANSCRIPTION ENCOUNTER (OUTPATIENT)
Age: 65
End: 2025-05-01

## 2025-05-01 VITALS
DIASTOLIC BLOOD PRESSURE: 66 MMHG | HEART RATE: 60 BPM | RESPIRATION RATE: 16 BRPM | SYSTOLIC BLOOD PRESSURE: 133 MMHG | OXYGEN SATURATION: 98 % | TEMPERATURE: 98 F

## 2025-05-01 LAB
ANION GAP SERPL CALC-SCNC: 9 MMOL/L — SIGNIFICANT CHANGE UP (ref 5–17)
BUN SERPL-MCNC: 32 MG/DL — HIGH (ref 7–23)
CALCIUM SERPL-MCNC: 7.7 MG/DL — LOW (ref 8.4–10.5)
CHLORIDE SERPL-SCNC: 102 MMOL/L — SIGNIFICANT CHANGE UP (ref 96–108)
CO2 SERPL-SCNC: 25 MMOL/L — SIGNIFICANT CHANGE UP (ref 22–31)
CREAT SERPL-MCNC: 1.7 MG/DL — HIGH (ref 0.5–1.3)
CULTURE RESULTS: SIGNIFICANT CHANGE UP
EGFR: 45 ML/MIN/1.73M2 — LOW
EGFR: 45 ML/MIN/1.73M2 — LOW
GLUCOSE SERPL-MCNC: 113 MG/DL — HIGH (ref 70–99)
HCT VFR BLD CALC: 27.5 % — LOW (ref 39–50)
HGB BLD-MCNC: 9.4 G/DL — LOW (ref 13–17)
MAGNESIUM SERPL-MCNC: 2.3 MG/DL — SIGNIFICANT CHANGE UP (ref 1.6–2.6)
MCHC RBC-ENTMCNC: 31.3 PG — SIGNIFICANT CHANGE UP (ref 27–34)
MCHC RBC-ENTMCNC: 34.2 G/DL — SIGNIFICANT CHANGE UP (ref 32–36)
MCV RBC AUTO: 91.7 FL — SIGNIFICANT CHANGE UP (ref 80–100)
NRBC BLD AUTO-RTO: 0 /100 WBCS — SIGNIFICANT CHANGE UP (ref 0–0)
PLATELET # BLD AUTO: 691 K/UL — HIGH (ref 150–400)
POTASSIUM SERPL-MCNC: 3.8 MMOL/L — SIGNIFICANT CHANGE UP (ref 3.5–5.3)
POTASSIUM SERPL-SCNC: 3.8 MMOL/L — SIGNIFICANT CHANGE UP (ref 3.5–5.3)
RBC # BLD: 3 M/UL — LOW (ref 4.2–5.8)
RBC # FLD: 13.8 % — SIGNIFICANT CHANGE UP (ref 10.3–14.5)
SODIUM SERPL-SCNC: 136 MMOL/L — SIGNIFICANT CHANGE UP (ref 135–145)
SPECIMEN SOURCE: SIGNIFICANT CHANGE UP
WBC # BLD: 11.58 K/UL — HIGH (ref 3.8–10.5)
WBC # FLD AUTO: 11.58 K/UL — HIGH (ref 3.8–10.5)

## 2025-05-01 PROCEDURE — 83036 HEMOGLOBIN GLYCOSYLATED A1C: CPT

## 2025-05-01 PROCEDURE — 36569 INSJ PICC 5 YR+ W/O IMAGING: CPT

## 2025-05-01 PROCEDURE — 86901 BLOOD TYPING SEROLOGIC RH(D): CPT

## 2025-05-01 PROCEDURE — 83735 ASSAY OF MAGNESIUM: CPT

## 2025-05-01 PROCEDURE — 87150 DNA/RNA AMPLIFIED PROBE: CPT

## 2025-05-01 PROCEDURE — 80053 COMPREHEN METABOLIC PANEL: CPT

## 2025-05-01 PROCEDURE — 81001 URINALYSIS AUTO W/SCOPE: CPT

## 2025-05-01 PROCEDURE — C1894: CPT

## 2025-05-01 PROCEDURE — 86900 BLOOD TYPING SEROLOGIC ABO: CPT

## 2025-05-01 PROCEDURE — 86850 RBC ANTIBODY SCREEN: CPT

## 2025-05-01 PROCEDURE — 97165 OT EVAL LOW COMPLEX 30 MIN: CPT

## 2025-05-01 PROCEDURE — 99232 SBSQ HOSP IP/OBS MODERATE 35: CPT

## 2025-05-01 PROCEDURE — 85025 COMPLETE CBC W/AUTO DIFF WBC: CPT

## 2025-05-01 PROCEDURE — 97116 GAIT TRAINING THERAPY: CPT

## 2025-05-01 PROCEDURE — 36415 COLL VENOUS BLD VENIPUNCTURE: CPT

## 2025-05-01 PROCEDURE — C1786: CPT

## 2025-05-01 PROCEDURE — 85027 COMPLETE CBC AUTOMATED: CPT

## 2025-05-01 PROCEDURE — 87070 CULTURE OTHR SPECIMN AEROBIC: CPT

## 2025-05-01 PROCEDURE — 85610 PROTHROMBIN TIME: CPT

## 2025-05-01 PROCEDURE — 93306 TTE W/DOPPLER COMPLETE: CPT

## 2025-05-01 PROCEDURE — 71045 X-RAY EXAM CHEST 1 VIEW: CPT

## 2025-05-01 PROCEDURE — 78802 RP LOCLZJ TUM WHBDY 1 D IMG: CPT | Mod: MC

## 2025-05-01 PROCEDURE — 87086 URINE CULTURE/COLONY COUNT: CPT

## 2025-05-01 PROCEDURE — 82550 ASSAY OF CK (CPK): CPT

## 2025-05-01 PROCEDURE — 99239 HOSP IP/OBS DSCHRG MGMT >30: CPT

## 2025-05-01 PROCEDURE — 84436 ASSAY OF TOTAL THYROXINE: CPT

## 2025-05-01 PROCEDURE — 97161 PT EVAL LOW COMPLEX 20 MIN: CPT

## 2025-05-01 PROCEDURE — 84443 ASSAY THYROID STIM HORMONE: CPT

## 2025-05-01 PROCEDURE — 80061 LIPID PANEL: CPT

## 2025-05-01 PROCEDURE — 87186 SC STD MICRODIL/AGAR DIL: CPT

## 2025-05-01 PROCEDURE — 80048 BASIC METABOLIC PNL TOTAL CA: CPT

## 2025-05-01 PROCEDURE — A9556: CPT

## 2025-05-01 PROCEDURE — 93005 ELECTROCARDIOGRAM TRACING: CPT

## 2025-05-01 PROCEDURE — 87040 BLOOD CULTURE FOR BACTERIA: CPT

## 2025-05-01 PROCEDURE — 97535 SELF CARE MNGMENT TRAINING: CPT

## 2025-05-01 PROCEDURE — 84100 ASSAY OF PHOSPHORUS: CPT

## 2025-05-01 PROCEDURE — 71046 X-RAY EXAM CHEST 2 VIEWS: CPT

## 2025-05-01 PROCEDURE — 36573 INSJ PICC RS&I 5 YR+: CPT

## 2025-05-01 PROCEDURE — 87075 CULTR BACTERIA EXCEPT BLOOD: CPT

## 2025-05-01 PROCEDURE — C1769: CPT

## 2025-05-01 PROCEDURE — 87077 CULTURE AEROBIC IDENTIFY: CPT

## 2025-05-01 PROCEDURE — 85730 THROMBOPLASTIN TIME PARTIAL: CPT

## 2025-05-01 RX ORDER — LOSARTAN POTASSIUM 100 MG/1
25 TABLET, FILM COATED ORAL DAILY
Refills: 0 | Status: DISCONTINUED | OUTPATIENT
Start: 2025-05-01 | End: 2025-05-01

## 2025-05-01 RX ORDER — ALPRAZOLAM 0.5 MG
1 TABLET, EXTENDED RELEASE 24 HR ORAL
Qty: 0 | Refills: 0 | DISCHARGE
Start: 2025-05-01

## 2025-05-01 RX ORDER — LOSARTAN POTASSIUM 100 MG/1
1 TABLET, FILM COATED ORAL
Qty: 30 | Refills: 3
Start: 2025-05-01 | End: 2025-08-28

## 2025-05-01 RX ORDER — LOSARTAN POTASSIUM 100 MG/1
1 TABLET, FILM COATED ORAL
Refills: 0 | DISCHARGE

## 2025-05-01 RX ORDER — APIXABAN 2.5 MG/1
1 TABLET, FILM COATED ORAL
Qty: 60 | Refills: 3
Start: 2025-05-01 | End: 2025-08-28

## 2025-05-01 RX ORDER — NAFCILLIN 2 G/100ML
1200 INJECTION, SOLUTION INTRAVENOUS
Qty: 0 | Refills: 0 | DISCHARGE
Start: 2025-05-01

## 2025-05-01 RX ORDER — LOSARTAN POTASSIUM 100 MG/1
25 TABLET, FILM COATED ORAL ONCE
Refills: 0 | Status: COMPLETED | OUTPATIENT
Start: 2025-05-01 | End: 2025-05-01

## 2025-05-01 RX ADMIN — Medication 40 MILLIGRAM(S): at 06:11

## 2025-05-01 RX ADMIN — NAFCILLIN 200 GRAM(S): 2 INJECTION, SOLUTION INTRAVENOUS at 02:12

## 2025-05-01 RX ADMIN — APIXABAN 5 MILLIGRAM(S): 2.5 TABLET, FILM COATED ORAL at 06:11

## 2025-05-01 RX ADMIN — Medication 650 MILLIGRAM(S): at 07:11

## 2025-05-01 RX ADMIN — Medication 650 MILLIGRAM(S): at 06:11

## 2025-05-01 RX ADMIN — NAFCILLIN 200 GRAM(S): 2 INJECTION, SOLUTION INTRAVENOUS at 09:57

## 2025-05-01 RX ADMIN — LOSARTAN POTASSIUM 25 MILLIGRAM(S): 100 TABLET, FILM COATED ORAL at 12:36

## 2025-05-01 RX ADMIN — NAFCILLIN 200 GRAM(S): 2 INJECTION, SOLUTION INTRAVENOUS at 06:11

## 2025-05-01 NOTE — DISCHARGE NOTE NURSING/CASE MANAGEMENT/SOCIAL WORK - NSDCPEFALRISK_GEN_ALL_CORE
For information on Fall & Injury Prevention, visit: [email or web address] OR  [email or web address] OR  https://www.cdc.gov/steadi/patient.html

## 2025-05-01 NOTE — PROGRESS NOTE ADULT - SUBJECTIVE AND OBJECTIVE BOX
Reason For Visit  Erlinda Mims is a(n) 72year CST    Electronically signed by : Dee Dukes M.D.; Oct 25 2018  1:23PM CST INFECTIOUS DISEASES CONSULT FOLLOW-UP NOTE    INTERVAL HPI/OVERNIGHT EVENTS:    Pt seen and examined at bedside. DEANNE. Afebrile.     ROS:   Constitutional, eyes, ENT, cardiovascular, respiratory, gastrointestinal, genitourinary, integumentary, neurological, psychiatric and heme/lymph are otherwise negative other than noted above       ANTIBIOTICS/RELEVANT:    MEDICATIONS  (STANDING):  apixaban 5 milliGRAM(s) Oral every 12 hours  clonazePAM  Tablet 0.5 milliGRAM(s) Oral at bedtime  losartan 25 milliGRAM(s) Oral daily  nafcillin  IVPB      nafcillin  IVPB 2 Gram(s) IV Intermittent every 4 hours  pantoprazole    Tablet 40 milliGRAM(s) Oral before breakfast  senna 2 Tablet(s) Oral at bedtime  sodium chloride 0.9%. 1000 milliLiter(s) (100 mL/Hr) IV Continuous <Continuous>    MEDICATIONS  (PRN):  acetaminophen     Tablet .. 650 milliGRAM(s) Oral every 6 hours PRN Temp greater or equal to 38C (100.4F), Mild Pain (1 - 3), Moderate Pain (4 - 6)  ALPRAZolam 0.25 milliGRAM(s) Oral every 8 hours PRN anxiety  oxyCODONE    IR 5 milliGRAM(s) Oral every 6 hours PRN Severe Pain (7 - 10)  polyethylene glycol 3350 17 Gram(s) Oral two times a day PRN Constipation  sodium chloride 0.9% lock flush 10 milliLiter(s) IV Push every 1 hour PRN Pre/post blood products, medications, blood draw, and to maintain line patency        Vital Signs Last 24 Hrs  T(C): 36.8 (01 May 2025 12:35), Max: 37.6 (30 Apr 2025 20:54)  T(F): 98.2 (01 May 2025 12:35), Max: 99.6 (30 Apr 2025 20:54)  HR: 60 (01 May 2025 12:35) (52 - 60)  BP: 133/66 (01 May 2025 12:35) (133/66 - 158/74)  BP(mean): 93 (01 May 2025 12:35) (93 - 107)  RR: 16 (01 May 2025 12:35) (16 - 18)  SpO2: 98% (01 May 2025 12:35) (95% - 98%)    Parameters below as of 01 May 2025 12:35  Patient On (Oxygen Delivery Method): room air        04-30-25 @ 07:01  -  05-01-25 @ 07:00  --------------------------------------------------------  IN: 880 mL / OUT: 600 mL / NET: 280 mL    05-01-25 @ 07:01  -  05-01-25 @ 12:46  --------------------------------------------------------  IN: 0 mL / OUT: 200 mL / NET: -200 mL      PHYSICAL EXAM:  Constitutional: alert, NAD  Eyes: the sclera and conjunctiva were normal.   ENT: the ears and nose were normal in appearance.   Neck: the appearance of the neck was normal and the neck was supple.   chest L upper chest with bandage  Heart: RRR    Pulmonary: no respiratory distress and CTA b/l   Abdomen: soft, non-tender      LABS:                        9.4    11.58 )-----------( 691      ( 01 May 2025 05:30 )             27.5     05-01    136  |  102  |  32[H]  ----------------------------<  113[H]  3.8   |  25  |  1.70[H]    Ca    7.7[L]      01 May 2025 05:30  Mg     2.3     05-01        Urinalysis Basic - ( 01 May 2025 05:30 )    Color: x / Appearance: x / SG: x / pH: x  Gluc: 113 mg/dL / Ketone: x  / Bili: x / Urobili: x   Blood: x / Protein: x / Nitrite: x   Leuk Esterase: x / RBC: x / WBC x   Sq Epi: x / Non Sq Epi: x / Bacteria: x        MICROBIOLOGY:    Culture - Blood (collected 04-28-25 @ 22:18)  Source: Blood Blood-Venous  Preliminary Report (05-01-25 @ 02:02):    No growth at 48 Hours    Culture - Blood (collected 04-28-25 @ 16:16)  Source: Blood Blood-Venous  Preliminary Report (04-30-25 @ 22:01):    No growth at 48 Hours    Culture - Blood (collected 04-27-25 @ 10:05)  Source: Blood Blood  Preliminary Report (04-30-25 @ 17:01):    No growth at 72 Hours    Culture - Blood (collected 04-26-25 @ 18:03)  Source: Blood Blood-Peripheral  Gram Stain (04-28-25 @ 07:12):    Growth in aerobic bottle: Gram Positive Cocci in Clusters  Final Report (04-28-25 @ 22:11):    Growth in aerobic bottle: Staphylococcus aureus See previous culture    [id number]    Culture - Blood (collected 04-26-25 @ 18:03)  Source: Blood Blood-Peripheral  Gram Stain (04-28-25 @ 10:13):    Growth in anaerobic bottle: Gram Positive Cocci in Clusters  Final Report (04-29-25 @ 07:43):    Growth in anaerobic bottle: Staphylococcus aureus    See previous culture [id number]    Culture - Blood (collected 04-25-25 @ 20:36)  Source: Blood Blood-Peripheral  Final Report (05-01-25 @ 01:01):    No growth at 5 days    Culture - Blood (collected 04-25-25 @ 20:36)  Source: Blood Blood-Peripheral  Gram Stain (04-27-25 @ 06:10):    Growth in anaerobic bottle: Gram Positive Cocci in Clusters  Final Report (04-28-25 @ 15:53):    Growth in anaerobic bottle: Staphylococcus aureus    Direct identification is available within approximately 3-5    hours either by Blood Panel Multiplexed PCR or Direct    MALDI-TOF. Details: [email or web address]  Organism: Blood Culture PCR  Staphylococcus aureus (04-28-25 @ 15:53)  Organism: Staphylococcus aureus (04-28-25 @ 15:53)      -  Clindamycin: R 0.5 This isolate is presumed to be clindamycin resistant based on detection of inducible resistance. Clindamycin may still be effective in some patients.      -  Oxacillin: S 0.5 Oxacillin predicts susceptibility for dicloxacillin, methicillin, and nafcillin      -  Gentamicin: S <=4 Should not be used as monotherapy      -  Vancomycin: S 1      -  Tetracycline: S <=4      Method Type: CHUYITA      -  Penicillin: R >2      -  Rifampin: S <=1 Should not be used as monotherapy      -  Erythromycin: R >4      -  Trimethoprim/Sulfamethoxazole: S <=0.5/9.5  Organism: Blood Culture PCR (04-28-25 @ 15:53)      Method Type: PCR      -  Methicillin SENSITIVE Staphylococcus aureus (MSSA): Detec Any isolate of Staphylococcus aureus from a blood culture is NOT considered a contaminant.    Culture - Wound Aerobic/Anaerobic (collected 04-25-25 @ 16:56)  Source: Surgical Swab Surgical Swab  Final Report (05-01-25 @ 06:53):    No growth at 5 days    Culture - Wound Aerobic/Anaerobic (collected 04-25-25 @ 16:56)  Source: Pacemaker Pacemaker  Final Report (05-01-25 @ 08:50):    No growth at 5 days        RADIOLOGY & ADDITIONAL STUDIES:  Reviewed

## 2025-05-01 NOTE — PROGRESS NOTE ADULT - PROVIDER SPECIALTY LIST ADULT
Cardiology
Electrophysiology
Infectious Disease
Infectious Disease
Cardiology
Electrophysiology
Infectious Disease
Cardiology
Cardiology

## 2025-05-01 NOTE — PROGRESS NOTE ADULT - ASSESSMENT
65yo M w/  HTN, HLD, HCM (follows w/ Dr. Sapp), pAFib (on Eliquis, last dose 4/23 , cervical neck surgery (?fusion) 30 years ago, hx of CHB (s/p PPM 1/2025) who initially presented to [facility] with fevers, body aches, fatigue found to have MSSA BSI and suspected PPM infection transferred to [facility] for further management and removal of PPM. Patient febrile on arrival with leukocytosis 23. S/p PPM extraction 4/25, now has leadless Micra device. Gallium scan showed uptake at coccyx - clinically there is no evidence on infection. Gallium also showed activity in bowel -- patient had been constipated.     Recommendations:  - F/u BCx from 4/24 -- NG  - F/u BCx from 4/25-- MSSA 1/4 bottles   - F/u BCx from 4/26-- MSSA 2/4 bottles   - F/u Bcx 4/27 - NGTD 72 hours   - F/u BCx 4/28 - NGTD 48 hours   - F/u OR PPM cultures --ngtd   - Continue nafcillin 2g IV Q4h. Can transition to 12g IV over 24 hours via continuous infusion upon discharge for ease of dosing at home.   - Duration is 6 weeks of IV antibiotics from day of first negative blood cultures (4/27-6/7)  - Place PICC line    - Weekly labs: CMP, CBC, ESR, CRP faxed to ID office at [phone]  - Patient to follow up with Dr. Mustafa in 2 weeks via [facility] ([address], [address], [phone]), ID office will call patient to schedule     Team 2 will sign off. Thank you for your consultation   Please recall if further ID input is desired    Case d/w primary team.  Final recommendation pending attending note.    Yue Noe, Infectious Diseases PA  Please reach out for any questions 9 am-5pm.   For evenings and weekends, please call the ID physician on call.

## 2025-05-01 NOTE — DISCHARGE NOTE NURSING/CASE MANAGEMENT/SOCIAL WORK - PATIENT PORTAL LINK FT
You can access the FollowMyHealth Patient Portal offered by [facility] by registering at the following website: [email or web address]. By joining [facility] FollowMyHealth portal, you will also be able to view your health information using other applications (apps) compatible with our system.
I have personally seen and examined the patient. I have collaborated with and supervised the

## 2025-05-01 NOTE — DISCHARGE NOTE NURSING/CASE MANAGEMENT/SOCIAL WORK - FINANCIAL ASSISTANCE
[facility] provides services at a reduced cost to those who are determined to be eligible through [facility]’s financial assistance program. Information regarding [facility]’s financial assistance program can be found by going to [email or web address] or by calling [phone]. no

## 2025-05-01 NOTE — PROCEDURE NOTE - NSPOSTCAREGUIDE_GEN_A_CORE
Verbal/written post procedure instructions were given to patient/caregiver/Care for catheter as per unit/ICU protocols
Verbal/written post procedure instructions were given to patient/caregiver/Instructed patient/caregiver regarding signs and symptoms of infection/Keep the cast/splint/dressing clean and dry/Care for catheter as per unit/ICU protocols

## 2025-05-01 NOTE — PROGRESS NOTE ADULT - NS ATTEND OPT1 GEN_ALL_CORE
I attest my time as attending is greater than 50% of the total combined time spent on qualifying patient care activities by the PA/NP and attending.
I independently performed the documented:
I attest my time as attending is greater than 50% of the total combined time spent on qualifying patient care activities by the PA/NP and attending.
I independently performed the documented:

## 2025-05-01 NOTE — PROGRESS NOTE ADULT - NS ATTEND AMEND GEN_ALL_CORE FT
Agree with above.  Patient has now cleared his bacteremia.  Given cardiac device involvement and high grade bacteremia, recommend 6 weeks of IV antibiotics.  Can continue Nafcillin 2 grams IV q4hrs (ends 6/7/25).  As an outpatient this can be given as 12 grams IV given as a continuous infusion over 24hrs.  Needs weekly CBC with diff, CMP, ESR, CRP.  Fax to me:  [phone].  He can follow up with me in a couple weeks. ID office will arrange follow up

## 2025-05-01 NOTE — PROGRESS NOTE ADULT - TIME BILLING
MSSA bacteremia, PPM infection.
treatment of MSSA bacteremia
MSSA bacteremia, PPM infection
treatment of MSSA bacteremia
treatment of staph aureus bacteremia
bacteremia

## 2025-05-02 LAB
CULTURE RESULTS: SIGNIFICANT CHANGE UP
SPECIMEN SOURCE: SIGNIFICANT CHANGE UP

## 2025-05-03 LAB
CULTURE RESULTS: SIGNIFICANT CHANGE UP
SPECIMEN SOURCE: SIGNIFICANT CHANGE UP

## 2025-05-04 LAB
CULTURE RESULTS: SIGNIFICANT CHANGE UP
SPECIMEN SOURCE: SIGNIFICANT CHANGE UP

## 2025-05-10 ENCOUNTER — NON-APPOINTMENT (OUTPATIENT)
Age: 65
End: 2025-05-10

## 2025-05-12 ENCOUNTER — APPOINTMENT (OUTPATIENT)
Dept: INFECTIOUS DISEASE | Facility: CLINIC | Age: 65
End: 2025-05-12

## 2025-05-12 PROCEDURE — 99213 OFFICE O/P EST LOW 20 MIN: CPT | Mod: 95

## 2025-05-13 ENCOUNTER — APPOINTMENT (OUTPATIENT)
Dept: HEART AND VASCULAR | Facility: CLINIC | Age: 65
End: 2025-05-13

## 2025-05-13 VITALS
DIASTOLIC BLOOD PRESSURE: 80 MMHG | SYSTOLIC BLOOD PRESSURE: 156 MMHG | WEIGHT: 187 LBS | BODY MASS INDEX: 29.35 KG/M2 | HEIGHT: 67 IN

## 2025-05-13 DIAGNOSIS — R60.9 EDEMA, UNSPECIFIED: ICD-10-CM

## 2025-05-13 DIAGNOSIS — Z95.0 PRESENCE OF CARDIAC PACEMAKER: ICD-10-CM

## 2025-05-13 PROCEDURE — 93279 PRGRMG DEV EVAL PM/LDLS PM: CPT

## 2025-05-13 PROCEDURE — 99214 OFFICE O/P EST MOD 30 MIN: CPT | Mod: 25

## 2025-05-15 ENCOUNTER — APPOINTMENT (OUTPATIENT)
Dept: HEART AND VASCULAR | Facility: CLINIC | Age: 65
End: 2025-05-15
Payer: COMMERCIAL

## 2025-05-15 VITALS
DIASTOLIC BLOOD PRESSURE: 90 MMHG | BODY MASS INDEX: 29.35 KG/M2 | OXYGEN SATURATION: 98 % | HEIGHT: 67 IN | HEART RATE: 79 BPM | WEIGHT: 187 LBS | SYSTOLIC BLOOD PRESSURE: 160 MMHG | TEMPERATURE: 97.2 F | RESPIRATION RATE: 16 BRPM

## 2025-05-15 DIAGNOSIS — I10 ESSENTIAL (PRIMARY) HYPERTENSION: ICD-10-CM

## 2025-05-15 DIAGNOSIS — G47.00 INSOMNIA, UNSPECIFIED: ICD-10-CM

## 2025-05-15 PROBLEM — E87.70 FLUID OVERLOAD: Status: ACTIVE | Noted: 2025-05-15

## 2025-05-15 PROBLEM — R78.81 STAPHYLOCOCCUS AUREUS BACTEREMIA: Status: ACTIVE | Noted: 2025-05-12

## 2025-05-15 PROBLEM — Z95.0 S/P PLACEMENT OF LEADLESS CARDIAC PACEMAKER: Status: ACTIVE | Noted: 2025-05-15

## 2025-05-15 PROCEDURE — 99214 OFFICE O/P EST MOD 30 MIN: CPT

## 2025-05-15 RX ORDER — LOSARTAN POTASSIUM 25 MG/1
25 TABLET, FILM COATED ORAL
Qty: 30 | Refills: 0 | Status: ACTIVE | COMMUNITY
Start: 2025-05-01

## 2025-05-15 RX ORDER — TRAZODONE HYDROCHLORIDE 50 MG/1
50 TABLET ORAL
Qty: 30 | Refills: 1 | Status: ACTIVE | COMMUNITY
Start: 2025-05-15 | End: 1900-01-01

## 2025-05-15 RX ORDER — SPIRONOLACTONE 25 MG/1
25 TABLET ORAL DAILY
Qty: 30 | Refills: 1 | Status: ACTIVE | COMMUNITY
Start: 2025-05-15 | End: 1900-01-01

## 2025-05-20 ENCOUNTER — APPOINTMENT (OUTPATIENT)
Dept: HEART AND VASCULAR | Facility: CLINIC | Age: 65
End: 2025-05-20

## 2025-05-20 ENCOUNTER — NON-APPOINTMENT (OUTPATIENT)
Age: 65
End: 2025-05-20

## 2025-05-20 ENCOUNTER — APPOINTMENT (OUTPATIENT)
Dept: INFECTIOUS DISEASE | Facility: CLINIC | Age: 65
End: 2025-05-20
Payer: COMMERCIAL

## 2025-05-20 ENCOUNTER — APPOINTMENT (OUTPATIENT)
Dept: HEART FAILURE | Facility: CLINIC | Age: 65
End: 2025-05-20
Payer: COMMERCIAL

## 2025-05-20 VITALS
HEART RATE: 73 BPM | SYSTOLIC BLOOD PRESSURE: 140 MMHG | HEIGHT: 67 IN | WEIGHT: 177 LBS | BODY MASS INDEX: 27.78 KG/M2 | DIASTOLIC BLOOD PRESSURE: 70 MMHG | OXYGEN SATURATION: 97 %

## 2025-05-20 DIAGNOSIS — I34.0 NONRHEUMATIC MITRAL (VALVE) INSUFFICIENCY: ICD-10-CM

## 2025-05-20 PROCEDURE — G2211 COMPLEX E/M VISIT ADD ON: CPT | Mod: NC,95

## 2025-05-20 PROCEDURE — 99214 OFFICE O/P EST MOD 30 MIN: CPT | Mod: 25

## 2025-05-20 PROCEDURE — 99213 OFFICE O/P EST LOW 20 MIN: CPT | Mod: 95

## 2025-05-20 PROCEDURE — 93000 ELECTROCARDIOGRAM COMPLETE: CPT

## 2025-05-20 RX ORDER — POTASSIUM CHLORIDE 750 MG/1
10 TABLET, FILM COATED, EXTENDED RELEASE ORAL
Qty: 30 | Refills: 0 | Status: ACTIVE | COMMUNITY
Start: 2025-05-13 | End: 1900-01-01

## 2025-05-22 ENCOUNTER — APPOINTMENT (OUTPATIENT)
Dept: HEART AND VASCULAR | Facility: CLINIC | Age: 65
End: 2025-05-22

## 2025-05-22 VITALS
BODY MASS INDEX: 26.21 KG/M2 | WEIGHT: 167 LBS | RESPIRATION RATE: 16 BRPM | SYSTOLIC BLOOD PRESSURE: 180 MMHG | OXYGEN SATURATION: 98 % | HEIGHT: 67 IN | DIASTOLIC BLOOD PRESSURE: 100 MMHG | HEART RATE: 80 BPM

## 2025-05-22 DIAGNOSIS — I34.89 OTHER NONRHEUMATIC MITRAL VALVE DISORDERS: ICD-10-CM

## 2025-05-22 DIAGNOSIS — E87.70 FLUID OVERLOAD, UNSPECIFIED: ICD-10-CM

## 2025-05-22 DIAGNOSIS — I44.2 ATRIOVENTRICULAR BLOCK, COMPLETE: ICD-10-CM

## 2025-05-22 PROCEDURE — 99213 OFFICE O/P EST LOW 20 MIN: CPT

## 2025-05-22 RX ORDER — FUROSEMIDE 40 MG/1
40 TABLET ORAL
Qty: 30 | Refills: 1 | Status: ACTIVE | COMMUNITY
Start: 2025-05-13 | End: 1900-01-01

## 2025-05-24 PROBLEM — I10 HYPERTENSION, UNCONTROLLED: Status: ACTIVE | Noted: 2025-05-24

## 2025-05-27 ENCOUNTER — APPOINTMENT (OUTPATIENT)
Dept: HEART AND VASCULAR | Facility: CLINIC | Age: 65
End: 2025-05-27

## 2025-05-29 ENCOUNTER — APPOINTMENT (OUTPATIENT)
Dept: HEART AND VASCULAR | Facility: CLINIC | Age: 65
End: 2025-05-29
Payer: COMMERCIAL

## 2025-05-29 VITALS
RESPIRATION RATE: 16 BRPM | BODY MASS INDEX: 26.21 KG/M2 | DIASTOLIC BLOOD PRESSURE: 97 MMHG | WEIGHT: 167 LBS | SYSTOLIC BLOOD PRESSURE: 140 MMHG | HEART RATE: 74 BPM | HEIGHT: 67 IN | OXYGEN SATURATION: 98 %

## 2025-05-29 DIAGNOSIS — I42.2 OTHER HYPERTROPHIC CARDIOMYOPATHY: ICD-10-CM

## 2025-05-29 DIAGNOSIS — D64.9 ANEMIA, UNSPECIFIED: ICD-10-CM

## 2025-05-29 DIAGNOSIS — I48.0 PAROXYSMAL ATRIAL FIBRILLATION: ICD-10-CM

## 2025-05-29 DIAGNOSIS — Z95.0 PRESENCE OF CARDIAC PACEMAKER: ICD-10-CM

## 2025-05-29 DIAGNOSIS — I10 ESSENTIAL (PRIMARY) HYPERTENSION: ICD-10-CM

## 2025-05-29 PROCEDURE — 99212 OFFICE O/P EST SF 10 MIN: CPT

## 2025-06-03 RX ORDER — FUROSEMIDE 20 MG/1
20 TABLET ORAL
Refills: 0 | Status: ACTIVE | COMMUNITY

## 2025-06-03 RX ORDER — AMLODIPINE BESYLATE 5 MG/1
5 TABLET ORAL
Qty: 30 | Refills: 3 | Status: ACTIVE | COMMUNITY
Start: 2025-06-03 | End: 1900-01-01

## 2025-06-05 ENCOUNTER — APPOINTMENT (OUTPATIENT)
Facility: CLINIC | Age: 65
End: 2025-06-05

## 2025-06-05 PROCEDURE — 99213 OFFICE O/P EST LOW 20 MIN: CPT | Mod: 95

## 2025-06-05 PROCEDURE — G2211 COMPLEX E/M VISIT ADD ON: CPT | Mod: NC,95

## 2025-06-12 ENCOUNTER — APPOINTMENT (OUTPATIENT)
Dept: HEART AND VASCULAR | Facility: CLINIC | Age: 65
End: 2025-06-12
Payer: COMMERCIAL

## 2025-06-12 ENCOUNTER — NON-APPOINTMENT (OUTPATIENT)
Age: 65
End: 2025-06-12

## 2025-06-12 VITALS
RESPIRATION RATE: 16 BRPM | WEIGHT: 141 LBS | OXYGEN SATURATION: 97 % | HEIGHT: 67 IN | SYSTOLIC BLOOD PRESSURE: 130 MMHG | BODY MASS INDEX: 22.13 KG/M2 | DIASTOLIC BLOOD PRESSURE: 92 MMHG | HEART RATE: 80 BPM

## 2025-06-12 PROCEDURE — 93000 ELECTROCARDIOGRAM COMPLETE: CPT

## 2025-06-12 PROCEDURE — 99213 OFFICE O/P EST LOW 20 MIN: CPT | Mod: 25

## 2025-06-20 ENCOUNTER — LABORATORY RESULT (OUTPATIENT)
Age: 65
End: 2025-06-20

## 2025-06-23 ENCOUNTER — APPOINTMENT (OUTPATIENT)
Dept: HEART FAILURE | Facility: CLINIC | Age: 65
End: 2025-06-23

## 2025-06-24 ENCOUNTER — NON-APPOINTMENT (OUTPATIENT)
Age: 65
End: 2025-06-24

## 2025-06-26 ENCOUNTER — APPOINTMENT (OUTPATIENT)
Dept: INFECTIOUS DISEASE | Facility: CLINIC | Age: 65
End: 2025-06-26
Payer: COMMERCIAL

## 2025-06-26 PROCEDURE — G2211 COMPLEX E/M VISIT ADD ON: CPT | Mod: NC,95

## 2025-06-26 PROCEDURE — 99213 OFFICE O/P EST LOW 20 MIN: CPT | Mod: 95

## 2025-06-26 RX ORDER — AMOXICILLIN 500 MG/1
500 TABLET, FILM COATED ORAL
Qty: 4 | Refills: 3 | Status: ACTIVE | COMMUNITY
Start: 2025-06-26 | End: 1900-01-01

## 2025-07-01 ENCOUNTER — NON-APPOINTMENT (OUTPATIENT)
Age: 65
End: 2025-07-01

## 2025-07-03 ENCOUNTER — APPOINTMENT (OUTPATIENT)
Dept: HEART AND VASCULAR | Facility: CLINIC | Age: 65
End: 2025-07-03
Payer: COMMERCIAL

## 2025-07-03 VITALS
OXYGEN SATURATION: 98 % | WEIGHT: 143 LBS | RESPIRATION RATE: 16 BRPM | HEIGHT: 67 IN | HEART RATE: 82 BPM | BODY MASS INDEX: 22.44 KG/M2 | SYSTOLIC BLOOD PRESSURE: 122 MMHG | DIASTOLIC BLOOD PRESSURE: 76 MMHG

## 2025-07-03 PROBLEM — Q24.8 HYPERTROPHIC OBSTRUCTIVE CARDIOMYOPATHY, CONGENITAL: Status: ACTIVE | Noted: 2025-07-03

## 2025-07-03 PROCEDURE — 99213 OFFICE O/P EST LOW 20 MIN: CPT

## 2025-07-03 RX ORDER — OMEGA-3/DHA/EPA/FISH OIL 300-1000MG
CAPSULE ORAL
Refills: 0 | Status: ACTIVE | COMMUNITY

## 2025-07-03 RX ORDER — TADALAFIL 5 MG/1
5 TABLET ORAL DAILY
Refills: 0 | Status: ACTIVE | COMMUNITY

## 2025-07-08 ENCOUNTER — APPOINTMENT (OUTPATIENT)
Dept: HEART FAILURE | Facility: CLINIC | Age: 65
End: 2025-07-08
Payer: COMMERCIAL

## 2025-07-08 VITALS
HEIGHT: 67 IN | HEART RATE: 73 BPM | SYSTOLIC BLOOD PRESSURE: 136 MMHG | BODY MASS INDEX: 22.29 KG/M2 | DIASTOLIC BLOOD PRESSURE: 70 MMHG | OXYGEN SATURATION: 99 % | WEIGHT: 142 LBS

## 2025-07-08 PROCEDURE — 93000 ELECTROCARDIOGRAM COMPLETE: CPT

## 2025-07-08 PROCEDURE — 99214 OFFICE O/P EST MOD 30 MIN: CPT | Mod: 25

## 2025-08-13 ENCOUNTER — APPOINTMENT (OUTPATIENT)
Dept: HEART AND VASCULAR | Facility: CLINIC | Age: 65
End: 2025-08-13

## 2025-08-13 ENCOUNTER — APPOINTMENT (OUTPATIENT)
Dept: HEART AND VASCULAR | Facility: CLINIC | Age: 65
End: 2025-08-13
Payer: COMMERCIAL

## 2025-08-13 VITALS
DIASTOLIC BLOOD PRESSURE: 80 MMHG | OXYGEN SATURATION: 98 % | WEIGHT: 148 LBS | BODY MASS INDEX: 23.18 KG/M2 | HEART RATE: 82 BPM | SYSTOLIC BLOOD PRESSURE: 118 MMHG

## 2025-08-13 DIAGNOSIS — Z95.0 PRESENCE OF CARDIAC PACEMAKER: ICD-10-CM

## 2025-08-13 DIAGNOSIS — I42.2 OTHER HYPERTROPHIC CARDIOMYOPATHY: ICD-10-CM

## 2025-08-13 DIAGNOSIS — I44.2 ATRIOVENTRICULAR BLOCK, COMPLETE: ICD-10-CM

## 2025-08-13 PROCEDURE — 93279 PRGRMG DEV EVAL PM/LDLS PM: CPT

## 2025-08-13 PROCEDURE — 99214 OFFICE O/P EST MOD 30 MIN: CPT

## 2025-08-14 ENCOUNTER — APPOINTMENT (OUTPATIENT)
Dept: HEART AND VASCULAR | Facility: CLINIC | Age: 65
End: 2025-08-14
Payer: COMMERCIAL

## 2025-08-14 VITALS
WEIGHT: 148 LBS | DIASTOLIC BLOOD PRESSURE: 76 MMHG | BODY MASS INDEX: 23.23 KG/M2 | HEART RATE: 73 BPM | HEIGHT: 67 IN | OXYGEN SATURATION: 98 % | SYSTOLIC BLOOD PRESSURE: 110 MMHG

## 2025-08-14 PROCEDURE — 93325 DOPPLER ECHO COLOR FLOW MAPG: CPT

## 2025-08-14 PROCEDURE — 93351 STRESS TTE COMPLETE: CPT

## 2025-08-14 PROCEDURE — 93320 DOPPLER ECHO COMPLETE: CPT
